# Patient Record
Sex: MALE | Race: WHITE | Employment: OTHER | URBAN - METROPOLITAN AREA
[De-identification: names, ages, dates, MRNs, and addresses within clinical notes are randomized per-mention and may not be internally consistent; named-entity substitution may affect disease eponyms.]

---

## 2019-04-05 ENCOUNTER — APPOINTMENT (OUTPATIENT)
Dept: CT IMAGING | Age: 69
DRG: 247 | End: 2019-04-05
Payer: MEDICARE

## 2019-04-05 ENCOUNTER — APPOINTMENT (OUTPATIENT)
Dept: GENERAL RADIOLOGY | Age: 69
DRG: 247 | End: 2019-04-05
Payer: MEDICARE

## 2019-04-05 ENCOUNTER — HOSPITAL ENCOUNTER (INPATIENT)
Age: 69
LOS: 1 days | Discharge: HOME | DRG: 247 | End: 2019-04-09
Attending: EMERGENCY MEDICINE | Admitting: INTERNAL MEDICINE
Payer: MEDICARE

## 2019-04-05 DIAGNOSIS — R07.9 CHEST PAIN, UNSPECIFIED TYPE: Primary | ICD-10-CM

## 2019-04-05 PROBLEM — R74.8 ELEVATED LIPASE: Status: ACTIVE | Noted: 2019-04-05

## 2019-04-05 PROBLEM — E66.9 OBESITY (BMI 30-39.9): Status: ACTIVE | Noted: 2019-04-05

## 2019-04-05 PROBLEM — R51.9 HEADACHE: Status: ACTIVE | Noted: 2019-04-05

## 2019-04-05 PROBLEM — I71.40 AAA (ABDOMINAL AORTIC ANEURYSM): Status: ACTIVE | Noted: 2019-04-05

## 2019-04-05 PROBLEM — I10 ACCELERATED HYPERTENSION: Status: ACTIVE | Noted: 2019-04-05

## 2019-04-05 PROBLEM — I45.10 INCOMPLETE RIGHT BUNDLE BRANCH BLOCK: Status: ACTIVE | Noted: 2019-04-05

## 2019-04-05 PROBLEM — E11.65 UNCONTROLLED TYPE 2 DIABETES MELLITUS WITH HYPERGLYCEMIA (HCC): Status: ACTIVE | Noted: 2019-04-05

## 2019-04-05 LAB
ALBUMIN SERPL-MCNC: 4.3 G/DL (ref 3.5–5.2)
ALP BLD-CCNC: 67 U/L (ref 40–129)
ALT SERPL-CCNC: 24 U/L (ref 0–40)
AMYLASE: 69 U/L (ref 20–100)
ANION GAP SERPL CALCULATED.3IONS-SCNC: 12 MMOL/L (ref 7–16)
AST SERPL-CCNC: 42 U/L (ref 0–39)
BASOPHILS ABSOLUTE: 0.02 E9/L (ref 0–0.2)
BASOPHILS RELATIVE PERCENT: 0.3 % (ref 0–2)
BILIRUB SERPL-MCNC: 0.3 MG/DL (ref 0–1.2)
BUN BLDV-MCNC: 18 MG/DL (ref 8–23)
CALCIUM SERPL-MCNC: 9.4 MG/DL (ref 8.6–10.2)
CHLORIDE BLD-SCNC: 100 MMOL/L (ref 98–107)
CO2: 26 MMOL/L (ref 22–29)
CREAT SERPL-MCNC: 1.1 MG/DL (ref 0.7–1.2)
EOSINOPHILS ABSOLUTE: 0.1 E9/L (ref 0.05–0.5)
EOSINOPHILS RELATIVE PERCENT: 1.4 % (ref 0–6)
GFR AFRICAN AMERICAN: >60
GFR AFRICAN AMERICAN: >60
GFR NON-AFRICAN AMERICAN: >60 ML/MIN/1.73
GFR NON-AFRICAN AMERICAN: >60 ML/MIN/1.73
GLUCOSE BLD-MCNC: 252 MG/DL (ref 74–99)
GLUCOSE BLD-MCNC: 261 MG/DL (ref 74–99)
HBA1C MFR BLD: 7.4 % (ref 4–5.6)
HCT VFR BLD CALC: 46.6 % (ref 37–54)
HEMOGLOBIN: 15.6 G/DL (ref 12.5–16.5)
IMMATURE GRANULOCYTES #: 0.02 E9/L
IMMATURE GRANULOCYTES %: 0.3 % (ref 0–5)
INR BLD: 1.1
LIPASE: 112 U/L (ref 13–60)
LYMPHOCYTES ABSOLUTE: 1.74 E9/L (ref 1.5–4)
LYMPHOCYTES RELATIVE PERCENT: 25 % (ref 20–42)
MCH RBC QN AUTO: 28.1 PG (ref 26–35)
MCHC RBC AUTO-ENTMCNC: 33.5 % (ref 32–34.5)
MCV RBC AUTO: 83.8 FL (ref 80–99.9)
METER GLUCOSE: 127 MG/DL (ref 74–99)
MONOCYTES ABSOLUTE: 0.51 E9/L (ref 0.1–0.95)
MONOCYTES RELATIVE PERCENT: 7.3 % (ref 2–12)
NEUTROPHILS ABSOLUTE: 4.58 E9/L (ref 1.8–7.3)
NEUTROPHILS RELATIVE PERCENT: 65.7 % (ref 43–80)
PDW BLD-RTO: 13.2 FL (ref 11.5–15)
PERFORMED ON: ABNORMAL
PLATELET # BLD: 298 E9/L (ref 130–450)
PMV BLD AUTO: 10.2 FL (ref 7–12)
POC CHLORIDE: 108 MMOL/L (ref 100–108)
POC CREATININE: 1.1 MG/DL (ref 0.7–1.2)
POC POTASSIUM: 3.5 MMOL/L (ref 3.5–5)
POC SODIUM: 140 MMOL/L (ref 132–146)
POTASSIUM REFLEX MAGNESIUM: 4.9 MMOL/L (ref 3.5–5)
PROTHROMBIN TIME: 12.2 SEC (ref 9.3–12.4)
RBC # BLD: 5.56 E12/L (ref 3.8–5.8)
SODIUM BLD-SCNC: 138 MMOL/L (ref 132–146)
TOTAL PROTEIN: 8.1 G/DL (ref 6.4–8.3)
TROPONIN: <0.01 NG/ML (ref 0–0.03)
TROPONIN: <0.01 NG/ML (ref 0–0.03)
TSH SERPL DL<=0.05 MIU/L-ACNC: 6.67 UIU/ML (ref 0.27–4.2)
WBC # BLD: 7 E9/L (ref 4.5–11.5)

## 2019-04-05 PROCEDURE — 36415 COLL VENOUS BLD VENIPUNCTURE: CPT

## 2019-04-05 PROCEDURE — 80053 COMPREHEN METABOLIC PANEL: CPT

## 2019-04-05 PROCEDURE — 84132 ASSAY OF SERUM POTASSIUM: CPT

## 2019-04-05 PROCEDURE — 84443 ASSAY THYROID STIM HORMONE: CPT

## 2019-04-05 PROCEDURE — 6370000000 HC RX 637 (ALT 250 FOR IP): Performed by: EMERGENCY MEDICINE

## 2019-04-05 PROCEDURE — 82962 GLUCOSE BLOOD TEST: CPT

## 2019-04-05 PROCEDURE — G0378 HOSPITAL OBSERVATION PER HR: HCPCS

## 2019-04-05 PROCEDURE — 82947 ASSAY GLUCOSE BLOOD QUANT: CPT

## 2019-04-05 PROCEDURE — 71045 X-RAY EXAM CHEST 1 VIEW: CPT

## 2019-04-05 PROCEDURE — 83690 ASSAY OF LIPASE: CPT

## 2019-04-05 PROCEDURE — 84484 ASSAY OF TROPONIN QUANT: CPT

## 2019-04-05 PROCEDURE — 71275 CT ANGIOGRAPHY CHEST: CPT

## 2019-04-05 PROCEDURE — 84295 ASSAY OF SERUM SODIUM: CPT

## 2019-04-05 PROCEDURE — 85610 PROTHROMBIN TIME: CPT

## 2019-04-05 PROCEDURE — 82435 ASSAY OF BLOOD CHLORIDE: CPT

## 2019-04-05 PROCEDURE — 6360000004 HC RX CONTRAST MEDICATION: Performed by: RADIOLOGY

## 2019-04-05 PROCEDURE — 2580000003 HC RX 258: Performed by: RADIOLOGY

## 2019-04-05 PROCEDURE — 82565 ASSAY OF CREATININE: CPT

## 2019-04-05 PROCEDURE — 99285 EMERGENCY DEPT VISIT HI MDM: CPT

## 2019-04-05 PROCEDURE — 83036 HEMOGLOBIN GLYCOSYLATED A1C: CPT

## 2019-04-05 PROCEDURE — 82150 ASSAY OF AMYLASE: CPT

## 2019-04-05 PROCEDURE — 85025 COMPLETE CBC W/AUTO DIFF WBC: CPT

## 2019-04-05 RX ORDER — KETOTIFEN FUMARATE 0.35 MG/ML
1 SOLUTION/ DROPS OPHTHALMIC 2 TIMES DAILY
Status: DISCONTINUED | OUTPATIENT
Start: 2019-04-05 | End: 2019-04-09 | Stop reason: HOSPADM

## 2019-04-05 RX ORDER — LEVOTHYROXINE SODIUM 0.03 MG/1
25 TABLET ORAL DAILY
COMMUNITY

## 2019-04-05 RX ORDER — ASPIRIN 81 MG/1
81 TABLET, CHEWABLE ORAL DAILY
Status: DISCONTINUED | OUTPATIENT
Start: 2019-04-06 | End: 2019-04-08

## 2019-04-05 RX ORDER — ASPIRIN 325 MG
325 TABLET ORAL ONCE
Status: COMPLETED | OUTPATIENT
Start: 2019-04-05 | End: 2019-04-05

## 2019-04-05 RX ORDER — SODIUM CHLORIDE 0.9 % (FLUSH) 0.9 %
10 SYRINGE (ML) INJECTION PRN
Status: DISCONTINUED | OUTPATIENT
Start: 2019-04-05 | End: 2019-04-09 | Stop reason: HOSPADM

## 2019-04-05 RX ORDER — NICOTINE POLACRILEX 4 MG
15 LOZENGE BUCCAL PRN
Status: DISCONTINUED | OUTPATIENT
Start: 2019-04-05 | End: 2019-04-09 | Stop reason: HOSPADM

## 2019-04-05 RX ORDER — ATORVASTATIN CALCIUM 40 MG/1
40 TABLET, FILM COATED ORAL NIGHTLY
Status: DISCONTINUED | OUTPATIENT
Start: 2019-04-05 | End: 2019-04-06

## 2019-04-05 RX ORDER — HYDROCHLOROTHIAZIDE 12.5 MG/1
12.5 TABLET ORAL DAILY
Status: DISCONTINUED | OUTPATIENT
Start: 2019-04-05 | End: 2019-04-07

## 2019-04-05 RX ORDER — GLIPIZIDE 5 MG/1
10 TABLET ORAL DAILY
Status: DISCONTINUED | OUTPATIENT
Start: 2019-04-06 | End: 2019-04-09 | Stop reason: HOSPADM

## 2019-04-05 RX ORDER — LISINOPRIL 20 MG/1
20 TABLET ORAL DAILY
Status: DISCONTINUED | OUTPATIENT
Start: 2019-04-06 | End: 2019-04-09

## 2019-04-05 RX ORDER — LEVOTHYROXINE SODIUM 0.03 MG/1
25 TABLET ORAL DAILY
Status: DISCONTINUED | OUTPATIENT
Start: 2019-04-06 | End: 2019-04-09 | Stop reason: HOSPADM

## 2019-04-05 RX ORDER — SODIUM CHLORIDE 0.9 % (FLUSH) 0.9 %
10 SYRINGE (ML) INJECTION EVERY 12 HOURS SCHEDULED
Status: DISCONTINUED | OUTPATIENT
Start: 2019-04-05 | End: 2019-04-09 | Stop reason: HOSPADM

## 2019-04-05 RX ORDER — EZETIMIBE 10 MG/1
10 TABLET ORAL DAILY
Status: ON HOLD | COMMUNITY
End: 2021-09-28

## 2019-04-05 RX ORDER — DEXTROSE MONOHYDRATE 50 MG/ML
100 INJECTION, SOLUTION INTRAVENOUS PRN
Status: DISCONTINUED | OUTPATIENT
Start: 2019-04-05 | End: 2019-04-09 | Stop reason: HOSPADM

## 2019-04-05 RX ORDER — SODIUM CHLORIDE 0.9 % (FLUSH) 0.9 %
10 SYRINGE (ML) INJECTION ONCE
Status: COMPLETED | OUTPATIENT
Start: 2019-04-05 | End: 2019-04-05

## 2019-04-05 RX ORDER — NITROGLYCERIN 0.4 MG/1
0.4 TABLET SUBLINGUAL EVERY 5 MIN PRN
Status: DISCONTINUED | OUTPATIENT
Start: 2019-04-05 | End: 2019-04-09 | Stop reason: HOSPADM

## 2019-04-05 RX ORDER — KETOTIFEN FUMARATE 0.35 MG/ML
1 SOLUTION/ DROPS OPHTHALMIC 2 TIMES DAILY
Status: ON HOLD | COMMUNITY
End: 2021-09-28

## 2019-04-05 RX ORDER — ONDANSETRON 2 MG/ML
4 INJECTION INTRAMUSCULAR; INTRAVENOUS EVERY 6 HOURS PRN
Status: DISCONTINUED | OUTPATIENT
Start: 2019-04-05 | End: 2019-04-09 | Stop reason: HOSPADM

## 2019-04-05 RX ORDER — DEXTROSE MONOHYDRATE 25 G/50ML
12.5 INJECTION, SOLUTION INTRAVENOUS PRN
Status: DISCONTINUED | OUTPATIENT
Start: 2019-04-05 | End: 2019-04-09 | Stop reason: HOSPADM

## 2019-04-05 RX ORDER — ASPIRIN 81 MG/1
162 TABLET, CHEWABLE ORAL ONCE
Status: DISCONTINUED | OUTPATIENT
Start: 2019-04-05 | End: 2019-04-05

## 2019-04-05 RX ORDER — TAMSULOSIN HYDROCHLORIDE 0.4 MG/1
0.8 CAPSULE ORAL DAILY
Status: ON HOLD | COMMUNITY
End: 2021-12-02 | Stop reason: HOSPADM

## 2019-04-05 RX ORDER — TAMSULOSIN HYDROCHLORIDE 0.4 MG/1
0.8 CAPSULE ORAL DAILY
Status: DISCONTINUED | OUTPATIENT
Start: 2019-04-06 | End: 2019-04-09 | Stop reason: HOSPADM

## 2019-04-05 RX ADMIN — NITROGLYCERIN 0.4 MG: 0.4 TABLET, ORALLY DISINTEGRATING SUBLINGUAL at 17:07

## 2019-04-05 RX ADMIN — IOPAMIDOL 90 ML: 755 INJECTION, SOLUTION INTRAVENOUS at 16:55

## 2019-04-05 RX ADMIN — Medication 10 ML: at 16:55

## 2019-04-05 RX ADMIN — ASPIRIN 325 MG: 325 TABLET, COATED ORAL at 17:23

## 2019-04-05 ASSESSMENT — PAIN DESCRIPTION - ORIENTATION: ORIENTATION: RIGHT

## 2019-04-05 ASSESSMENT — PAIN SCALES - GENERAL
PAINLEVEL_OUTOF10: 0
PAINLEVEL_OUTOF10: 6
PAINLEVEL_OUTOF10: 0

## 2019-04-05 ASSESSMENT — ENCOUNTER SYMPTOMS
COUGH: 0
SINUS PRESSURE: 0
EYE REDNESS: 0
SHORTNESS OF BREATH: 1
NAUSEA: 0
EYE DISCHARGE: 0
DIARRHEA: 0
ABDOMINAL PAIN: 0
EYE PAIN: 0
SORE THROAT: 0
WHEEZING: 0
VOMITING: 0
BACK PAIN: 0

## 2019-04-05 ASSESSMENT — PAIN DESCRIPTION - PROGRESSION: CLINICAL_PROGRESSION: RESOLVED

## 2019-04-05 ASSESSMENT — PAIN DESCRIPTION - DESCRIPTORS: DESCRIPTORS: ACHING

## 2019-04-05 ASSESSMENT — PAIN DESCRIPTION - FREQUENCY: FREQUENCY: CONTINUOUS

## 2019-04-05 ASSESSMENT — PAIN DESCRIPTION - LOCATION: LOCATION: CHEST

## 2019-04-05 ASSESSMENT — PAIN DESCRIPTION - PAIN TYPE: TYPE: ACUTE PAIN

## 2019-04-05 NOTE — ED NOTES
Yolanda Willson RN confirmed retrieval of SBAR, monitor applied, CMR called to verify placement, placed in transport.       Héctor Lucas RN  04/05/19 [id number]

## 2019-04-05 NOTE — ED PROVIDER NOTES
28-year-old male presenting to the emergency department by EMS for primary complaint of chest pain. Patient states pain began approximately 30 minutes prior to arrival. Pain is described as a tight in nature, with radiation into his right arm. This pain was sudden in onset and occurred while he was standing in line at the bank, he states pain is worse with ambulation, and better with rest. He denies any recent trauma. He also notes associated shortness of breath, as well as mild diaphoresis. He takes an 81 mg aspirin every night. He denies any cardiac history, but states that recently he has been experiencing some left upper chest discomfort. He received a cardiac stress test and echocardiogram at the [facility] clinic recently which did not reveal any acute abnormalities. The history is provided by the patient. Review of Systems   Constitutional: Positive for diaphoresis. Negative for chills and fever. HENT: Negative for ear pain, sinus pressure and sore throat. Eyes: Negative for pain, discharge and redness. Respiratory: Positive for shortness of breath. Negative for cough and wheezing. Cardiovascular: Positive for chest pain. Gastrointestinal: Negative for abdominal pain, diarrhea, nausea and vomiting. Genitourinary: Negative for dysuria and frequency. Musculoskeletal: Negative for arthralgias and back pain. Skin: Negative for rash and wound. Neurological: Negative for weakness and headaches. Hematological: Negative for adenopathy. All other systems reviewed and are negative. Physical Exam   Constitutional: He is oriented to person, place, and time. He appears well-developed and well-nourished. Patient is laying supine, appears anxious   HENT:   Head: Normocephalic and atraumatic. Right Ear: External ear normal.   Left Ear: External ear normal.   Mouth/Throat: Oropharynx is clear and moist.   Eyes: Pupils are equal, round, and reactive to light. Neck: Normal range of motion. Neck supple. Cardiovascular: Normal rate, regular rhythm and normal heart sounds. No murmur heard. Pulmonary/Chest: Effort normal and breath sounds normal. No respiratory distress. He has no wheezes. He has no rales. Abdominal: Soft. Bowel sounds are normal. There is no tenderness. There is no rebound and no guarding. Musculoskeletal: He exhibits no edema. Neurological: He is alert and oriented to person, place, and time. No cranial nerve deficit. Coordination normal.   Skin: Skin is warm and dry. Mildly diaphoretic   Nursing note and vitals reviewed. Procedures    MDM  Number of Diagnoses or Management Options  Chest pain, unspecified type:   Diagnosis management comments: 61-year-old male presenting with sudden onset chest pain, worse with exertion, with associated shortness of breath, chest tightness with radiation to the right arm. EKG shows no acute ST elevations or depressions. Vital signs concerning for hypertension. Patient given sublingual nitroglycerin, chewable aspirin. Cardiac workup negative at this time for any acute abnormalities. Chest pain resolved significantly with subungual nitroglycerin. Serial evaluations notable for patient chest pain improvement. He was admitted for further evaluation and cardiac workup. ED Course as of Apr 05 1821 Fri Apr 05, 2019   1730 Reassessed patient at bedside, he states his chest pain is improved significantly shortly after receiving sublingual a glycerin    [KS]      ED Course User Index  [KS] Dinora Weaver DO       EKG: This EKG is signed and interpreted by me.     Rate: 86  Rhythm: Sinus  Interpretation: No acute ST elevations or depressions, adequate R wave progression left axis deviation, incomplete right bundle branch block  Comparison: No previous EKG available for comparison    --------------------------------------------- PAST HISTORY ---------------------------------------------  Past Medical History:  has a past medical history of Diabetes mellitus ([facility] [address] Hypercholesteremia, Hypertension, and Squamous cell carcinoma. Past Surgical History:  has a past surgical history that includes shoulder surgery; Carpal tunnel release; Tonsillectomy; and Hemorrhoid surgery. Social History:  reports that he has quit smoking. He quit smokeless tobacco use about 30 years ago. He reports that he does not drink alcohol or use drugs. Family History: family history is not on file. The patients home medications have been reviewed. Allergies: Patient has no known allergies.     -------------------------------------------------- RESULTS -------------------------------------------------    LABS:  Results for orders placed or performed during the hospital encounter of 04/05/19   CBC Auto Differential   Result Value Ref Range    WBC 7.0 4.5 - 11.5 E9/L    RBC 5.56 3.80 - 5.80 E12/L    Hemoglobin 15.6 12.5 - 16.5 g/dL    Hematocrit 46.6 37.0 - 54.0 %    MCV 83.8 80.0 - 99.9 fL    MCH 28.1 26.0 - 35.0 pg    MCHC 33.5 32.0 - 34.5 %    RDW 13.2 11.5 - 15.0 fL    Platelets 214 834 - 396 E9/L    MPV 10.2 7.0 - 12.0 fL    Neutrophils % 65.7 43.0 - 80.0 %    Immature Granulocytes % 0.3 0.0 - 5.0 %    Lymphocytes % 25.0 20.0 - 42.0 %    Monocytes % 7.3 2.0 - 12.0 %    Eosinophils % 1.4 0.0 - 6.0 %    Basophils % 0.3 0.0 - 2.0 %    Neutrophils # 4.58 1.80 - 7.30 E9/L    Immature Granulocytes # 0.02 E9/L    Lymphocytes # 1.74 1.50 - 4.00 E9/L    Monocytes # 0.51 0.10 - 0.95 E9/L    Eosinophils # 0.10 0.05 - 0.50 E9/L    Basophils # 0.02 0.00 - 0.20 E9/L   Comprehensive Metabolic Panel w/ Reflex to MG   Result Value Ref Range    Sodium 138 132 - 146 mmol/L    Potassium reflex Magnesium 4.9 3.5 - 5.0 mmol/L    Chloride 100 98 - 107 mmol/L    CO2 26 22 - 29 mmol/L    Anion Gap 12 7 - 16 mmol/L    Glucose 252 (H) 74 - 99 mg/dL    BUN 18 8 - 23 mg/dL    CREATININE 1.1 0.7 - 1.2 mg/dL    GFR Non-African American >60 >=60 mL/min/1.73    GFR African American >60 Calcium 9.4 8.6 - 10.2 mg/dL    Total Protein 8.1 6.4 - 8.3 g/dL    Alb 4.3 3.5 - 5.2 g/dL    Total Bilirubin 0.3 0.0 - 1.2 mg/dL    Alkaline Phosphatase 67 40 - 129 U/L    ALT 24 0 - 40 U/L    AST 42 (H) 0 - 39 U/L   Troponin   Result Value Ref Range    Troponin <0.01 0.00 - 0.03 ng/mL   Lipase   Result Value Ref Range    Lipase 112 (H) 13 - 60 U/L   Protime-INR   Result Value Ref Range    Protime 12.2 9.3 - 12.4 sec    INR 1.1    POCT Venous   Result Value Ref Range    POC Sodium 140 132 - 146 mmol/L    POC Potassium 3.5 3.5 - 5.0 mmol/L    POC Chloride 108 100 - 108 mmol/L    POC Glucose 261 (H) 74 - 99 mg/dl    POC Creatinine 1.1 0.7 - 1.2 mg/dL    GFR Non-African American >60 >=60 mL/min/1.73    GFR  >60     Performed on SEE BELOW        RADIOLOGY:  Xr Chest Portable    Result Date: 2019  Patient MRN: [id number] : 1950 Age:  81 years Gender: Male Order Date: 2019 4:45 PM. Study made available for interpretation at approximately 1650 hours Exam: XR CHEST PORTABLE Number of Views: 1 Indication:   Chest pain and shortness of breath Comparison: None Findings: There is a normal cardiomediastinal silhouette with thoracic aortic vascular calcifications with bibasilar airspace disease, nonspecific finding. No pneumothorax. 1. Vascular calcifications thoracic aorta. 2. Nonspecific bibasilar airspace disease, findings can be seen infiltrate/pneumonia and/or atelectasis. Cta Chest W Contrast    Result Date: 2019  Patient MRN:  [id number] : 1950 Age: 81 years Gender: Male Order Date:  2019 4:45 PM EXAM: CTA CHEST W CONTRAST number of images 938 including MIP coronal and sagittal reconstruction images, and 3-D reconstruction images of the aorta. Contrast. Isovue-370, 90 mL intravenously Technique: Low-dose CT  acquisition technique included one of following options; 1 . Automated exposure control, 2. Adjustment of MA and or KV according to patient's size or 3.  Use of iterative reconstruction. INDICATION:  R/O dissecton  COMPARISON: None FINDINGS: The heart and the great vessels are unremarkable. The trachea and major bronchi are patent. There is coronary artery calcifications. There are no filling defects in the main pulmonary artery and the central branches to suggest  pulmonary embolism. Lungs are free of acute infiltrate or pleural effusion. The liver is of normal architecture. Bilateral renal cysts are identified with the largest one in the left kidney measuring 3.4 cm. Nonobstructing left kidney stones are also noted. . Degenerative changes are identified in the thoracic spine. CTA. The thoracic aorta appears normal with ascending thoracic aorta measuring 3 x 3.2 cm and the descending thoracic aorta measuring 2.2 cm without dissection or aneurysm. The origin of the great vessels appear normal.     There is no thoracic aortic aneurysm, aortic dissection or central pulmonary embolism. There is coronary artery calcifications.           ------------------------- NURSING NOTES AND VITALS REVIEWED ---------------------------  Date / Time Roomed:  4/5/2019  4:33 PM  ED Bed Assignment:  23/23    The nursing notes within the ED encounter and vital signs as below have been reviewed.      Patient Vitals for the past 24 hrs:   BP Temp Pulse Resp SpO2 Height Weight   04/05/19 1714 (!) 172/84 -- 77 19 97 % -- --   04/05/19 1635 (!) 240/90 98.3 °F (36.8 °C) 92 16 97 % 5' 9\" (1.753 m) 205 lb (93 kg)   04/05/19 1628 -- -- 88 -- 97 % -- --       Oxygen Saturation Interpretation: Normal    ------------------------------------------ PROGRESS NOTES ------------------------------------------  ED Course as of Apr 05 1821 Fri Apr 05, 2019   1730 Reassessed patient at bedside, he states his chest pain is improved significantly shortly after receiving sublingual a glycerin    [KS]      ED Course User Index  [KS] Dante Clarity, DO           Counseling:  I have spoken with the patient and discussed todays results, in addition to providing specific details for the plan of care and counseling regarding the diagnosis and prognosis. Their questions are answered at this time and they are agreeable with the plan of admission.    --------------------------------- ADDITIONAL PROVIDER NOTES ---------------------------------  Consultations:  Time: 1818. Spoke with Dr. Jorge Mena. Discussed case. They will admit the patient. This patient's ED course included: a personal history and physicial examination    This patient has remained hemodynamically stable during their ED course. Diagnosis:  1. Chest pain, unspecified type        Disposition:  Patient's disposition: Admit to telemetry  Patient's condition is stable.          Nikki Spring DO  Resident  04/05/19 5816

## 2019-04-05 NOTE — ED NOTES
ED SBAR faxed, called 6th floor to request telemetry monitor.       Anthony Medeiros RN  04/05/19 0926

## 2019-04-05 NOTE — ED NOTES
Name: Ingrid Flower  : 1950  MRN: [id number]    Date: 2019    Benefits of immediately proceeding with Radiology exam outweigh the risks and therefore the following is being waived:      [] Pregnancy test    [] Protocol for Iodine allergy    [] MRI questionnaire    [x] BUN/Creatinine        MD Maged Galvez MD  19 [id number]

## 2019-04-06 ENCOUNTER — APPOINTMENT (OUTPATIENT)
Dept: ULTRASOUND IMAGING | Age: 69
DRG: 247 | End: 2019-04-06
Payer: MEDICARE

## 2019-04-06 PROBLEM — I71.40 AAA (ABDOMINAL AORTIC ANEURYSM) (HCC): Status: RESOLVED | Noted: 2019-04-05 | Resolved: 2019-04-06

## 2019-04-06 PROBLEM — I25.9 CHEST PAIN DUE TO MYOCARDIAL ISCHEMIA: Status: ACTIVE | Noted: 2019-04-06

## 2019-04-06 PROBLEM — E66.811 CLASS 1 OBESITY DUE TO EXCESS CALORIES IN ADULT: Status: ACTIVE | Noted: 2019-04-06

## 2019-04-06 PROBLEM — E66.09 CLASS 1 OBESITY DUE TO EXCESS CALORIES IN ADULT: Status: ACTIVE | Noted: 2019-04-06

## 2019-04-06 LAB
ANION GAP SERPL CALCULATED.3IONS-SCNC: 13 MMOL/L (ref 7–16)
BUN BLDV-MCNC: 15 MG/DL (ref 8–23)
CALCIUM SERPL-MCNC: 8.8 MG/DL (ref 8.6–10.2)
CHLORIDE BLD-SCNC: 107 MMOL/L (ref 98–107)
CHOLESTEROL, TOTAL: 177 MG/DL (ref 0–199)
CO2: 23 MMOL/L (ref 22–29)
CREAT SERPL-MCNC: 1.1 MG/DL (ref 0.7–1.2)
GFR AFRICAN AMERICAN: >60
GFR NON-AFRICAN AMERICAN: >60 ML/MIN/1.73
GLUCOSE BLD-MCNC: 126 MG/DL (ref 74–99)
HCT VFR BLD CALC: 41.8 % (ref 37–54)
HDLC SERPL-MCNC: 35 MG/DL
HEMOGLOBIN: 14 G/DL (ref 12.5–16.5)
LDL CHOLESTEROL CALCULATED: 99 MG/DL (ref 0–99)
LIPASE: 566 U/L (ref 13–60)
MCH RBC QN AUTO: 28.5 PG (ref 26–35)
MCHC RBC AUTO-ENTMCNC: 33.5 % (ref 32–34.5)
MCV RBC AUTO: 85 FL (ref 80–99.9)
METER GLUCOSE: 160 MG/DL (ref 74–99)
METER GLUCOSE: 162 MG/DL (ref 74–99)
METER GLUCOSE: 215 MG/DL (ref 74–99)
PDW BLD-RTO: 13.2 FL (ref 11.5–15)
PLATELET # BLD: 301 E9/L (ref 130–450)
PMV BLD AUTO: 10.1 FL (ref 7–12)
POTASSIUM REFLEX MAGNESIUM: 3.9 MMOL/L (ref 3.5–5)
RBC # BLD: 4.92 E12/L (ref 3.8–5.8)
SODIUM BLD-SCNC: 143 MMOL/L (ref 132–146)
TRIGL SERPL-MCNC: 213 MG/DL (ref 0–149)
TROPONIN: <0.01 NG/ML (ref 0–0.03)
VLDLC SERPL CALC-MCNC: 43 MG/DL
WBC # BLD: 5.7 E9/L (ref 4.5–11.5)

## 2019-04-06 PROCEDURE — 36415 COLL VENOUS BLD VENIPUNCTURE: CPT

## 2019-04-06 PROCEDURE — 83690 ASSAY OF LIPASE: CPT

## 2019-04-06 PROCEDURE — 6370000000 HC RX 637 (ALT 250 FOR IP): Performed by: INTERNAL MEDICINE

## 2019-04-06 PROCEDURE — 76705 ECHO EXAM OF ABDOMEN: CPT

## 2019-04-06 PROCEDURE — 6370000000 HC RX 637 (ALT 250 FOR IP): Performed by: NURSE PRACTITIONER

## 2019-04-06 PROCEDURE — G0378 HOSPITAL OBSERVATION PER HR: HCPCS

## 2019-04-06 PROCEDURE — 2580000003 HC RX 258: Performed by: INTERNAL MEDICINE

## 2019-04-06 PROCEDURE — 96372 THER/PROPH/DIAG INJ SC/IM: CPT

## 2019-04-06 PROCEDURE — 6370000000 HC RX 637 (ALT 250 FOR IP): Performed by: FAMILY MEDICINE

## 2019-04-06 PROCEDURE — 2580000003 HC RX 258: Performed by: FAMILY MEDICINE

## 2019-04-06 PROCEDURE — 93005 ELECTROCARDIOGRAM TRACING: CPT | Performed by: INTERNAL MEDICINE

## 2019-04-06 PROCEDURE — 82962 GLUCOSE BLOOD TEST: CPT

## 2019-04-06 PROCEDURE — 99223 1ST HOSP IP/OBS HIGH 75: CPT | Performed by: INTERNAL MEDICINE

## 2019-04-06 PROCEDURE — 84484 ASSAY OF TROPONIN QUANT: CPT

## 2019-04-06 PROCEDURE — 80061 LIPID PANEL: CPT

## 2019-04-06 PROCEDURE — 6360000002 HC RX W HCPCS: Performed by: INTERNAL MEDICINE

## 2019-04-06 PROCEDURE — APPSS60 APP SPLIT SHARED TIME 46-60 MINUTES: Performed by: NURSE PRACTITIONER

## 2019-04-06 PROCEDURE — 2140000000 HC CCU INTERMEDIATE R&B

## 2019-04-06 PROCEDURE — 80048 BASIC METABOLIC PNL TOTAL CA: CPT

## 2019-04-06 PROCEDURE — 85027 COMPLETE CBC AUTOMATED: CPT

## 2019-04-06 RX ORDER — SODIUM CHLORIDE 9 MG/ML
INJECTION, SOLUTION INTRAVENOUS CONTINUOUS
Status: DISCONTINUED | OUTPATIENT
Start: 2019-04-06 | End: 2019-04-06

## 2019-04-06 RX ORDER — AMLODIPINE BESYLATE 5 MG/1
5 TABLET ORAL DAILY
Status: DISCONTINUED | OUTPATIENT
Start: 2019-04-06 | End: 2019-04-07

## 2019-04-06 RX ADMIN — INSULIN LISPRO 2 UNITS: 100 INJECTION, SOLUTION INTRAVENOUS; SUBCUTANEOUS at 12:33

## 2019-04-06 RX ADMIN — ASPIRIN 81 MG 81 MG: 81 TABLET ORAL at 08:48

## 2019-04-06 RX ADMIN — SODIUM CHLORIDE 75 ML: 9 INJECTION, SOLUTION INTRAVENOUS at 01:30

## 2019-04-06 RX ADMIN — ENOXAPARIN SODIUM 40 MG: 40 INJECTION SUBCUTANEOUS at 08:48

## 2019-04-06 RX ADMIN — INSULIN LISPRO 2 UNITS: 100 INJECTION, SOLUTION INTRAVENOUS; SUBCUTANEOUS at 21:08

## 2019-04-06 RX ADMIN — Medication 10 ML: at 20:41

## 2019-04-06 RX ADMIN — AMLODIPINE BESYLATE 5 MG: 5 TABLET ORAL at 12:35

## 2019-04-06 RX ADMIN — Medication 10 ML: at 05:09

## 2019-04-06 RX ADMIN — KETOTIFEN FUMARATE 1 DROP: 0.35 SOLUTION/ DROPS OPHTHALMIC at 05:08

## 2019-04-06 RX ADMIN — TAMSULOSIN HYDROCHLORIDE 0.8 MG: 0.4 CAPSULE ORAL at 08:50

## 2019-04-06 RX ADMIN — INSULIN LISPRO 4 UNITS: 100 INJECTION, SOLUTION INTRAVENOUS; SUBCUTANEOUS at 17:05

## 2019-04-06 RX ADMIN — LEVOTHYROXINE SODIUM 25 MCG: 25 TABLET ORAL at 06:57

## 2019-04-06 RX ADMIN — KETOTIFEN FUMARATE 1 DROP: 0.35 SOLUTION/ DROPS OPHTHALMIC at 20:41

## 2019-04-06 RX ADMIN — HYDROCHLOROTHIAZIDE 12.5 MG: 12.5 TABLET ORAL at 08:49

## 2019-04-06 RX ADMIN — GLIPIZIDE 10 MG: 5 TABLET ORAL at 12:32

## 2019-04-06 RX ADMIN — LISINOPRIL 20 MG: 20 TABLET ORAL at 08:50

## 2019-04-06 ASSESSMENT — PAIN SCALES - GENERAL
PAINLEVEL_OUTOF10: 0

## 2019-04-06 NOTE — PROGRESS NOTES
Hospitalist Progress Note      Synopsis: Patient admitted on 4/5/2019     Subjective  No chest pain  No abdominal pain  No n/v/diarrhea/constipation  Cardio eval / plan noted    Exam:  BP (!) 151/75   Pulse 72   Temp 98.3 °F (36.8 °C) (Temporal)   Resp 16   Ht 5' 9\" (1.753 m)   Wt 203 lb 11.2 oz (92.4 kg)   SpO2 97%   BMI 30.08 kg/m²   General appearance: No apparent distress, appears stated age and cooperative. HEENT: Pupils equal, round, and reactive to light. Conjunctivae/corneas clear. Neck: Supple. No jugular venous distention. Trachea midline. Respiratory:  Normal respiratory effort. Clear to auscultation, bilaterally without Rales/Wheezes/Rhonchi. Cardiovascular: Regular rate and rhythm with normal S1/S2 without murmurs, rubs or gallops. Abdomen: Soft, non-tender, non-distended with normal bowel sounds. Musculoskeletal: No clubbing, cyanosis or edema bilaterally. Brisk capillary refill. 2+ lower extremity pulses (dorsalis pedis).    Skin:  No rashes    Neurologic: awake, alert and following commands     Medications:  Reviewed    Infusion Medications    dextrose       Scheduled Medications    amLODIPine  5 mg Oral Daily    sodium chloride flush  10 mL Intravenous 2 times per day    aspirin  81 mg Oral Daily    enoxaparin  40 mg Subcutaneous Daily    levothyroxine  25 mcg Oral Daily    glipiZIDE  10 mg Oral Daily    ketotifen  1 drop Both Eyes BID    lisinopril  20 mg Oral Daily    hydrochlorothiazide  12.5 mg Oral Daily    tamsulosin  0.8 mg Oral Daily    insulin lispro  0-10 Units Subcutaneous 4x Daily AC & HS     PRN Meds: nitroGLYCERIN, sodium chloride flush, magnesium hydroxide, ondansetron, glucose, dextrose, glucagon (rDNA), dextrose    I/O    Intake/Output Summary (Last 24 hours) at 4/6/2019 1434  Last data filed at 4/6/2019 1405  Gross per 24 hour   Intake 1084 ml   Output 975 ml   Net 109 ml       Labs:   Recent Labs     04/05/19  1700 04/06/19  0508   WBC 7.0 5.7   HGB 15.6 14.0   HCT 46.6 41.8    301       Recent Labs     04/05/19  1653 04/05/19  1700 04/06/19  0508   NA  --  138 143   K  --  4.9 3.9   CL  --  100 107   CO2  --  26 23   BUN  --  18 15   CREATININE 1.1 1.1 1.1   CALCIUM  --  9.4 8.8       Recent Labs     04/05/19  1700 04/05/19  2257 04/06/19  0508   PROT 8.1  --   --    ALKPHOS 67  --   --    ALT 24  --   --    AST 42*  --   --    BILITOT 0.3  --   --    AMYLASE  --  69  --    LIPASE 112*  --  566*       Recent Labs     04/05/19 1700   INR 1.1       Recent Labs     04/05/19  1700 04/05/19  2257 04/06/19  0508   TROPONINI <0.01 <0.01 <0.01       Chronic labs:  Lab Results   Component Value Date    CHOL 177 04/06/2019    TRIG 213 (H) 04/06/2019    HDL 35 04/06/2019    LDLCALC 99 04/06/2019    TSH 6.670 (H) 04/05/2019    INR 1.1 04/05/2019    LABA1C 7.4 (H) 04/05/2019       Radiology:  Imaging studies reviewed today. ASSESSMENT:    Active Problems:    Chest pain    Elevated lipase    Accelerated hypertension    Uncontrolled type 2 diabetes mellitus with hyperglycemia (HCC)    Incomplete right bundle branch block    Headache    Obesity (BMI 30-39. 9)    Class 1 obesity due to excess calories in adult  Resolved Problems:    AAA (abdominal aortic aneurysm) (HCC)       PLAN:    GI consult in light of increased lipase; Follow CMP / lipase; US GB unremarkable. Discussed with GI, Triglycerides do not explain pancreatitis  I appreciate cardiology management to obtain BP control  Possible non urgent PCI for Monday  A1c noted, dietary / lifestyle mods for DM    Diet: DIET GENERAL; No Caffeine  Code Status: Full Code      +++++++++++++++++++++++++++++++++++++++++++++++++  Rodger Medina MD   [facility].  +++++++++++++++++++++++++++++++++++++++++++++++++  NOTE: This report was transcribed using voice recognition software.  Every effort was made to ensure accuracy; however, inadvertent computerized transcription errors may be present.

## 2019-04-06 NOTE — H&P
Hospital Medicine History & Physical      PCP: No primary care provider on file. Date of Admission: 4/5/2019    Date of Service: Pt seen/examined on 4/5/19 and Placed in Observation. Chief Complaint:  Chest pain      History Of Present Illness:      81 y.o. male who presented to [facility] with past medical history of diabetes, hypertension, aortic aneurysm, hypothyroidism and obesity. Patient started having chest pain around 3 PM, pain is in the central part of the chest, he attributed this initially to the bologna sandwich that he had, described as heaviness, 5 out of 10, radiated into the right arm, worsened with exertion and relieved by resting and nitroglycerin, associated with dizziness and near syncopal episode, shortness of breath and elevated blood pressure. He denies any vomiting or diaphoresis. He has been having left arm pain on and off for about 6 months as well as dyspnea on moderate exertion. Recently had cough and sinus infection, he denies any fever. No leg swelling, change in bowel habits or urinary complaints. Patient had stress test, 2-D echo and [facility], he said the stress test was normal and his echo showed \"valve narrowing\". Vitals notable for blood pressure of 240/90, labs shows AST 42, lipase 112, glucose 252, negative troponin. Chest x-ray shows nonspecific bibasilar airspace disease, thoracic aorta calcification, CTA of the chest shows coronary artery calcifications but no PE or dissections. EKG shows normal sinus rhythm rate of 86 with incomplete right bundle branch block. He is being admitted for further management.     Past Medical History:          Diagnosis Date    Arthritis     Diabetes mellitus ([address]     Hypercholesteremia     Hypertension     Squamous cell carcinoma     Bottom lip had cancer cut off    Thyroid disease        Past Surgical History:          Procedure Laterality Date    CARPAL TUNNEL RELEASE bilateral    COSMETIC SURGERY      for his cancer on his lower lip    FRACTURE SURGERY      Fractured left ankle times two    FRACTURE SURGERY      broke 2 transverse processes in his back    HEMORRHOID SURGERY      SHOULDER SURGERY      right    SKIN BIOPSY      TONSILLECTOMY         Medications Prior to Admission:      Prior to Admission medications    Medication Sig Start Date End Date Taking? Authorizing Provider   GLIPIZIDE PO Take  by mouth 2 times daily. Historical Provider, MD   metFORMIN (GLUCOPHAGE) 1000 MG tablet Take 1,000 mg by mouth 2 times daily (with meals). Historical Provider, MD   hydrochlorothiazide (MICROZIDE) 12.5 MG capsule Take 12.5 mg by mouth daily. Historical Provider, MD   LISINOPRIL PO Take  by mouth. Historical Provider, MD       Allergies:  Patient has no known allergies. Social History:      The patient currently lives at home, here with his family. TOBACCO:   reports that he has quit smoking. He quit smokeless tobacco use about 30 years ago. ETOH:   reports that he does not drink alcohol. No drug use. Family History:     Reviewed in detail Positive as follows:        Problem Relation Age of Onset   Merna Hash Alzheimer's Disease Mother     Stroke Father     Cancer Sister        REVIEW OF SYSTEMS:   Pertinent positives as noted in the HPI. All other systems reviewed and negative. PHYSICAL EXAM:    BP (!) 202/106   Pulse 58   Temp 98 °F (36.7 °C) (Temporal)   Resp 16   Ht 5' 9\" (1.753 m)   Wt 204 lb 8 oz (92.8 kg)   SpO2 98%   BMI 30.20 kg/m²     General appearance:  Elderly male, obese, No apparent distress, appears stated age and cooperative. Afebrile. HEENT:  Normal cephalic, atraumatic with bilateral parotid fullness. Pupils equal, round, and reactive to light. Extra ocular muscles intact. Conjunctivae/corneas clear. Neck: Supple, with full range of motion. No jugular venous distention. Trachea midline. Respiratory:  Normal respiratory effort. Clear to auscultation, bilaterally without Rales/Wheezes/Rhonchi. Cardiovascular:  Regular rate and rhythm with normal S1/S2 without murmurs, rubs or gallops. Abdomen: Soft, non-tender, non-distended with normal bowel sounds. Musculoskeletal:  No clubbing, cyanosis or edema bilaterally. Full range of motion without deformity. Skin: Skin color, texture, turgor normal.  No rashes or lesions. Neurologic:  Neurovascularly intact without any focal sensory/motor deficits. Cranial nerves: II-XII intact, grossly non-focal.  Psychiatric:  Alert and oriented, thought content appropriate, normal insight  Capillary Refill: Brisk,< 3 seconds   Peripheral Pulses: +2 palpable, equal bilaterally       Labs:     Recent Labs     04/05/19  1700   WBC 7.0   HGB 15.6   HCT 46.6        Recent Labs     04/05/19  1653 04/05/19  1700   NA  --  138   K  --  4.9   CL  --  100   CO2  --  26   BUN  --  18   CREATININE 1.1 1.1   CALCIUM  --  9.4     Recent Labs     04/05/19  1700   AST 42*   ALT 24   BILITOT 0.3   ALKPHOS 67     Recent Labs     04/05/19  1700   INR 1.1     Recent Labs     04/05/19  1700   TROPONINI <0.01       Urinalysis:    No results found for: Joelle Lango, [address], BACTERIA, RBCUA, BLOODU, [address], [place] [address]    Radiology: Reviewed and documented    CTA CHEST W CONTRAST   Final Result   There is no thoracic aortic aneurysm, aortic dissection or central   pulmonary embolism. There is coronary artery calcifications. XR CHEST PORTABLE   Final Result   1. Vascular calcifications thoracic aorta. 2. Nonspecific bibasilar airspace disease, findings can be seen   infiltrate/pneumonia and/or atelectasis.           ASSESSMENT:    Active Hospital Problems    Diagnosis Date Noted    Chest pain, rule out acute myocardial infarction [R07.9] 04/05/2019    Elevated lipase [R74.8] 04/05/2019    Accelerated hypertension [I10] 04/05/2019    Uncontrolled type 2 diabetes mellitus with hyperglycemia ([facility] [address] [E11.65] 04/05/2019

## 2019-04-06 NOTE — CONSULTS
Inpatient Cardiology Consultation      Reason for Consult:  Chest pain     Consulting Physician: Dr. Sharolyn Denver     Requesting Physician:  Dr. Denisse Pollack    Date of Consultation: 4/6/2019    HISTORY OF PRESENT ILLNESS:     This is a 72yo male who not known to [facility]) Cardiology. Past medical history includes DM, obesity, HTN, hypothyroidism and hyperlipidemia ( intolerant to statins). Home medications include lisinopril 20 mg daily, Zetia 10 mg daily-- he states he is not taking HCTZ. He presented to the ED 4/5/2019 regarding chest pain. Recently went to the [address] [facility] [address]- Had \"US of the heart and a stress test\" March 19, 2019- which he reports were \"fine\". Done d/t reports of chest pain and arm discomfort, SOB. These records are not available for review. States this chest pain is different. Chest pressure- midsternal, radiation into right arm accompanied by fatigue and weakness. States he was at the grocery store when this occurred. Also admits to SOB, confusion and near near syncope. Took BP at home- 225/90 - went to ED. Monitors BP at home, highest he states is . Continued to have pain until arrival to ED and received SL nitro and pain subsided. Admits to mild intermittent chest tightness, right arm \"aching\" since admission however again very mild . This is the first time he has felt anything like this before. Troponin negative times two. EKG with no acute concerns. BP is not well controlled. He received 325 mg asa in ED. Please note: past medical records were reviewed per electronic medical record (EMR) - see detailed reports under Past Medical/ Surgical History. Past Medical History:      1. DM   2. Obesity   3. HTN -- states he has not been taking HCTZ at home   4. Lung nodules   5. Hypothyroidism   6. HLD   7. BPH   8.  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 Authorizing Provider   levothyroxine (SYNTHROID) 25 MCG tablet Take 25 mcg by mouth Daily   Yes Historical Provider, MD   ketotifen ( KETOTIFEN FUMARATE) 0.025 % ophthalmic solution Place 1 drop into both eyes 2 times daily   Yes Historical Provider, MD   tamsulosin (FLOMAX) 0.4 MG capsule Take 0.8 mg by mouth daily   Yes Historical Provider, MD   ezetimibe (ZETIA) 10 MG tablet Take 10 mg by mouth daily Patient can not take regular statins   Yes Historical Provider, MD   GLIPIZIDE PO Take 10 mg by mouth daily     Historical Provider, MD   metFORMIN (GLUCOPHAGE) 1000 MG tablet Take 1,000 mg by mouth Daily with supper     Historical Provider, MD   hydrochlorothiazide (MICROZIDE) 12.5 MG capsule Take 12.5 mg by mouth daily.     Historical Provider, MD   LISINOPRIL PO Take 20 mg by mouth     Historical Provider, MD       Current Medications:    Current Facility-Administered Medications: 0.9 % sodium chloride infusion, , Intravenous, Continuous  nitroGLYCERIN (NITROSTAT) SL tablet 0.4 mg, 0.4 mg, Sublingual, Q5 Min PRN  sodium chloride flush 0.9 % injection 10 mL, 10 mL, Intravenous, 2 times per day  sodium chloride flush 0.9 % injection 10 mL, 10 mL, Intravenous, PRN  magnesium hydroxide (MILK OF MAGNESIA) 400 MG/5ML suspension 30 mL, 30 mL, Oral, Daily PRN  ondansetron (ZOFRAN) injection 4 mg, 4 mg, Intravenous, Q6H PRN  atorvastatin (LIPITOR) tablet 40 mg, 40 mg, Oral, Nightly  aspirin chewable tablet 81 mg, 81 mg, Oral, Daily  enoxaparin (LOVENOX) injection 40 mg, 40 mg, Subcutaneous, Daily  levothyroxine (SYNTHROID) tablet 25 mcg, 25 mcg, Oral, Daily  glipiZIDE (GLUCOTROL) tablet 10 mg, 10 mg, Oral, Daily  ketotifen (ZADITOR) 0.025 % ophthalmic solution 1 drop, 1 drop, Both Eyes, BID  lisinopril (PRINIVIL;ZESTRIL) tablet 20 mg, 20 mg, Oral, Daily  hydrochlorothiazide (HYDRODIURIL) tablet 12.5 mg, 12.5 mg, Oral, Daily  tamsulosin (FLOMAX) capsule 0.8 mg, 0.8 mg, Oral, Daily  insulin lispro (HUMALOG) injection vial 0-10 Units, 0-10 Units, Subcutaneous, 4x Daily AC & HS  glucose (GLUTOSE) 40 % oral gel 15 g, 15 g, Oral, PRN  dextrose 50 % solution 12.5 g, 12.5 g, Intravenous, PRN  glucagon (rDNA) injection 1 mg, 1 mg, Intramuscular, PRN  dextrose 5 % solution, 100 mL/hr, Intravenous, PRN    Allergies:  Patient has no known allergies. Social History:    ETOH- denies  Tobacco- Quit in 1970's, smoked for two- three years   Illicit- denies   Activity- walks for exercise -- no chest pain with this   Work - retired     Family History:   Family History   Problem Relation Age of Onset    Alzheimer's Disease Mother     Stroke Father     Cancer Sister        REVIEW OF SYSTEMS:     · Constitutional: Denies fevers, chills or night sweats  · Eyes: Denies visual changes or drainage  · ENT: Denies headaches or hearing loss. No mouth sores or sore throat. No epistaxis   · Cardiovascular: See HPI   · Respiratory: Denies PND. No hemoptysis + nonproductive cough . + orthopnea   · Gastrointestinal: Denies hematemesis or anorexia. No hematochezia or melena    · Genitourinary: Denies urgency, dysuria or hematuria. · Musculoskeletal: Denies gait disturbance or joint complaints  · Integumentary: Denies rash, hives or pruritis   · Neurological: Denies dizziness, headaches or seizures. No numbness or tingling  · Psychiatric: Denies anxiety or depression. · Endocrine: Denies temperature intolerance. No recent weight change. .  · Hematologic/Lymphatic: Denies abnormal bruising or bleeding.  No swollen lymph nodes    PHYSICAL EXAM:   BP (!) 170/71   Pulse 53   Temp 97.7 °F (36.5 °C) (Temporal)   Resp 14   Ht 5' 9\" (1.753 m)   Wt 203 lb 11.2 oz (92.4 kg)   SpO2 98%   BMI 30.08 kg/m²   CONST:  Well developed, well nourished obese male who appears of stated age. Awake, alert and cooperative. No apparent distress. CHEST: Chest symmetrical and non-tender to palpation. No accessory muscle use or intercostal retractions  RESPIRATORY: Lung sounds - clear throughout fields   CARDIOVASCULAR:  Heart Inspection- shows no noted pulsations  Heart Palpation- no heaves or thrills; PMI is non-displaced   Heart Ausculation- Regular rate and rhythm,No murmur noted   PV: No lower extremity edema. No varicosities. Feet appear to be well perfused   ABDOMEN: Soft, non-tender to light palpation. Bowel sounds present. MS: Good muscle strength and tone. No atrophy or abnormal movements. : Deferred  SKIN: Warm and dry no statis dermatitis or ulcers   NEURO / PSYCH: Oriented to person, place and time. Speech clear and appropriate. Follows all commands. Pleasant affect     DATA:      12 lead EK2019: SR     Tele: SR- no events overnight       Intake/Output Summary (Last 24 hours) at 2019 0804  Last data filed at 2019 0509  Gross per 24 hour   Intake 360 ml   Output 375 ml   Net -15 ml     Labs:   CBC:   Recent Labs     19  1700 19  0508   WBC 7.0 5.7   HGB 15.6 14.0   HCT 46.6 41.8    301     BMP:   Recent Labs     19  1700 19  0508    143   K 4.9 3.9   CO2 26 23   BUN 18 15   CREATININE 1.1 1.1   LABGLOM >60 >60   CALCIUM 9.4 8.8     Mag: No results for input(s): MG in the last 72 hours. Phos: No results for input(s): PHOS in the last 72 hours. TSH:   Recent Labs     19  2257   TSH 6.670*     HgA1c:   Lab Results   Component Value Date    LABA1C 7.4 (H) 2019     No results found for: EAG  proBNP: No results for input(s): PROBNP in the last 72 hours.   PT/INR:   Recent Labs     19  1700   PROTIME 12.2   INR 1.1     APTT:No results for input(s): APTT in the last 72 hours. CARDIAC ENZYMES:  Recent Labs     04/05/19  1700 04/05/19  2257 04/06/19  0508   TROPONINI <0.01 <0.01 <0.01     FASTING LIPID PANEL:  Lab Results   Component Value Date    CHOL 177 04/06/2019    HDL 35 04/06/2019    LDLCALC 99 04/06/2019    TRIG 213 04/06/2019     LIVER PROFILE:  Recent Labs     04/05/19  1700   AST 42*   ALT 24   LABALBU 4.3     CXR: 4/5/2019:  Findings: There is a normal cardiomediastinal silhouette with thoracic   aortic vascular calcifications with bibasilar airspace disease,   nonspecific finding. No pneumothorax.           Impression   1. Vascular calcifications thoracic aorta. 2. Nonspecific bibasilar airspace disease, findings can be seen   infiltrate/pneumonia and/or atelectasis. CTA Chest 4/5/2019: There is no thoracic aortic aneurysm, aortic dissection or central   pulmonary embolism. There is coronary artery calcifications. Keep NPO for now until evaluated by Dr Rossy Ramirez   No acute findings thus far     ASSESSMENT AND PLAN PER DR Rossy Ramirez     Electronically signed by NAEEM Cam CNP on 4/6/2019 at 8:04 AM      I have personally seen and evaluated the patient. I personally obtained the history and performed the physical exam.  I personally reviewed all of the above labs, history, review of systems, and data. All of the assessments and recommendations are from me. All of the above cardiac medical decisions are from me. Please see my additional contributions to the history, physical exam, assessment, and recommendations below. History of chief complaint:  He has been complaining of chest discomfort for the past few months. He states that this usually occurs when he is bending over to do tasks. He states that he had a stress test a few days ago at the [address] and he was told that this was negative. However, yesterday while walking through the store he developed a midsternal chest discomfort.  This radiated into his right arm. This caused shortness of breath and near syncope. He states that his blood pressure at home was 225/90 and he therefore presented to the emergency room. Is given nitroglycerin and his symptoms resolved. He is currently pain-free. .    Review of systems:     Heart: as above   Lungs: as above   Eyes: denies changes in vision or discharge. Ears: denies changes in hearing or pain. Nose: denies epistaxis or masses   Throat: denies sore throat or trouble swallowing. Neuro: denies numbness, tingling, tremors. Skin: denies rashes or itching. : denies hematuria, dysuria   GI: denies vomiting, diarrhea   Psych: denies mood changed, anxiety, depression. Physical exam:  BP (!) 151/92   Pulse 63   Temp 97.7 °F (36.5 °C) (Temporal)   Resp 16   Ht 5' 9\" (1.753 m)   Wt 203 lb 11.2 oz (92.4 kg)   SpO2 97%   BMI 30.08 kg/m²   Constitutional: A&O x3, communicates well, no acute distress. Eyes: extraocular muscles intact, PERRL. Normal lids & conjunctiva. No icterus. ENT: clear, no bleeding. No external masses. Lips normal formation. Neck: supple, full ROM, no JVD, no bruits, no lymphadenopathy. No masses. trachea midline. Heart: regular rate & rhythm, normal S1 & S2, I/VI (normal physiologic) systolic murmur, S4 gallop. No heave. Lungs: CTA. No accessory muscles. Abd: soft, non-tender. Normal bowel sounds. Obese. Neuro: Full ROM X 4, EOMI, no tremors. EXT: No bilateral lower extremity edema  Skin: warm, dry, intact. Good turgor. Psych: A&O x 3, normal behavior, not anxious. Patient seen and examined. Chart, labs & data reviewed. A:  1. Chest discomfort concerning for unstable angina. Negative ECG and enzymes. Negative stress test several days ago. 2. Uncontrolled severe hypertension. This may be contributing to his symptoms. 3. Obesity. 4. Possible sleep apnea. 5. Diabetes  6.  Hypercholesterolemia area he states that he is intolerant to statins. 7. Hypothyroidism      Rec:  1. I'm trying to obtain his stress test and echo performed at the [place]. 2. Continue to try to obtain blood pressure control. I will help and add Norvasc now. 3. Aspirin. 4. His heart rate is too low for beta blockers. 5. Cardiac catheterization and possible PCI  CATH RISKS:  I discussed the risks and benefits of cardiac catheterization and percutaneous coronary intervention including but not limited to exposure to radiation, bleeding, infection, sedation, allergy, peripheral embolization, acute renal failure, vascular damage, emergent CABG, CVA, MI, and death. He/she states that he/she understands this and agrees to proceed.     Electronically signed by Jhon Cordon DO on 4/6/2019 at 11:22 AM

## 2019-04-06 NOTE — PLAN OF CARE
Question as to pain, location, radiation and relief after analgesic given. Ask patient to rate pain on a scale of 0-10. Have patient describe where the pain is and how intense it is, does it radiate anywhere, do you break out in a sweat or get nauseated?

## 2019-04-07 LAB
ALBUMIN SERPL-MCNC: 3.7 G/DL (ref 3.5–5.2)
ALP BLD-CCNC: 61 U/L (ref 40–129)
ALT SERPL-CCNC: 17 U/L (ref 0–40)
ANION GAP SERPL CALCULATED.3IONS-SCNC: 15 MMOL/L (ref 7–16)
AST SERPL-CCNC: 16 U/L (ref 0–39)
BILIRUB SERPL-MCNC: 0.3 MG/DL (ref 0–1.2)
BUN BLDV-MCNC: 19 MG/DL (ref 8–23)
CALCIUM SERPL-MCNC: 9 MG/DL (ref 8.6–10.2)
CHLORIDE BLD-SCNC: 108 MMOL/L (ref 98–107)
CO2: 21 MMOL/L (ref 22–29)
CREAT SERPL-MCNC: 1.1 MG/DL (ref 0.7–1.2)
GFR AFRICAN AMERICAN: >60
GFR NON-AFRICAN AMERICAN: >60 ML/MIN/1.73
GLUCOSE BLD-MCNC: 142 MG/DL (ref 74–99)
LIPASE: 262 U/L (ref 13–60)
METER GLUCOSE: 131 MG/DL (ref 74–99)
METER GLUCOSE: 212 MG/DL (ref 74–99)
METER GLUCOSE: 244 MG/DL (ref 74–99)
POTASSIUM REFLEX MAGNESIUM: 4.2 MMOL/L (ref 3.5–5)
SODIUM BLD-SCNC: 144 MMOL/L (ref 132–146)
TOTAL PROTEIN: 6.7 G/DL (ref 6.4–8.3)

## 2019-04-07 PROCEDURE — 6360000002 HC RX W HCPCS: Performed by: INTERNAL MEDICINE

## 2019-04-07 PROCEDURE — 6370000000 HC RX 637 (ALT 250 FOR IP): Performed by: INTERNAL MEDICINE

## 2019-04-07 PROCEDURE — 2580000003 HC RX 258: Performed by: INTERNAL MEDICINE

## 2019-04-07 PROCEDURE — 82962 GLUCOSE BLOOD TEST: CPT

## 2019-04-07 PROCEDURE — 6370000000 HC RX 637 (ALT 250 FOR IP): Performed by: FAMILY MEDICINE

## 2019-04-07 PROCEDURE — 6370000000 HC RX 637 (ALT 250 FOR IP): Performed by: EMERGENCY MEDICINE

## 2019-04-07 PROCEDURE — 83690 ASSAY OF LIPASE: CPT

## 2019-04-07 PROCEDURE — 99233 SBSQ HOSP IP/OBS HIGH 50: CPT | Performed by: INTERNAL MEDICINE

## 2019-04-07 PROCEDURE — G0378 HOSPITAL OBSERVATION PER HR: HCPCS

## 2019-04-07 PROCEDURE — 80053 COMPREHEN METABOLIC PANEL: CPT

## 2019-04-07 PROCEDURE — 36415 COLL VENOUS BLD VENIPUNCTURE: CPT

## 2019-04-07 PROCEDURE — 96372 THER/PROPH/DIAG INJ SC/IM: CPT

## 2019-04-07 PROCEDURE — 6370000000 HC RX 637 (ALT 250 FOR IP): Performed by: NURSE PRACTITIONER

## 2019-04-07 RX ORDER — HYDROCHLOROTHIAZIDE 25 MG/1
25 TABLET ORAL DAILY
Status: DISCONTINUED | OUTPATIENT
Start: 2019-04-08 | End: 2019-04-09 | Stop reason: HOSPADM

## 2019-04-07 RX ORDER — ACETAMINOPHEN 325 MG/1
650 TABLET ORAL EVERY 4 HOURS PRN
Status: DISCONTINUED | OUTPATIENT
Start: 2019-04-07 | End: 2019-04-09 | Stop reason: HOSPADM

## 2019-04-07 RX ADMIN — KETOTIFEN FUMARATE 1 DROP: 0.35 SOLUTION/ DROPS OPHTHALMIC at 08:10

## 2019-04-07 RX ADMIN — ENOXAPARIN SODIUM 40 MG: 40 INJECTION SUBCUTANEOUS at 08:07

## 2019-04-07 RX ADMIN — LISINOPRIL 20 MG: 20 TABLET ORAL at 08:09

## 2019-04-07 RX ADMIN — NITROGLYCERIN 0.5 INCH: 20 OINTMENT TOPICAL at 17:47

## 2019-04-07 RX ADMIN — KETOTIFEN FUMARATE 1 DROP: 0.35 SOLUTION/ DROPS OPHTHALMIC at 21:34

## 2019-04-07 RX ADMIN — LEVOTHYROXINE SODIUM 25 MCG: 25 TABLET ORAL at 04:42

## 2019-04-07 RX ADMIN — INSULIN LISPRO 4 UNITS: 100 INJECTION, SOLUTION INTRAVENOUS; SUBCUTANEOUS at 21:34

## 2019-04-07 RX ADMIN — NITROGLYCERIN 0.5 INCH: 20 OINTMENT TOPICAL at 11:17

## 2019-04-07 RX ADMIN — NITROGLYCERIN 0.4 MG: 0.4 TABLET, ORALLY DISINTEGRATING SUBLINGUAL at 08:50

## 2019-04-07 RX ADMIN — TAMSULOSIN HYDROCHLORIDE 0.8 MG: 0.4 CAPSULE ORAL at 08:09

## 2019-04-07 RX ADMIN — ASPIRIN 81 MG 81 MG: 81 TABLET ORAL at 08:07

## 2019-04-07 RX ADMIN — HYDROCHLOROTHIAZIDE 12.5 MG: 12.5 TABLET ORAL at 08:08

## 2019-04-07 RX ADMIN — AMLODIPINE BESYLATE 5 MG: 5 TABLET ORAL at 08:07

## 2019-04-07 RX ADMIN — GLIPIZIDE 10 MG: 5 TABLET ORAL at 08:08

## 2019-04-07 RX ADMIN — INSULIN LISPRO 4 UNITS: 100 INJECTION, SOLUTION INTRAVENOUS; SUBCUTANEOUS at 17:47

## 2019-04-07 RX ADMIN — ACETAMINOPHEN 650 MG: 325 TABLET, FILM COATED ORAL at 15:25

## 2019-04-07 RX ADMIN — Medication 10 ML: at 08:10

## 2019-04-07 RX ADMIN — Medication 10 ML: at 21:34

## 2019-04-07 RX ADMIN — ACETAMINOPHEN 650 MG: 325 TABLET, FILM COATED ORAL at 09:13

## 2019-04-07 ASSESSMENT — PAIN DESCRIPTION - LOCATION
LOCATION: HEAD
LOCATION: HEAD

## 2019-04-07 ASSESSMENT — PAIN DESCRIPTION - DESCRIPTORS
DESCRIPTORS: ACHING
DESCRIPTORS: ACHING

## 2019-04-07 ASSESSMENT — PAIN SCALES - GENERAL
PAINLEVEL_OUTOF10: 2
PAINLEVEL_OUTOF10: 0
PAINLEVEL_OUTOF10: 0
PAINLEVEL_OUTOF10: 4
PAINLEVEL_OUTOF10: 4
PAINLEVEL_OUTOF10: 2
PAINLEVEL_OUTOF10: 0
PAINLEVEL_OUTOF10: 0

## 2019-04-07 ASSESSMENT — PAIN DESCRIPTION - ORIENTATION
ORIENTATION: ANTERIOR
ORIENTATION: ANTERIOR

## 2019-04-07 ASSESSMENT — PAIN DESCRIPTION - PROGRESSION
CLINICAL_PROGRESSION: GRADUALLY IMPROVING
CLINICAL_PROGRESSION: GRADUALLY IMPROVING

## 2019-04-07 ASSESSMENT — PAIN DESCRIPTION - PAIN TYPE
TYPE: ACUTE PAIN
TYPE: ACUTE PAIN

## 2019-04-07 ASSESSMENT — PAIN DESCRIPTION - ONSET
ONSET: GRADUAL
ONSET: GRADUAL

## 2019-04-07 ASSESSMENT — PAIN DESCRIPTION - FREQUENCY
FREQUENCY: CONTINUOUS
FREQUENCY: CONTINUOUS

## 2019-04-07 NOTE — PROGRESS NOTES
PROGRESS NOTE     CARDIOLOGY    Chief complaint: Seen today for follow up, management & recommendations for chest pain    He denies chest pain or shortness of breath today. He was sitting in a chair at the side of the bed. He was comfortable and in no distress. Wt Readings from Last 3 Encounters:   04/06/19 203 lb 11.2 oz (92.4 kg)     Temp Readings from Last 3 Encounters:   04/06/19 97.8 °F (36.6 °C)     BP Readings from Last 3 Encounters:   04/06/19 (!) 148/70     Pulse Readings from Last 3 Encounters:   04/07/19 60         Intake/Output Summary (Last 24 hours) at 4/7/2019 0909  Last data filed at 4/7/2019 0455  Gross per 24 hour   Intake 1204 ml   Output 600 ml   Net 604 ml       Recent Labs     04/05/19  1700 04/06/19  0508   WBC 7.0 5.7   HGB 15.6 14.0   HCT 46.6 41.8   MCV 83.8 85.0    301     Recent Labs     04/05/19  1700 04/06/19  0508 04/07/19  0443    143 144   K 4.9 3.9 4.2    107 108*   CO2 26 23 21*   BUN 18 15 19   CREATININE 1.1 1.1 1.1     Recent Labs     04/05/19  1700   PROTIME 12.2   INR 1.1     Recent Labs     04/05/19  1700 04/05/19  2257 04/06/19  0508   TROPONINI <0.01 <0.01 <0.01     No results for input(s): BNP in the last 72 hours.   Recent Labs     04/06/19  0508   CHOL 177   HDL 35   TRIG 213*           acetaminophen (TYLENOL) tablet 650 mg Q4H PRN   amLODIPine (NORVASC) tablet 5 mg Daily   nitroGLYCERIN (NITROSTAT) SL tablet 0.4 mg Q5 Min PRN   sodium chloride flush 0.9 % injection 10 mL 2 times per day   sodium chloride flush 0.9 % injection 10 mL PRN   magnesium hydroxide (MILK OF MAGNESIA) 400 MG/5ML suspension 30 mL Daily PRN   ondansetron (ZOFRAN) injection 4 mg Q6H PRN   aspirin chewable tablet 81 mg Daily   enoxaparin (LOVENOX) injection 40 mg Daily   levothyroxine (SYNTHROID) tablet 25 mcg Daily   glipiZIDE (GLUCOTROL) tablet 10 mg Daily   ketotifen (ZADITOR) 0.025 % ophthalmic solution 1 drop BID   lisinopril (PRINIVIL;ZESTRIL) tablet 20 mg Daily intolerant to statins. 7. Hypothyroidism  8. Received his records from the [place]. His echo shows mild aortic stenosis. CATH RISKS:  I discussed the risks and benefits of cardiac catheterization and percutaneous coronary intervention including but not limited to exposure to radiation, bleeding, infection, sedation, allergy, peripheral embolization, acute renal failure, vascular damage, emergent CABG, CVA, MI, and death. He/she states that he/she understands this and agrees to proceed.

## 2019-04-07 NOTE — CONSULTS
scan of the chest.  The pancreas is  perfectly normal.    ASSESSMENT:  Elevated lipase. His renal function is normal.  His lipase  was 112 on admission with a normal amylase and his repeat was 566. This  is an abnormality without evidence of disease. Lisinopril and metformin  Have been  Questioned as a potential source of pancreatitis, but this is not  Pancreatitis, based on history alone. 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 Usually the amylase  and lipase are expected simaltaneously. However,  I am certain that  there is no indication of any  further study.          Scott Morris MD    D: 04/06/2019 16:15:25       T: 04/06/2019 16:17:17     [email or web address]  Job#: [id number]     Doc#: [id number]    CC:

## 2019-04-07 NOTE — PROGRESS NOTES
Hospitalist Progress Note      Synopsis: Patient admitted on 4/5/2019 for CP. Patient seen by cardiology and gastroenterology for increased lipase. Obtain records from [address] [facility] Cardiac catheterization possible intervention planned for Monday. Subjective    No chest pain  No abdominal pain. Lipase improving. Exam:  BP (!) 148/70   Pulse 60   Temp 97.8 °F (36.6 °C)   Resp 16   Ht 5' 9\" (1.753 m)   Wt 203 lb 11.2 oz (92.4 kg)   SpO2 94%   BMI 30.08 kg/m²   General appearance: No apparent distress, appears stated age and cooperative. HEENT: Pupils equal, round, and reactive to light. Conjunctivae/corneas clear. Neck: Supple. No jugular venous distention. Trachea midline. Respiratory:  Normal respiratory effort. Clear to auscultation, bilaterally without Rales/Wheezes/Rhonchi. Cardiovascular: Regular rate and rhythm with normal S1/S2 without murmurs, rubs or gallops. Abdomen: Soft, non-tender, non-distended with normal bowel sounds. Musculoskeletal: No clubbing, cyanosis or edema bilaterally. Brisk capillary refill. 2+ lower extremity pulses (dorsalis pedis).    Skin:  No rashes    Neurologic: awake, alert and following commands     Medications:  Reviewed    Infusion Medications    dextrose       Scheduled Medications    amLODIPine  5 mg Oral Daily    sodium chloride flush  10 mL Intravenous 2 times per day    aspirin  81 mg Oral Daily    enoxaparin  40 mg Subcutaneous Daily    levothyroxine  25 mcg Oral Daily    glipiZIDE  10 mg Oral Daily    ketotifen  1 drop Both Eyes BID    lisinopril  20 mg Oral Daily    hydrochlorothiazide  12.5 mg Oral Daily    tamsulosin  0.8 mg Oral Daily    insulin lispro  0-10 Units Subcutaneous 4x Daily AC & HS     PRN Meds: nitroGLYCERIN, sodium chloride flush, magnesium hydroxide, ondansetron, glucose, dextrose, glucagon (rDNA), dextrose    I/O    Intake/Output Summary (Last 24 hours) at 4/7/2019 7140  Last data filed at 4/7/2019 0455  Gross per 24 hour Intake 1204 ml   Output 600 ml   Net 604 ml       Labs:   Recent Labs     04/05/19  1700 04/06/19  0508   WBC 7.0 5.7   HGB 15.6 14.0   HCT 46.6 41.8    301       Recent Labs     04/05/19  1700 04/06/19  0508 04/07/19  0443    143 144   K 4.9 3.9 4.2    107 108*   CO2 26 23 21*   BUN 18 15 19   CREATININE 1.1 1.1 1.1   CALCIUM 9.4 8.8 9.0       Recent Labs     04/05/19  1700 04/05/19  2257 04/06/19  0508 04/07/19  0443   PROT 8.1  --   --  6.7   ALKPHOS 67  --   --  61   ALT 24  --   --  17   AST 42*  --   --  16   BILITOT 0.3  --   --  0.3   AMYLASE  --  69  --   --    LIPASE 112*  --  566* 262*       Recent Labs     04/05/19 1700   INR 1.1       Recent Labs     04/05/19  1700 04/05/19  2257 04/06/19  0508   TROPONINI <0.01 <0.01 <0.01       Chronic labs:  Lab Results   Component Value Date    CHOL 177 04/06/2019    TRIG 213 (H) 04/06/2019    HDL 35 04/06/2019    LDLCALC 99 04/06/2019    TSH 6.670 (H) 04/05/2019    INR 1.1 04/05/2019    LABA1C 7.4 (H) 04/05/2019       Radiology:  Imaging studies reviewed today. ASSESSMENT:    Active Problems:    Chest pain    Elevated lipase    Accelerated hypertension    Uncontrolled type 2 diabetes mellitus with hyperglycemia (HCC)    Incomplete right bundle branch block    Headache    Obesity (BMI 30-39. 9)    Class 1 obesity due to excess calories in adult    Chest pain due to myocardial ischemia  Resolved Problems:    AAA (abdominal aortic aneurysm) (HCC)       PLAN:  Appreciate GS support and evaluation. Blood pressure management. Increase HCT / CCB  A1c noted, dietary / lifestyle mods for DM  Cardiac catheterization and intervention on Monday. Dispo: home at discharge.     Diet: DIET GENERAL; No Caffeine  Code Status: Full Code      +++++++++++++++++++++++++++++++++++++++++++++++++  Otto Oneill MD   [facility].  +++++++++++++++++++++++++++++++++++++++++++++++++  NOTE: This report was transcribed using voice recognition

## 2019-04-08 LAB
ABO/RH: NORMAL
ANTIBODY SCREEN: NORMAL
EKG ATRIAL RATE: 55 BPM
EKG P AXIS: 47 DEGREES
EKG P-R INTERVAL: 164 MS
EKG Q-T INTERVAL: 418 MS
EKG QRS DURATION: 110 MS
EKG QTC CALCULATION (BAZETT): 399 MS
EKG R AXIS: -39 DEGREES
EKG T AXIS: 18 DEGREES
EKG VENTRICULAR RATE: 55 BPM
LIPASE: 54 U/L (ref 13–60)
METER GLUCOSE: 139 MG/DL (ref 74–99)
METER GLUCOSE: 159 MG/DL (ref 74–99)
METER GLUCOSE: 167 MG/DL (ref 74–99)
METER GLUCOSE: 171 MG/DL (ref 74–99)
POC ACT LR: 222 SECONDS
POC ACT LR: 352 SECONDS

## 2019-04-08 PROCEDURE — 93010 ELECTROCARDIOGRAM REPORT: CPT | Performed by: INTERNAL MEDICINE

## 2019-04-08 PROCEDURE — 86900 BLOOD TYPING SEROLOGIC ABO: CPT

## 2019-04-08 PROCEDURE — C1874 STENT, COATED/COV W/DEL SYS: HCPCS

## 2019-04-08 PROCEDURE — 83690 ASSAY OF LIPASE: CPT

## 2019-04-08 PROCEDURE — 6370000000 HC RX 637 (ALT 250 FOR IP): Performed by: INTERNAL MEDICINE

## 2019-04-08 PROCEDURE — 6370000000 HC RX 637 (ALT 250 FOR IP): Performed by: FAMILY MEDICINE

## 2019-04-08 PROCEDURE — 99233 SBSQ HOSP IP/OBS HIGH 50: CPT | Performed by: INTERNAL MEDICINE

## 2019-04-08 PROCEDURE — 2500000003 HC RX 250 WO HCPCS

## 2019-04-08 PROCEDURE — 86850 RBC ANTIBODY SCREEN: CPT

## 2019-04-08 PROCEDURE — 93458 L HRT ARTERY/VENTRICLE ANGIO: CPT | Performed by: INTERNAL MEDICINE

## 2019-04-08 PROCEDURE — 6360000002 HC RX W HCPCS

## 2019-04-08 PROCEDURE — 85347 COAGULATION TIME ACTIVATED: CPT

## 2019-04-08 PROCEDURE — B2111ZZ FLUOROSCOPY OF MULTIPLE CORONARY ARTERIES USING LOW OSMOLAR CONTRAST: ICD-10-PCS | Performed by: STUDENT IN AN ORGANIZED HEALTH CARE EDUCATION/TRAINING PROGRAM

## 2019-04-08 PROCEDURE — C1887 CATHETER, GUIDING: HCPCS

## 2019-04-08 PROCEDURE — C1769 GUIDE WIRE: HCPCS

## 2019-04-08 PROCEDURE — 4A023N7 MEASUREMENT OF CARDIAC SAMPLING AND PRESSURE, LEFT HEART, PERCUTANEOUS APPROACH: ICD-10-PCS | Performed by: STUDENT IN AN ORGANIZED HEALTH CARE EDUCATION/TRAINING PROGRAM

## 2019-04-08 PROCEDURE — G0378 HOSPITAL OBSERVATION PER HR: HCPCS

## 2019-04-08 PROCEDURE — 86901 BLOOD TYPING SEROLOGIC RH(D): CPT

## 2019-04-08 PROCEDURE — 027035Z DILATION OF CORONARY ARTERY, ONE ARTERY WITH TWO DRUG-ELUTING INTRALUMINAL DEVICES, PERCUTANEOUS APPROACH: ICD-10-PCS | Performed by: STUDENT IN AN ORGANIZED HEALTH CARE EDUCATION/TRAINING PROGRAM

## 2019-04-08 PROCEDURE — C9600 PERC DRUG-EL COR STENT SING: HCPCS | Performed by: INTERNAL MEDICINE

## 2019-04-08 PROCEDURE — C1894 INTRO/SHEATH, NON-LASER: HCPCS

## 2019-04-08 PROCEDURE — 82962 GLUCOSE BLOOD TEST: CPT

## 2019-04-08 PROCEDURE — C1725 CATH, TRANSLUMIN NON-LASER: HCPCS

## 2019-04-08 PROCEDURE — 36415 COLL VENOUS BLD VENIPUNCTURE: CPT

## 2019-04-08 PROCEDURE — 2709999900 HC NON-CHARGEABLE SUPPLY

## 2019-04-08 PROCEDURE — 2580000003 HC RX 258: Performed by: INTERNAL MEDICINE

## 2019-04-08 PROCEDURE — B2151ZZ FLUOROSCOPY OF LEFT HEART USING LOW OSMOLAR CONTRAST: ICD-10-PCS | Performed by: STUDENT IN AN ORGANIZED HEALTH CARE EDUCATION/TRAINING PROGRAM

## 2019-04-08 PROCEDURE — 6370000000 HC RX 637 (ALT 250 FOR IP)

## 2019-04-08 RX ORDER — SODIUM CHLORIDE 9 MG/ML
INJECTION, SOLUTION INTRAVENOUS CONTINUOUS
Status: ACTIVE | OUTPATIENT
Start: 2019-04-08 | End: 2019-04-08

## 2019-04-08 RX ORDER — AMLODIPINE BESYLATE 10 MG/1
10 TABLET ORAL DAILY
Status: DISCONTINUED | OUTPATIENT
Start: 2019-04-09 | End: 2019-04-09 | Stop reason: HOSPADM

## 2019-04-08 RX ORDER — OXYCODONE HYDROCHLORIDE AND ACETAMINOPHEN 5; 325 MG/1; MG/1
1 TABLET ORAL EVERY 4 HOURS PRN
Status: DISCONTINUED | OUTPATIENT
Start: 2019-04-08 | End: 2019-04-09 | Stop reason: HOSPADM

## 2019-04-08 RX ORDER — ASPIRIN 81 MG/1
81 TABLET ORAL DAILY
Status: DISCONTINUED | OUTPATIENT
Start: 2019-04-08 | End: 2019-04-09 | Stop reason: HOSPADM

## 2019-04-08 RX ORDER — METOPROLOL SUCCINATE 25 MG/1
12.5 TABLET, EXTENDED RELEASE ORAL DAILY
Status: DISCONTINUED | OUTPATIENT
Start: 2019-04-08 | End: 2019-04-09 | Stop reason: HOSPADM

## 2019-04-08 RX ORDER — HYDRALAZINE HYDROCHLORIDE 20 MG/ML
5 INJECTION INTRAMUSCULAR; INTRAVENOUS EVERY 6 HOURS PRN
Status: DISCONTINUED | OUTPATIENT
Start: 2019-04-08 | End: 2019-04-09 | Stop reason: HOSPADM

## 2019-04-08 RX ORDER — ASPIRIN 81 MG/1
243 TABLET, CHEWABLE ORAL ONCE
Status: COMPLETED | OUTPATIENT
Start: 2019-04-08 | End: 2019-04-08

## 2019-04-08 RX ADMIN — INSULIN LISPRO 2 UNITS: 100 INJECTION, SOLUTION INTRAVENOUS; SUBCUTANEOUS at 11:26

## 2019-04-08 RX ADMIN — METOPROLOL SUCCINATE 12.5 MG: 25 TABLET, EXTENDED RELEASE ORAL at 11:23

## 2019-04-08 RX ADMIN — ASPIRIN 81 MG 81 MG: 81 TABLET ORAL at 07:08

## 2019-04-08 RX ADMIN — INSULIN LISPRO 2 UNITS: 100 INJECTION, SOLUTION INTRAVENOUS; SUBCUTANEOUS at 21:23

## 2019-04-08 RX ADMIN — HYDROCHLOROTHIAZIDE 25 MG: 25 TABLET ORAL at 07:39

## 2019-04-08 RX ADMIN — LEVOTHYROXINE SODIUM 25 MCG: 25 TABLET ORAL at 06:04

## 2019-04-08 RX ADMIN — KETOTIFEN FUMARATE 1 DROP: 0.35 SOLUTION/ DROPS OPHTHALMIC at 21:23

## 2019-04-08 RX ADMIN — INSULIN LISPRO 2 UNITS: 100 INJECTION, SOLUTION INTRAVENOUS; SUBCUTANEOUS at 17:49

## 2019-04-08 RX ADMIN — TICAGRELOR 90 MG: 90 TABLET ORAL at 21:23

## 2019-04-08 RX ADMIN — NITROGLYCERIN 0.5 INCH: 20 OINTMENT TOPICAL at 00:36

## 2019-04-08 RX ADMIN — ASPIRIN 81 MG 243 MG: 81 TABLET ORAL at 07:40

## 2019-04-08 RX ADMIN — Medication 10 ML: at 21:23

## 2019-04-08 RX ADMIN — LISINOPRIL 20 MG: 20 TABLET ORAL at 17:48

## 2019-04-08 RX ADMIN — NITROGLYCERIN 0.5 INCH: 20 OINTMENT TOPICAL at 06:04

## 2019-04-08 RX ADMIN — AMLODIPINE BESYLATE 7.5 MG: 5 TABLET ORAL at 07:39

## 2019-04-08 RX ADMIN — TAMSULOSIN HYDROCHLORIDE 0.8 MG: 0.4 CAPSULE ORAL at 11:23

## 2019-04-08 RX ADMIN — ACETAMINOPHEN 650 MG: 325 TABLET, FILM COATED ORAL at 06:04

## 2019-04-08 ASSESSMENT — PAIN SCALES - GENERAL
PAINLEVEL_OUTOF10: 3
PAINLEVEL_OUTOF10: 0

## 2019-04-08 NOTE — PROCEDURES
[address]                  [address] [place],  [facility]                            CARDIAC CATHETERIZATION    PATIENT NAME: Bowen Dickson                        :        1950  MED REC NO:   [id number]                            ROOM:       [id number]  ACCOUNT NO:   [de-identified]                           ADMIT DATE: 2019  PROVIDER:     Kiko Cee DO    DATE OF PROCEDURE:  2019    PROCEDURE:  1. Left heart catheterization. 2.  Percutaneous coronary intervention with sequential and overlapping  2.25 x 15 and 2.25 x 8 mm drug-eluting stents to the ostial and proximal  first diagonal.    PHYSICIAN:  Kiko Cee DO    INDICATIONS:  Unstable angina. COMPLICATIONS:  None. SEDATION:  Intravenous Versed. SEDATION TIME:  I was present for the sedation administration at 09:42  and I ended sedation at 10:48 for a total face-to-face sedation time of  1 hour and 6 minutes. HEMODYNAMIC RESULTS:  1. Opening aortic pressure 122/59 with a mean of 79.  2.  Left ventricular systolic pressure 677.  3.  LVEDP 9.  4.  No significant gradient across the aortic valve. ANGIOGRAPHIC RESULTS:  Left main:  Tortuous. No angiographically significant stenosis. LAD:  Tortuous. Mild diffuse luminal irregularities. D1:  Ostial diffuse greater than 95% stenosis. Tortuous vessel. CIRCUMFLEX:  Mild diffuse luminal irregularities. OM1:  The ostial proximal 10 to 20%, proximal diffuse 30% stenosis. Right coronary artery:  Dominant vessel. Minimal diffuse luminal  irregularities. LEFT VENTRICULOGRAM:  Normal LV size and systolic function. No wall  motion abnormalities. Ejection fraction 65%. INTERVENTION RESULTS:  Successful pre-dilatation of the ostial and  proximal diagonal with 2.25 mm balloon. Successful stenting of the ostial and proximal diagonal with a 2.25 x 15  mm Jett, drug-eluting stent.   There was residual lesion distal to the  stent that was not covered. Therefore, this was covered with a 2.25 x 8  mm Jett, drug-eluting stent in an overlapping fashion. The overlapping  portions were then postdilated with the stent balloon. This all  resulted in 0% residual stenosis and excellent BERNARD-3 flow. SUMMARY OF THE PROCEDURE:  After obtaining informed consent, the patient  was taken to the cardiac cath lab where the area of the right radial  artery was prepped and draped in sterile fashion. Using a micropuncture  technique, 6-[place] citizen Slender Glidesheath was placed in the right radial  artery. It was aspirated and flushed several times throughout the  procedure. He was given heparin systemically. A radial artery was  medicated with verapamil and nitroglycerin. A 5-[place] citizen TIG catheter was  advanced over wire to the root of the aorta. It was aspirated and  flushed with saline. Pressures were obtained. This was then filled  with contrast and manipulated in the left main coronary artery. Four  orthogonal views obtained. This was then removed, and an R4 was  advanced and manipulated in the right coronary artery. Three orthogonal  views obtained. The catheter was removed and a 5-[place] citizen angled pig tail  was advanced into the left ventricle. This was actually advanced into  the left ventricle before we went up with the R-4 catheter because the  wire slipped into the ventricle. Catheter was aspirated and flushed  with saline. Pressures were obtained. An SOMMER projection was obtained. Catheter was aspirated and flushed with saline once again. Pullback  pressures across the aortic valve were obtained. Then the R4 was  advanced and manipulated in the right coronary artery. He was  anticoagulated to maintain ACT greater 250. The 6-[place] citizen Q3.5 guiding  catheter was advanced and manipulated in the left main coronary artery. He was premedicated with aspirin and was given a Brilinta loading dose.    A Luge wire was successfully manipulated across the diagonal lesion into  the distal diagonal.  The lesion was then crossed and predilated with a  2.25 mm balloon. Lesion was then crossed and stented with a 2.25 x 15  mm [place], drug-eluting stent to 12 atmospheres of pressure. Subsequent  angiograms demonstrated that there was still some residual lesion distal  to the stent. Intracoronary nitroglycerin was administered which did  improve this, but there still appeared to be lesion there. Therefore  2.25 x 8 mm Jett, drug-eluting stent was passed through the first stent  and placed in an overlapping fashion distal to the first stent. This  was inflated to 12 atmospheres of pressure. The stent balloon was then  pulled back and the overlapping portions of the stent was then  postdilated with the stent balloon to 14 atmospheres of pressure. The  balloon wire removed, and followup angiograms were performed. This  demonstrated 0% residual stenosis and excellent BERNARD-3 flow. The right  radial artery sheath had been removed, and TR band placed successfully  with good patent hemostasis. He tolerated procedure well with no  complications.         Guerline Painter DO    D: 04/08/2019 11:19:51       T: 04/08/2019 11:23:00     [email or web address]  Job#: [id number]     Doc#: [id number]    CC:

## 2019-04-08 NOTE — PROGRESS NOTES
Hospitalist Progress Note      Synopsis: Patient admitted on 4/5/2019 for CP. Patient seen by cardiology and gastroenterology for increased lipase. Obtain records from [facility]. Cardiac catheterization possible intervention planned for Monday. Subjective    Chest pain this morning  For cardiac cath today   No abdominal pain. Lipase improving and back down to normal      Exam:  BP (!) 202/106   Pulse 69   Temp 98.2 °F (36.8 °C) (Temporal)   Resp 15   Ht 5' 9\" (1.753 m)   Wt 199 lb 12.8 oz (90.6 kg)   SpO2 95%   BMI 29.51 kg/m²   General appearance: No apparent distress, appears stated age and cooperative. HEENT: Pupils equal, round, and reactive to light. Conjunctivae/corneas clear. Neck: Supple. No jugular venous distention. Trachea midline. Respiratory:  Normal respiratory effort. Clear to auscultation, bilaterally without Rales/Wheezes/Rhonchi. Cardiovascular: Regular rate and rhythm with normal S1/S2 without murmurs, rubs or gallops. Abdomen: Soft, non-tender, non-distended with normal bowel sounds. Musculoskeletal: No clubbing, cyanosis or edema bilaterally. Brisk capillary refill. 2+ lower extremity pulses (dorsalis pedis).    Skin:  No rashes    Neurologic: awake, alert and following commands     Medications:  Reviewed    Infusion Medications    dextrose       Scheduled Medications    amLODIPine  7.5 mg Oral Daily    nitroglycerin  0.5 inch Topical 4 times per day    hydrochlorothiazide  25 mg Oral Daily    sodium chloride flush  10 mL Intravenous 2 times per day    aspirin  81 mg Oral Daily    enoxaparin  40 mg Subcutaneous Daily    levothyroxine  25 mcg Oral Daily    glipiZIDE  10 mg Oral Daily    ketotifen  1 drop Both Eyes BID    lisinopril  20 mg Oral Daily    tamsulosin  0.8 mg Oral Daily    insulin lispro  0-10 Units Subcutaneous 4x Daily AC & HS     PRN Meds: hydrALAZINE, acetaminophen, nitroGLYCERIN, sodium chloride flush, magnesium hydroxide, ondansetron, glucose, dextrose, Full Code      +++++++++++++++++++++++++++++++++++++++++++++++++  [address].  +++++++++++++++++++++++++++++++++++++++++++++++++  NOTE: This report was transcribed using voice recognition software.  Every effort was made to ensure accuracy; however, inadvertent computerized transcription errors may be present.

## 2019-04-09 VITALS
DIASTOLIC BLOOD PRESSURE: 61 MMHG | SYSTOLIC BLOOD PRESSURE: 120 MMHG | RESPIRATION RATE: 16 BRPM | HEART RATE: 56 BPM | HEIGHT: 69 IN | WEIGHT: 196.8 LBS | BODY MASS INDEX: 29.15 KG/M2 | OXYGEN SATURATION: 96 % | TEMPERATURE: 98.6 F

## 2019-04-09 PROBLEM — I20.0 UNSTABLE ANGINA (HCC): Status: ACTIVE | Noted: 2019-04-09

## 2019-04-09 LAB
ANION GAP SERPL CALCULATED.3IONS-SCNC: 13 MMOL/L (ref 7–16)
BUN BLDV-MCNC: 18 MG/DL (ref 8–23)
CALCIUM SERPL-MCNC: 9.1 MG/DL (ref 8.6–10.2)
CHLORIDE BLD-SCNC: 103 MMOL/L (ref 98–107)
CO2: 22 MMOL/L (ref 22–29)
CREAT SERPL-MCNC: 1.1 MG/DL (ref 0.7–1.2)
EKG ATRIAL RATE: 57 BPM
EKG ATRIAL RATE: 86 BPM
EKG P AXIS: 21 DEGREES
EKG P AXIS: 42 DEGREES
EKG P-R INTERVAL: 152 MS
EKG P-R INTERVAL: 158 MS
EKG Q-T INTERVAL: 362 MS
EKG Q-T INTERVAL: 404 MS
EKG QRS DURATION: 102 MS
EKG QRS DURATION: 118 MS
EKG QTC CALCULATION (BAZETT): 393 MS
EKG QTC CALCULATION (BAZETT): 433 MS
EKG R AXIS: -37 DEGREES
EKG R AXIS: -41 DEGREES
EKG T AXIS: 49 DEGREES
EKG T AXIS: 50 DEGREES
EKG VENTRICULAR RATE: 57 BPM
EKG VENTRICULAR RATE: 86 BPM
GFR AFRICAN AMERICAN: >60
GFR NON-AFRICAN AMERICAN: >60 ML/MIN/1.73
GLUCOSE BLD-MCNC: 152 MG/DL (ref 74–99)
METER GLUCOSE: 137 MG/DL (ref 74–99)
METER GLUCOSE: 175 MG/DL (ref 74–99)
POTASSIUM SERPL-SCNC: 3.8 MMOL/L (ref 3.5–5)
SODIUM BLD-SCNC: 138 MMOL/L (ref 132–146)

## 2019-04-09 PROCEDURE — 6370000000 HC RX 637 (ALT 250 FOR IP): Performed by: FAMILY MEDICINE

## 2019-04-09 PROCEDURE — 80048 BASIC METABOLIC PNL TOTAL CA: CPT

## 2019-04-09 PROCEDURE — G0378 HOSPITAL OBSERVATION PER HR: HCPCS

## 2019-04-09 PROCEDURE — 2140000000 HC CCU INTERMEDIATE R&B

## 2019-04-09 PROCEDURE — 82962 GLUCOSE BLOOD TEST: CPT

## 2019-04-09 PROCEDURE — 36415 COLL VENOUS BLD VENIPUNCTURE: CPT

## 2019-04-09 PROCEDURE — 93005 ELECTROCARDIOGRAM TRACING: CPT | Performed by: INTERNAL MEDICINE

## 2019-04-09 PROCEDURE — 6370000000 HC RX 637 (ALT 250 FOR IP): Performed by: INTERNAL MEDICINE

## 2019-04-09 PROCEDURE — 6360000002 HC RX W HCPCS: Performed by: INTERNAL MEDICINE

## 2019-04-09 PROCEDURE — 2580000003 HC RX 258: Performed by: INTERNAL MEDICINE

## 2019-04-09 PROCEDURE — 96372 THER/PROPH/DIAG INJ SC/IM: CPT

## 2019-04-09 PROCEDURE — 99232 SBSQ HOSP IP/OBS MODERATE 35: CPT | Performed by: INTERNAL MEDICINE

## 2019-04-09 PROCEDURE — 93010 ELECTROCARDIOGRAM REPORT: CPT | Performed by: INTERNAL MEDICINE

## 2019-04-09 RX ORDER — AMLODIPINE BESYLATE 10 MG/1
10 TABLET ORAL DAILY
Qty: 30 TABLET | Refills: 0 | Status: ON HOLD | OUTPATIENT
Start: 2019-04-09 | End: 2021-09-28

## 2019-04-09 RX ORDER — LISINOPRIL 20 MG/1
20 TABLET ORAL DAILY
Qty: 30 TABLET | Refills: 0 | Status: SHIPPED | OUTPATIENT
Start: 2019-04-09 | End: 2019-04-09 | Stop reason: HOSPADM

## 2019-04-09 RX ORDER — NITROGLYCERIN 0.4 MG/1
TABLET SUBLINGUAL
Qty: 25 TABLET | Refills: 0 | Status: SHIPPED | OUTPATIENT
Start: 2019-04-09

## 2019-04-09 RX ORDER — LISINOPRIL 20 MG/1
40 TABLET ORAL DAILY
Status: DISCONTINUED | OUTPATIENT
Start: 2019-04-10 | End: 2019-04-09 | Stop reason: HOSPADM

## 2019-04-09 RX ORDER — LISINOPRIL 20 MG/1
20 TABLET ORAL ONCE
Status: DISCONTINUED | OUTPATIENT
Start: 2019-04-09 | End: 2019-04-09 | Stop reason: HOSPADM

## 2019-04-09 RX ORDER — LISINOPRIL 40 MG/1
40 TABLET ORAL DAILY
Qty: 30 TABLET | Refills: 0 | Status: ON HOLD | OUTPATIENT
Start: 2019-04-10 | End: 2021-10-01 | Stop reason: HOSPADM

## 2019-04-09 RX ORDER — HYDROCHLOROTHIAZIDE 25 MG/1
25 TABLET ORAL DAILY
Qty: 30 TABLET | Refills: 0 | Status: SHIPPED | OUTPATIENT
Start: 2019-04-09 | End: 2019-09-01

## 2019-04-09 RX ORDER — ASPIRIN 81 MG/1
81 TABLET ORAL DAILY
Qty: 30 TABLET | Refills: 0 | Status: SHIPPED | OUTPATIENT
Start: 2019-04-09

## 2019-04-09 RX ORDER — METOPROLOL SUCCINATE 25 MG/1
12.5 TABLET, EXTENDED RELEASE ORAL DAILY
Qty: 30 TABLET | Refills: 0 | Status: ON HOLD | OUTPATIENT
Start: 2019-04-09 | End: 2021-10-01 | Stop reason: HOSPADM

## 2019-04-09 RX ADMIN — INSULIN LISPRO 2 UNITS: 100 INJECTION, SOLUTION INTRAVENOUS; SUBCUTANEOUS at 09:13

## 2019-04-09 RX ADMIN — ENOXAPARIN SODIUM 40 MG: 40 INJECTION SUBCUTANEOUS at 09:10

## 2019-04-09 RX ADMIN — HYDROCHLOROTHIAZIDE 25 MG: 25 TABLET ORAL at 09:12

## 2019-04-09 RX ADMIN — TICAGRELOR 90 MG: 90 TABLET ORAL at 09:12

## 2019-04-09 RX ADMIN — METOPROLOL SUCCINATE 12.5 MG: 25 TABLET, EXTENDED RELEASE ORAL at 09:11

## 2019-04-09 RX ADMIN — GLIPIZIDE 10 MG: 5 TABLET ORAL at 09:12

## 2019-04-09 RX ADMIN — LISINOPRIL 20 MG: 20 TABLET ORAL at 09:12

## 2019-04-09 RX ADMIN — ASPIRIN 81 MG: 81 TABLET ORAL at 09:12

## 2019-04-09 RX ADMIN — KETOTIFEN FUMARATE 1 DROP: 0.35 SOLUTION/ DROPS OPHTHALMIC at 09:11

## 2019-04-09 RX ADMIN — TAMSULOSIN HYDROCHLORIDE 0.8 MG: 0.4 CAPSULE ORAL at 09:12

## 2019-04-09 RX ADMIN — AMLODIPINE BESYLATE 10 MG: 10 TABLET ORAL at 09:12

## 2019-04-09 RX ADMIN — LEVOTHYROXINE SODIUM 25 MCG: 25 TABLET ORAL at 06:41

## 2019-04-09 RX ADMIN — Medication 10 ML: at 09:11

## 2019-04-09 ASSESSMENT — PAIN SCALES - GENERAL
PAINLEVEL_OUTOF10: 0
PAINLEVEL_OUTOF10: 0

## 2019-04-09 NOTE — PROGRESS NOTES
PROGRESS NOTE     CARDIOLOGY    Chief complaint: Seen today for follow up, management & recommendations for chest pain    He denies chest pain or shortness of breath today. He was lying in bed. He was comfortable and in no distress. He has been ambulating in the halls. Wt Readings from Last 3 Encounters:   04/09/19 196 lb 12.8 oz (89.3 kg)     Temp Readings from Last 3 Encounters:   04/09/19 97.7 °F (36.5 °C) (Oral)     BP Readings from Last 3 Encounters:   04/09/19 125/70     Pulse Readings from Last 3 Encounters:   04/09/19 58         Intake/Output Summary (Last 24 hours) at 4/9/2019 1046  Last data filed at 4/9/2019 0533  Gross per 24 hour   Intake 1030 ml   Output 0 ml   Net 1030 ml       No results for input(s): WBC, HGB, HCT, MCV, PLT in the last 72 hours. Recent Labs     04/07/19  0443 04/09/19  0345    138   K 4.2 3.8   * 103   CO2 21* 22   BUN 19 18   CREATININE 1.1 1.1     No results for input(s): PROTIME, INR in the last 72 hours. No results for input(s): CKTOTAL, CKMB, CKMBINDEX, TROPONINI in the last 72 hours. No results for input(s): BNP in the last 72 hours. No results for input(s): CHOL, HDL, TRIG in the last 72 hours.     Invalid input(s): CHOLHDLR, LDLCALCU        hydrALAZINE (APRESOLINE) injection 5 mg Q6H PRN   amLODIPine (NORVASC) tablet 10 mg Daily   metoprolol succinate (TOPROL XL) extended release tablet 12.5 mg Daily   aspirin EC tablet 81 mg Daily   ticagrelor (BRILINTA) tablet 90 mg BID   oxyCODONE-acetaminophen (PERCOCET) 5-325 MG per tablet 1 tablet Q4H PRN   acetaminophen (TYLENOL) tablet 650 mg Q4H PRN   hydrochlorothiazide (HYDRODIURIL) tablet 25 mg Daily   nitroGLYCERIN (NITROSTAT) SL tablet 0.4 mg Q5 Min PRN   sodium chloride flush 0.9 % injection 10 mL 2 times per day   sodium chloride flush 0.9 % injection 10 mL PRN   magnesium hydroxide (MILK OF MAGNESIA) 400 MG/5ML suspension 30 mL Daily PRN   ondansetron (ZOFRAN) injection 4 mg Q6H PRN   enoxaparin (LOVENOX) injection 40 mg Daily   levothyroxine (SYNTHROID) tablet 25 mcg Daily   glipiZIDE (GLUCOTROL) tablet 10 mg Daily   ketotifen (ZADITOR) 0.025 % ophthalmic solution 1 drop BID   lisinopril (PRINIVIL;ZESTRIL) tablet 20 mg Daily   tamsulosin (FLOMAX) capsule 0.8 mg Daily   insulin lispro (HUMALOG) injection vial 0-10 Units 4x Daily AC & HS   glucose (GLUTOSE) 40 % oral gel 15 g PRN   dextrose 50 % solution 12.5 g PRN   glucagon (rDNA) injection 1 mg PRN   dextrose 5 % solution PRN       Review of systems:     Heart: as above   Lungs: as above   Eyes: denies changes in vision or discharge. Ears: denies changes in hearing or pain. Nose: denies epistaxis or masses   Throat: denies sore throat or trouble swallowing. Neuro: denies numbness, tingling, tremors. Skin: denies rashes or itching. : denies hematuria, dysuria   GI: denies vomiting, diarrhea   Psych: denies mood changed, anxiety, depression. Physical exam:    Constitutional: A&O x3, communicates well, no acute distress. Eyes: extraocular muscles intact, PERRL. Normal lids & conjunctiva. No icterus. ENT: clear, no bleeding. No external masses. Lips normal formation. Neck: supple, full ROM, no JVD, no bruits, no lymphadenopathy. No masses. trachea midline. Heart: regular rate & rhythm, normal S1 & S2, I/VI (normal physiologic) systolic murmur, S4 gallop. No heave. Lungs: CTA. No accessory muscles. Abd: soft, non-tender. Normal bowel sounds. Neuro: Full ROM X 4, EOMI, no tremors. EXT: No bilateral lower extremity edema  Skin: warm, dry, intact. Good turgor. Psych: A&O x 3, normal behavior, not anxious. Radial access site: No complications. Assessment/Recommendations  1. CAD. Subtotaled ostial diagonal. Postop day one overlapping drug-eluting stents. Good results. 2. Uncontrolled, severe hypertension.    will increase his lisinopril to 40 mg but will defer further blood pressure management to the primary service. 3. Obesity. 4. Possible sleep apnea. I will defer to others. This will contribute to his uncontrolled blood pressure. 5. Diabetes  6. Hypercholesterolemia area he states that he is intolerant to statins. 7. Hypothyroidism  8. Received his records from the [facility]. His echo shows mild aortic stenosis.

## 2019-04-09 NOTE — DISCHARGE SUMMARY
Hospital Medicine Discharge Summary    Patient ID: Tammy Max      Patient's PCP: No primary care provider on file. Admit Date: 4/5/2019     Discharge Date:    04/09/19    Admitting Physician: Osiel Gomez MD     Discharge Physician: Katey Estes MD     Discharge Diagnoses: Active Hospital Problems    Diagnosis Date Noted    Class 1 obesity due to excess calories in adult [E66.09] 04/06/2019    Chest pain due to myocardial ischemia [I25.9] 04/06/2019    Chest pain [R07.9] 04/05/2019    Elevated lipase [R74.8] 04/05/2019    Accelerated hypertension [I10] 04/05/2019    Uncontrolled type 2 diabetes mellitus with hyperglycemia ([facility] [E11.65] 04/05/2019    Incomplete right bundle branch block [I45.10] 04/05/2019    Headache [R51] 04/05/2019    Obesity (BMI 30-39. 9) [E66.9] 04/05/2019       The patient was seen and examined on day of discharge and this discharge summary is in conjunction with any daily progress note from day of discharge. Admission HPI: 81 y.o. male who presented to [facility] with past medical history of diabetes, hypertension, aortic aneurysm, hypothyroidism and obesity. Patient started having chest pain around 3 PM, pain is in the central part of the chest, he attributed this initially to the bologna sandwich that he had, described as heaviness, 5 out of 10, radiated into the right arm, worsened with exertion and relieved by resting and nitroglycerin, associated with dizziness and near syncopal episode, shortness of breath and elevated blood pressure. He denies any vomiting or diaphoresis. He has been having left arm pain on and off for about 6 months as well as dyspnea on moderate exertion. Recently had cough and sinus infection, he denies any fever. No leg swelling, change in bowel habits or urinary complaints.  Patient had stress test, 2-D echo and [facility], he said the stress test was normal and his echo showed \"valve narrowing\".    Vitals notable for blood pressure of 240/90, labs shows AST 42, lipase 112, glucose 252, negative troponin. Chest x-ray shows nonspecific bibasilar airspace disease, thoracic aorta calcification, CTA of the chest shows coronary artery calcifications but no PE or dissections. EKG shows normal sinus rhythm rate of 86 with incomplete right bundle branch block. Hospital Course:   Mr. Redd Castano, a 81y.o. year old male  who  has a past medical history of Arthritis, Diabetes mellitus ([address] Hypercholesteremia, Hypertension, Squamous cell carcinoma, and Thyroid disease. During the course the patient's hospital stay Patient admitted on 4/5/2019 for CP. Patient seen by cardiology and gastroenterology for increased lipase. Cardiac catheterization was performed via cardiology. He was started on DAPT. He did not have any chest or abdominal pain. Stable at time of discharge to home. Cardiac cath   Left main: 0% stenosis  LAD: 0 %   Stenosis  Diagonal: Ostial to proximal 95% diffuse stenosis. Circumflex: 0%  0 stenosis. RCA: Dominant. 0 % 0 stenosis. LV angio: 65%  ejection fraction          Consults:     IP CONSULT TO HOSPITALIST  IP CONSULT TO CARDIOLOGY  IP CONSULT TO GI    Significant Diagnostic Studies:  As above      Discharge Instructions/Follow-up:  As above       Activity: activity as tolerated    Physical Exam:  Vitals:    04/09/19 0458   BP: (!) 145/73   Pulse: 63   Resp:    Temp: 98.1 °F (36.7 °C)   SpO2:        General appearance: No apparent distress, appears stated age and cooperative. HEENT: Pupils equal, round, and reactive to light. Conjunctivae/corneas clear. Neck: Supple. No jugular venous distention. Trachea midline. Respiratory:  Normal respiratory effort. Clear to auscultation, bilaterally without Rales/Wheezes/Rhonchi. Cardiovascular: Regular rate and rhythm with normal S1/S2 without murmurs, rubs or gallops.   Abdomen: Soft, non-tender, non-distended with normal bowel sounds. Musculoskeletal: No clubbing, cyanosis or edema bilaterally. Brisk capillary refill. 2+ lower extremity pulses (dorsalis pedis). Skin:  No rashes    Neurologic: awake, alert and following commands         Labs: For convenience and continuity at follow-up the following most recent labs are provided:      CBC:    Lab Results   Component Value Date    WBC 5.7 04/06/2019    HGB 14.0 04/06/2019    HCT 41.8 04/06/2019     04/06/2019       Renal:    Lab Results   Component Value Date     04/09/2019    K 3.8 04/09/2019    K 4.2 04/07/2019     04/09/2019    CO2 22 04/09/2019    BUN 18 04/09/2019    CREATININE 1.1 04/09/2019    CALCIUM 9.1 04/09/2019       Imaging:  US GALLBLADDER RUQ   Final Result      No cholelithiasis or evidence of acute cholecystitis. CTA CHEST W CONTRAST   Final Result   There is no thoracic aortic aneurysm, aortic dissection or central   pulmonary embolism. There is coronary artery calcifications. XR CHEST PORTABLE   Final Result   1. Vascular calcifications thoracic aorta. 2. Nonspecific bibasilar airspace disease, findings can be seen   infiltrate/pneumonia and/or atelectasis. Discharge Medications:     Current Discharge Medication List           Details   levothyroxine (SYNTHROID) 25 MCG tablet Take 25 mcg by mouth Daily      ketotifen (KP KETOTIFEN FUMARATE) 0.025 % ophthalmic solution Place 1 drop into both eyes 2 times daily      tamsulosin (FLOMAX) 0.4 MG capsule Take 0.8 mg by mouth daily      ezetimibe (ZETIA) 10 MG tablet Take 10 mg by mouth daily Patient can not take regular statins      GLIPIZIDE PO Take 10 mg by mouth daily       metFORMIN (GLUCOPHAGE) 1000 MG tablet Take 1,000 mg by mouth Daily with supper       hydrochlorothiazide (MICROZIDE) 12.5 MG capsule Take 12.5 mg by mouth daily.       LISINOPRIL PO Take 20 mg by mouth              Time Spent on discharge is more than 31 min in the examination, evaluation,

## 2019-04-09 NOTE — PROGRESS NOTES
CLINICAL PHARMACY NOTE: MEDS TO [address] Select Patient?: Yes  Total # of Prescriptions Filled: 1   The following medications were delivered to the patient:  · BRILINTA 90 MG   Total # of Interventions Completed: 3  Time Spent (min): 15    Additional Documentation:

## 2019-04-11 ENCOUNTER — TELEPHONE (OUTPATIENT)
Dept: CARDIOLOGY CLINIC | Age: 69
End: 2019-04-11

## 2019-05-14 ENCOUNTER — HOSPITAL ENCOUNTER (EMERGENCY)
Age: 69
Discharge: HOME | End: 2019-05-14
Attending: EMERGENCY MEDICINE
Payer: MEDICARE

## 2019-05-14 ENCOUNTER — APPOINTMENT (OUTPATIENT)
Dept: GENERAL RADIOLOGY | Age: 69
End: 2019-05-14
Payer: MEDICARE

## 2019-05-14 VITALS
TEMPERATURE: 97.8 F | HEART RATE: 56 BPM | BODY MASS INDEX: 30.42 KG/M2 | OXYGEN SATURATION: 99 % | WEIGHT: 206 LBS | RESPIRATION RATE: 17 BRPM | SYSTOLIC BLOOD PRESSURE: 187 MMHG | DIASTOLIC BLOOD PRESSURE: 88 MMHG

## 2019-05-14 DIAGNOSIS — M79.602 LEFT ARM PAIN: Primary | ICD-10-CM

## 2019-05-14 LAB
ANION GAP SERPL CALCULATED.3IONS-SCNC: 10 MMOL/L (ref 7–16)
APTT: 29.8 SEC (ref 24.5–35.1)
BASOPHILS ABSOLUTE: 0.03 E9/L (ref 0–0.2)
BASOPHILS RELATIVE PERCENT: 0.4 % (ref 0–2)
BUN BLDV-MCNC: 17 MG/DL (ref 8–23)
CALCIUM SERPL-MCNC: 9.1 MG/DL (ref 8.6–10.2)
CHLORIDE BLD-SCNC: 105 MMOL/L (ref 98–107)
CO2: 26 MMOL/L (ref 22–29)
CREAT SERPL-MCNC: 1.1 MG/DL (ref 0.7–1.2)
EKG ATRIAL RATE: 49 BPM
EKG P AXIS: 37 DEGREES
EKG P-R INTERVAL: 174 MS
EKG Q-T INTERVAL: 432 MS
EKG QRS DURATION: 108 MS
EKG QTC CALCULATION (BAZETT): 390 MS
EKG R AXIS: -30 DEGREES
EKG T AXIS: 52 DEGREES
EKG VENTRICULAR RATE: 49 BPM
EOSINOPHILS ABSOLUTE: 0.16 E9/L (ref 0.05–0.5)
EOSINOPHILS RELATIVE PERCENT: 2.3 % (ref 0–6)
GFR AFRICAN AMERICAN: >60
GFR NON-AFRICAN AMERICAN: >60 ML/MIN/1.73
GLUCOSE BLD-MCNC: 129 MG/DL (ref 74–99)
HCT VFR BLD CALC: 45 % (ref 37–54)
HEMOGLOBIN: 15.3 G/DL (ref 12.5–16.5)
IMMATURE GRANULOCYTES #: 0.02 E9/L
IMMATURE GRANULOCYTES %: 0.3 % (ref 0–5)
INR BLD: 1.1
LYMPHOCYTES ABSOLUTE: 1.46 E9/L (ref 1.5–4)
LYMPHOCYTES RELATIVE PERCENT: 20.6 % (ref 20–42)
MCH RBC QN AUTO: 28.8 PG (ref 26–35)
MCHC RBC AUTO-ENTMCNC: 34 % (ref 32–34.5)
MCV RBC AUTO: 84.6 FL (ref 80–99.9)
MONOCYTES ABSOLUTE: 0.57 E9/L (ref 0.1–0.95)
MONOCYTES RELATIVE PERCENT: 8 % (ref 2–12)
NEUTROPHILS ABSOLUTE: 4.85 E9/L (ref 1.8–7.3)
NEUTROPHILS RELATIVE PERCENT: 68.4 % (ref 43–80)
PDW BLD-RTO: 13.4 FL (ref 11.5–15)
PLATELET # BLD: 229 E9/L (ref 130–450)
PMV BLD AUTO: 10 FL (ref 7–12)
POTASSIUM REFLEX MAGNESIUM: 4 MMOL/L (ref 3.5–5)
PROTHROMBIN TIME: 12.5 SEC (ref 9.3–12.4)
RBC # BLD: 5.32 E12/L (ref 3.8–5.8)
SODIUM BLD-SCNC: 141 MMOL/L (ref 132–146)
TROPONIN: <0.01 NG/ML (ref 0–0.03)
TROPONIN: <0.01 NG/ML (ref 0–0.03)
WBC # BLD: 7.1 E9/L (ref 4.5–11.5)

## 2019-05-14 PROCEDURE — 73030 X-RAY EXAM OF SHOULDER: CPT

## 2019-05-14 PROCEDURE — 85610 PROTHROMBIN TIME: CPT

## 2019-05-14 PROCEDURE — 36415 COLL VENOUS BLD VENIPUNCTURE: CPT

## 2019-05-14 PROCEDURE — 85025 COMPLETE CBC W/AUTO DIFF WBC: CPT

## 2019-05-14 PROCEDURE — 85730 THROMBOPLASTIN TIME PARTIAL: CPT

## 2019-05-14 PROCEDURE — 93005 ELECTROCARDIOGRAM TRACING: CPT | Performed by: EMERGENCY MEDICINE

## 2019-05-14 PROCEDURE — 6360000002 HC RX W HCPCS: Performed by: EMERGENCY MEDICINE

## 2019-05-14 PROCEDURE — 99284 EMERGENCY DEPT VISIT MOD MDM: CPT

## 2019-05-14 PROCEDURE — 80048 BASIC METABOLIC PNL TOTAL CA: CPT

## 2019-05-14 PROCEDURE — 6370000000 HC RX 637 (ALT 250 FOR IP): Performed by: EMERGENCY MEDICINE

## 2019-05-14 PROCEDURE — 71046 X-RAY EXAM CHEST 2 VIEWS: CPT

## 2019-05-14 PROCEDURE — 73080 X-RAY EXAM OF ELBOW: CPT

## 2019-05-14 PROCEDURE — 93010 ELECTROCARDIOGRAM REPORT: CPT | Performed by: INTERNAL MEDICINE

## 2019-05-14 PROCEDURE — 84484 ASSAY OF TROPONIN QUANT: CPT

## 2019-05-14 PROCEDURE — 96374 THER/PROPH/DIAG INJ IV PUSH: CPT

## 2019-05-14 RX ORDER — NAPROXEN 500 MG/1
500 TABLET ORAL 2 TIMES DAILY PRN
Qty: 60 TABLET | Refills: 0 | Status: SHIPPED | OUTPATIENT
Start: 2019-05-14 | End: 2019-09-01

## 2019-05-14 RX ORDER — ASPIRIN 81 MG/1
324 TABLET, CHEWABLE ORAL ONCE
Status: COMPLETED | OUTPATIENT
Start: 2019-05-14 | End: 2019-05-14

## 2019-05-14 RX ORDER — KETOROLAC TROMETHAMINE 30 MG/ML
15 INJECTION, SOLUTION INTRAMUSCULAR; INTRAVENOUS ONCE
Status: COMPLETED | OUTPATIENT
Start: 2019-05-14 | End: 2019-05-14

## 2019-05-14 RX ADMIN — ASPIRIN 243 MG: 81 TABLET, CHEWABLE ORAL at 13:17

## 2019-05-14 RX ADMIN — NITROGLYCERIN 0.5 INCH: 20 OINTMENT TOPICAL at 13:24

## 2019-05-14 RX ADMIN — KETOROLAC TROMETHAMINE 15 MG: 30 INJECTION INTRAMUSCULAR; INTRAVENOUS at 16:23

## 2019-05-14 ASSESSMENT — PAIN SCALES - GENERAL: PAINLEVEL_OUTOF10: 9

## 2019-05-14 NOTE — ED PROVIDER NOTES
Eosinophils % 2.3 0.0 - 6.0 %    Basophils % 0.4 0.0 - 2.0 %    Neutrophils # 4.85 1.80 - 7.30 E9/L    Immature Granulocytes # 0.02 E9/L    Lymphocytes # 1.46 (L) 1.50 - 4.00 E9/L    Monocytes # 0.57 0.10 - 0.95 E9/L    Eosinophils # 0.16 0.05 - 0.50 E9/L    Basophils # 0.03 0.00 - 0.20 E9/O   Basic Metabolic Panel w/ Reflex to MG   Result Value Ref Range    Sodium 141 132 - 146 mmol/L    Potassium reflex Magnesium 4.0 3.5 - 5.0 mmol/L    Chloride 105 98 - 107 mmol/L    CO2 26 22 - 29 mmol/L    Anion Gap 10 7 - 16 mmol/L    Glucose 129 (H) 74 - 99 mg/dL    BUN 17 8 - 23 mg/dL    CREATININE 1.1 0.7 - 1.2 mg/dL    GFR Non-African American >60 >=60 mL/min/1.73    GFR African American >60     Calcium 9.1 8.6 - 10.2 mg/dL   Troponin   Result Value Ref Range    Troponin <0.01 0.00 - 0.03 ng/mL   Protime-INR   Result Value Ref Range    Protime 12.5 (H) 9.3 - 12.4 sec    INR 1.1    APTT   Result Value Ref Range    aPTT 29.8 24.5 - 35.1 sec   EKG 12 Lead   Result Value Ref Range    Ventricular Rate 49 BPM    Atrial Rate 49 BPM    P-R Interval 174 ms    QRS Duration 108 ms    Q-T Interval 432 ms    QTc Calculation (Bazett) 390 ms    P Axis 37 degrees    R Axis -30 degrees    T Axis 52 degrees       RADIOLOGY:  Interpreted by Radiologist.  XR SHOULDER LEFT (MIN 2 VIEWS)   Final Result   1. No acute fracture definitively identified. If there is persistent   clinical pain or symptomatology, a return to medical attention within   2-7 days and further imaging is recommended. .   2. Moderate left acromioclavicular osteoarthritis. 3. Mild left glenohumeral osteoarthritis with suspected   intra-articular loose body measured at approximately 1.5 cm. XR ELBOW LEFT (MIN 3 VIEWS)   Final Result   No evidence of acute skeletal pathology or joint abnormality of the   left elbow. There is some subjective soft tissue swelling of the proximal left   forearm. XR CHEST STANDARD (2 VW)   Final Result   1.  Nonspecific bibasilar in ed    Re-Evaluations:           Time 2:38 PM  Discussed results. Upon re-examination, patients symptoms show no change. Consultations:               Counseling: The emergency provider has spoken with the patient and discussed todays results, in addition to providing specific details for the plan of care and counseling regarding the diagnosis and prognosis. Questions are answered at this time and they are agreeable with the plan.       --------------------------------- IMPRESSION AND DISPOSITION ---------------------------------    IMPRESSION  No diagnosis found. DISPOSITION  Disposition: Discharge to home  Patient condition is stable    SCRIBE ATTESTATION  5/14/19, 1:05 PM.    This note is prepared by [place] acting as Scribe for Jolly Dixon MD.    Jolly Dixon MD:  The scribe's documentation has been prepared under my direction and personally reviewed by me in its entirety. I confirm that the note above accurately reflects all work, treatment, procedures, and medical decision making performed by me.              Jolly Dixon MD  05/14/19 0186

## 2019-05-29 ENCOUNTER — OFFICE VISIT (OUTPATIENT)
Dept: CARDIOLOGY CLINIC | Age: 69
End: 2019-05-29
Payer: MEDICARE

## 2019-05-29 VITALS
SYSTOLIC BLOOD PRESSURE: 138 MMHG | DIASTOLIC BLOOD PRESSURE: 78 MMHG | BODY MASS INDEX: 30.75 KG/M2 | RESPIRATION RATE: 16 BRPM | WEIGHT: 207.6 LBS | HEIGHT: 69 IN | HEART RATE: 53 BPM

## 2019-05-29 DIAGNOSIS — I25.10 CORONARY ARTERY DISEASE DUE TO LIPID RICH PLAQUE: ICD-10-CM

## 2019-05-29 DIAGNOSIS — I10 ACCELERATED HYPERTENSION: Primary | ICD-10-CM

## 2019-05-29 DIAGNOSIS — I25.83 CORONARY ARTERY DISEASE DUE TO LIPID RICH PLAQUE: ICD-10-CM

## 2019-05-29 PROCEDURE — 1036F TOBACCO NON-USER: CPT | Performed by: INTERNAL MEDICINE

## 2019-05-29 PROCEDURE — G8598 ASA/ANTIPLAT THER USED: HCPCS | Performed by: INTERNAL MEDICINE

## 2019-05-29 PROCEDURE — G8427 DOCREV CUR MEDS BY ELIG CLIN: HCPCS | Performed by: INTERNAL MEDICINE

## 2019-05-29 PROCEDURE — 1123F ACP DISCUSS/DSCN MKR DOCD: CPT | Performed by: INTERNAL MEDICINE

## 2019-05-29 PROCEDURE — 3017F COLORECTAL CA SCREEN DOC REV: CPT | Performed by: INTERNAL MEDICINE

## 2019-05-29 PROCEDURE — 4040F PNEUMOC VAC/ADMIN/RCVD: CPT | Performed by: INTERNAL MEDICINE

## 2019-05-29 PROCEDURE — 99214 OFFICE O/P EST MOD 30 MIN: CPT | Performed by: INTERNAL MEDICINE

## 2019-05-29 PROCEDURE — 93000 ELECTROCARDIOGRAM COMPLETE: CPT | Performed by: INTERNAL MEDICINE

## 2019-05-29 PROCEDURE — G8417 CALC BMI ABV UP PARAM F/U: HCPCS | Performed by: INTERNAL MEDICINE

## 2019-05-29 RX ORDER — CHLORTHALIDONE 25 MG/1
25 TABLET ORAL DAILY
COMMUNITY
End: 2019-09-01

## 2019-05-29 RX ORDER — PRASUGREL 10 MG/1
10 TABLET, FILM COATED ORAL DAILY
Qty: 90 TABLET | Refills: 3 | Status: SHIPPED
Start: 2019-05-29 | End: 2020-06-22

## 2019-05-29 RX ORDER — GABAPENTIN 300 MG/1
300 CAPSULE ORAL 3 TIMES DAILY
COMMUNITY
End: 2019-09-01

## 2019-05-29 NOTE — PROGRESS NOTES
Trung Ahuja  1950  Date of Service: 5/29/2019    Patient Active Problem List    Diagnosis Date Noted    Coronary artery disease due to lipid rich plaque 05/29/2019     Overview Note:     4/8/19: 2.25 x 15 and 2.25 x 8 mm overlapping and sequential drug-eluting stents to the diagonal      Unstable angina ([facility] [address] 04/09/2019    Class 1 obesity due to excess calories in adult 04/06/2019    Chest pain due to myocardial ischemia 04/06/2019    Chest pain 04/05/2019    Elevated lipase 04/05/2019    Accelerated hypertension 04/05/2019    Uncontrolled type 2 diabetes mellitus with hyperglycemia ([facility] [address] 04/05/2019    Incomplete right bundle branch block 04/05/2019    Headache 04/05/2019    Obesity (BMI 30-39.9) 04/05/2019       Social History     Socioeconomic History    Marital status:      Spouse name: Justin Pinto Number of children: 4    Years of education: 10    Highest education level: None   Occupational History    Occupation: Director     Comment: [employer] - retired   Social Needs    Financial resource strain: None    Food insecurity:     Worry: None     Inability: None    Transportation needs:     Medical: None     Non-medical: None   Tobacco Use    Smoking status: Former Smoker    Smokeless tobacco: Former User     Quit date: 10/10/1988    Tobacco comment: Quit years ago when in [facility]   Substance and Sexual Activity    Alcohol use: No    Drug use: No    Sexual activity: Yes     Partners: Female   Lifestyle    Physical activity:     Days per week: None     Minutes per session: None    Stress: None   Relationships    Social connections:     Talks on phone: None     Gets together: None     Attends Buddhism service: None     Active member of club or organization: None     Attends meetings of clubs or organizations: None     Relationship status: None    Intimate partner violence:     Fear of current or ex partner: None     Emotionally abused: None     Physically abused: None     Forced sexual activity: None   Other Topics Concern    None   Social History Narrative    None       Current Outpatient Medications   Medication Sig Dispense Refill    chlorthalidone (HYGROTON) 25 MG tablet Take 25 mg by mouth daily      gabapentin (NEURONTIN) 300 MG capsule Take 300 mg by mouth 3 times daily.  prasugrel (EFFIENT) 10 MG TABS Take 1 tablet by mouth daily 90 tablet 3    naproxen (NAPROXEN) 500 MG EC tablet Take 1 tablet by mouth 2 times daily as needed for Pain 60 tablet 0    aspirin 81 MG EC tablet Take 1 tablet by mouth daily 30 tablet 0    nitroGLYCERIN (NITROSTAT) 0.4 MG SL tablet up to max of 3 total doses. If no relief after 1 dose, call 911. 25 tablet 0    metoprolol succinate (TOPROL XL) 25 MG extended release tablet Take 0.5 tablets by mouth daily 30 tablet 0    amLODIPine (NORVASC) 10 MG tablet Take 1 tablet by mouth daily 30 tablet 0    lisinopril (PRINIVIL;ZESTRIL) 40 MG tablet Take 1 tablet by mouth daily 30 tablet 0    levothyroxine (SYNTHROID) 25 MCG tablet Take 25 mcg by mouth Daily      ketotifen (KP KETOTIFEN FUMARATE) 0.025 % ophthalmic solution Place 1 drop into both eyes 2 times daily      tamsulosin (FLOMAX) 0.4 MG capsule Take 0.8 mg by mouth daily      ezetimibe (ZETIA) 10 MG tablet Take 10 mg by mouth daily Patient can not take regular statins      GLIPIZIDE PO Take 10 mg by mouth daily       metFORMIN (GLUCOPHAGE) 1000 MG tablet Take 1,000 mg by mouth Daily with supper       hydrochlorothiazide (HYDRODIURIL) 25 MG tablet Take 1 tablet by mouth daily 30 tablet 0     No current facility-administered medications for this visit.          Allergies   Allergen Reactions    Statins      States he is intolerant        Chief Complaint:  Lukas Hagan is here today for follow up and management/recomendations for CAD     History of Present Illness: Lukas Hagan is being seen today for initial hospital follow-up. states that He does house work, goes up the stairs, does yard work & goes shopping. He denies any chest discomfort, dyspnea on exertion, orthopnea/PND, or lower extremity edema. He denies any palpitations or presyncopal symptoms. However, he states that he did have shortness of breath with Brilinta. He states that this is slowly improving but that he still feels short of breath or as if he has a catch when he tries to take a deep inspiration after taking his evening dose of Brilinta especially with lying down. REVIEW OF SYSTEMS:  As above. Patient does not complain of any fever, chills, nausea, vomiting or diarrhea. No focal, motor or neurological deficits. No changes in his/her vision, hearing, bowel or bladder habits. He is not known to have a history of thyroid problems. No recent nose bleeds. PHYSICAL EXAM:  Vitals:    05/29/19 0908   BP: 138/78   Pulse: 53   Resp: 16   Weight: 207 lb 9.6 oz (94.2 kg)   Height: 5' 9\" (1.753 m)       GENERAL:  He is alert and oriented x 3, communicates well, in no distress. NECK:  No masses, trachea is mid position. Supple, full ROM, no JVD or bruits. No palpable thyromegaly or lymphadenopathy. HEART:  Regular rate and rhythm. Normal S1 and S2. There is an S4 gallop and a I/VI (normal physiologic) systolic murmur. LUNGS:  Clear to auscultation bilaterally. No use of accessory muscles. symmetrical excursion. ABDOMEN:  Soft, non-tender. Normal bowel sounds. Obese. EXTREMITIES:  Full ROM x 4. No bilateral lower extremity edema. Good distal pulses. EYES:  Extraocular muscles intact. PERRL. Normal lids & conjunctiva. ENT:  Nares are clear & not bleeding. Moist mucosa. Normal lips formation. No external masses   NEURO: no tremors, full ROM x 4, EOMI. SKIN:  Warm, dry and intact. Normal turgor. EKG: Sinus rhythm, 53 bpm, left axis, nonspecific ST - T wave changes. Assessment:   1. Coronary artery disease as outlined above.   Clinically no symptoms of recurring ischemia at this time.  2. Hypertension, well controled at this time. 3. Hypercholesterolemia  4. Shortness of breath with Brilinta as described above. Recommendations:  1. Continue to follow the cholesterol with Dr. Live Nicely. 2. I will change the Brilinta to Effient. Thank you for allowing me to participate in your patient's care.       [address], [address] [facility]  Interventional Cardiology

## 2019-09-01 ENCOUNTER — HOSPITAL ENCOUNTER (EMERGENCY)
Age: 69
Discharge: HOME | End: 2019-09-01
Payer: MEDICARE

## 2019-09-01 VITALS
DIASTOLIC BLOOD PRESSURE: 74 MMHG | SYSTOLIC BLOOD PRESSURE: 160 MMHG | HEIGHT: 69 IN | BODY MASS INDEX: 30.36 KG/M2 | TEMPERATURE: 98.3 F | OXYGEN SATURATION: 97 % | WEIGHT: 205 LBS | RESPIRATION RATE: 16 BRPM | HEART RATE: 58 BPM

## 2019-09-01 DIAGNOSIS — S61.012A LACERATION OF LEFT THUMB WITHOUT FOREIGN BODY WITHOUT DAMAGE TO NAIL, INITIAL ENCOUNTER: Primary | ICD-10-CM

## 2019-09-01 DIAGNOSIS — Z23 NEED FOR TDAP VACCINATION: ICD-10-CM

## 2019-09-01 PROCEDURE — 90471 IMMUNIZATION ADMIN: CPT | Performed by: NURSE PRACTITIONER

## 2019-09-01 PROCEDURE — 6360000002 HC RX W HCPCS: Performed by: NURSE PRACTITIONER

## 2019-09-01 PROCEDURE — 2500000003 HC RX 250 WO HCPCS: Performed by: NURSE PRACTITIONER

## 2019-09-01 PROCEDURE — 99282 EMERGENCY DEPT VISIT SF MDM: CPT

## 2019-09-01 PROCEDURE — 90715 TDAP VACCINE 7 YRS/> IM: CPT | Performed by: NURSE PRACTITIONER

## 2019-09-01 PROCEDURE — 6370000000 HC RX 637 (ALT 250 FOR IP): Performed by: NURSE PRACTITIONER

## 2019-09-01 RX ORDER — DIAPER,BRIEF,INFANT-TODD,DISP
EACH MISCELLANEOUS ONCE
Status: COMPLETED | OUTPATIENT
Start: 2019-09-01 | End: 2019-09-01

## 2019-09-01 RX ORDER — GLUCOSAMINE/CHONDR SU A SOD 750-600 MG
TABLET ORAL DAILY
COMMUNITY

## 2019-09-01 RX ORDER — LIDOCAINE HYDROCHLORIDE 10 MG/ML
20 INJECTION, SOLUTION INFILTRATION; PERINEURAL ONCE
Status: COMPLETED | OUTPATIENT
Start: 2019-09-01 | End: 2019-09-01

## 2019-09-01 RX ADMIN — BACITRACIN ZINC: 500 OINTMENT TOPICAL at 19:41

## 2019-09-01 RX ADMIN — TETANUS TOXOID, REDUCED DIPHTHERIA TOXOID AND ACELLULAR PERTUSSIS VACCINE, ADSORBED 0.5 ML: 5; 2.5; 8; 8; 2.5 SUSPENSION INTRAMUSCULAR at 19:40

## 2019-09-01 RX ADMIN — LIDOCAINE HYDROCHLORIDE 20 ML: 10 INJECTION, SOLUTION INFILTRATION; PERINEURAL at 19:41

## 2019-09-01 ASSESSMENT — PAIN DESCRIPTION - PROGRESSION: CLINICAL_PROGRESSION: NOT CHANGED

## 2019-09-01 ASSESSMENT — PAIN SCALES - GENERAL: PAINLEVEL_OUTOF10: 1

## 2019-09-01 ASSESSMENT — PAIN DESCRIPTION - ORIENTATION: ORIENTATION: LEFT

## 2019-09-01 ASSESSMENT — PAIN DESCRIPTION - FREQUENCY: FREQUENCY: CONTINUOUS

## 2019-09-01 ASSESSMENT — PAIN DESCRIPTION - PAIN TYPE: TYPE: ACUTE PAIN

## 2019-09-01 ASSESSMENT — PAIN DESCRIPTION - DESCRIPTORS: DESCRIPTORS: CONSTANT;DISCOMFORT;THROBBING

## 2019-09-01 ASSESSMENT — PAIN DESCRIPTION - ONSET: ONSET: SUDDEN

## 2019-09-01 ASSESSMENT — PAIN DESCRIPTION - LOCATION: LOCATION: FINGER (COMMENT WHICH ONE)

## 2019-09-01 NOTE — ED PROVIDER NOTES
(36.8 °C) Oral 58 16 97 % 5' 9\" (1.753 m) 205 lb (93 kg)      Oxygen Saturation Interpretation: Normal.    Constitutional:  Alert, development consistent with age. HEENT:  NC/NT. Airway patent. Neck:  Normal ROM. Supple. Non-tender. Digits:   Left Thumb medial aspect just below the interphalangeal joint. Tenderness:  none. .              Swelling: None. Deformity: no.             ROM: full range of motion. Skin:  4 cm laceration dermis. Neurovascular: Motor deficit: none. Hand grasp intact good flexion extension of the thumb against opposition. Sensory deficit: none; sensation intact to light touch in distal fingers. Pulse deficit: none; 2+ radial pulse intact. Capillary refill: normal; < than 3 seconds in distal thumb. Hand:              Tenderness:  none. Swelling: None. Deformity: no.             Skin:  no erythema, rash or wounds noted. Lymphatics: No lymphangitis or adenopathy noted. Neurological:  Oriented. Motor functions intact. Physical Exam   Musculoskeletal:        Hands:      Lab / Imaging Results   (All laboratory and radiology results have been personally reviewed by myself)  Labs:  No results found for this visit on 09/01/19. Imaging: All Radiology results interpreted by Radiologist unless otherwise noted. No orders to display     ED Course / Medical Decision Making     Medications   lidocaine 1 % injection 20 mL (20 mLs Intradermal Given 9/1/19 1941)   bacitracin zinc ointment ( Topical Given 9/1/19 1941)   Tetanus-Diphth-Acell Pertussis (BOOSTRIX) injection 0.5 mL (0.5 mLs Intramuscular Given 9/1/19 1940)        Consult(s):   None    Procedure(s):     PROCEDURE NOTE  9/1/19       Time: 2032    LACERATION REPAIR  Risks, benefits and alternatives (for applicable procedures below) described. Performed By: NAEEM Mendoza - CNP. Laceration #: 1.   Location:

## 2019-10-08 ENCOUNTER — TELEPHONE (OUTPATIENT)
Dept: ADMINISTRATIVE | Age: 69
End: 2019-10-08

## 2019-10-31 ENCOUNTER — OFFICE VISIT (OUTPATIENT)
Dept: CARDIOLOGY CLINIC | Age: 69
End: 2019-10-31
Payer: MEDICARE

## 2019-10-31 VITALS
RESPIRATION RATE: 14 BRPM | HEART RATE: 52 BPM | SYSTOLIC BLOOD PRESSURE: 140 MMHG | HEIGHT: 69 IN | DIASTOLIC BLOOD PRESSURE: 70 MMHG | WEIGHT: 209 LBS | BODY MASS INDEX: 30.96 KG/M2

## 2019-10-31 DIAGNOSIS — I10 ACCELERATED HYPERTENSION: Primary | ICD-10-CM

## 2019-10-31 PROCEDURE — 99214 OFFICE O/P EST MOD 30 MIN: CPT | Performed by: INTERNAL MEDICINE

## 2019-10-31 PROCEDURE — 93000 ELECTROCARDIOGRAM COMPLETE: CPT | Performed by: INTERNAL MEDICINE

## 2019-10-31 PROCEDURE — G8417 CALC BMI ABV UP PARAM F/U: HCPCS | Performed by: INTERNAL MEDICINE

## 2019-10-31 PROCEDURE — 4040F PNEUMOC VAC/ADMIN/RCVD: CPT | Performed by: INTERNAL MEDICINE

## 2019-10-31 PROCEDURE — G8484 FLU IMMUNIZE NO ADMIN: HCPCS | Performed by: INTERNAL MEDICINE

## 2019-10-31 PROCEDURE — G8427 DOCREV CUR MEDS BY ELIG CLIN: HCPCS | Performed by: INTERNAL MEDICINE

## 2019-10-31 PROCEDURE — G8598 ASA/ANTIPLAT THER USED: HCPCS | Performed by: INTERNAL MEDICINE

## 2019-10-31 PROCEDURE — 3017F COLORECTAL CA SCREEN DOC REV: CPT | Performed by: INTERNAL MEDICINE

## 2019-10-31 PROCEDURE — 1123F ACP DISCUSS/DSCN MKR DOCD: CPT | Performed by: INTERNAL MEDICINE

## 2019-10-31 PROCEDURE — 1036F TOBACCO NON-USER: CPT | Performed by: INTERNAL MEDICINE

## 2019-10-31 RX ORDER — HYDRALAZINE HYDROCHLORIDE 25 MG/1
25 TABLET, FILM COATED ORAL 3 TIMES DAILY
Qty: 270 TABLET | Refills: 3 | Status: SHIPPED | OUTPATIENT
Start: 2019-10-31 | End: 2019-10-31 | Stop reason: SDUPTHER

## 2019-10-31 RX ORDER — HYDRALAZINE HYDROCHLORIDE 25 MG/1
25 TABLET, FILM COATED ORAL 3 TIMES DAILY
Qty: 270 TABLET | Refills: 3 | Status: ON HOLD | OUTPATIENT
Start: 2019-10-31 | End: 2021-09-28

## 2020-06-15 ENCOUNTER — TELEPHONE (OUTPATIENT)
Dept: ADMINISTRATIVE | Age: 70
End: 2020-06-15

## 2020-06-16 ENCOUNTER — TELEPHONE (OUTPATIENT)
Dept: CARDIOLOGY CLINIC | Age: 70
End: 2020-06-16

## 2020-06-19 NOTE — TELEPHONE ENCOUNTER
He is 1 year out from too long narrow overlapping stents. I would continue for another year until I see him again. If he wants it is okay to decrease to Plavix which is a little less potent with a little less bleeding. Plavix would be 75 mg daily.

## 2020-06-22 RX ORDER — CLOPIDOGREL BISULFATE 75 MG/1
75 TABLET ORAL DAILY
COMMUNITY
End: 2020-06-22 | Stop reason: SDUPTHER

## 2020-06-22 RX ORDER — CLOPIDOGREL BISULFATE 75 MG/1
75 TABLET ORAL DAILY
Qty: 90 TABLET | Refills: 3 | Status: ON HOLD | OUTPATIENT
Start: 2020-06-22 | End: 2021-09-28

## 2021-02-03 ENCOUNTER — HOSPITAL ENCOUNTER (OUTPATIENT)
Dept: CT IMAGING | Age: 71
Discharge: HOME | End: 2021-02-05
Payer: OTHER GOVERNMENT

## 2021-02-03 DIAGNOSIS — R91.8 ABNORMAL FINDING ON LUNG IMAGING: ICD-10-CM

## 2021-02-03 PROCEDURE — 71250 CT THORAX DX C-: CPT

## 2021-04-29 ENCOUNTER — HOSPITAL ENCOUNTER (OUTPATIENT)
Dept: CT IMAGING | Age: 71
Discharge: HOME | End: 2021-05-01
Payer: OTHER GOVERNMENT

## 2021-04-29 DIAGNOSIS — R91.8 MULTIPLE LUNG NODULES: ICD-10-CM

## 2021-04-29 PROCEDURE — 71250 CT THORAX DX C-: CPT

## 2021-06-01 ENCOUNTER — HOSPITAL ENCOUNTER (INPATIENT)
Age: 71
LOS: 4 days | Discharge: HOME | DRG: 419 | End: 2021-06-06
Attending: EMERGENCY MEDICINE | Admitting: INTERNAL MEDICINE
Payer: MEDICARE

## 2021-06-01 ENCOUNTER — APPOINTMENT (OUTPATIENT)
Dept: ULTRASOUND IMAGING | Age: 71
DRG: 419 | End: 2021-06-01
Payer: MEDICARE

## 2021-06-01 ENCOUNTER — APPOINTMENT (OUTPATIENT)
Dept: CT IMAGING | Age: 71
DRG: 419 | End: 2021-06-01
Payer: MEDICARE

## 2021-06-01 DIAGNOSIS — K82.8 GALLBLADDER SLUDGE: ICD-10-CM

## 2021-06-01 DIAGNOSIS — K81.0 GANGRENOUS CHOLECYSTITIS: ICD-10-CM

## 2021-06-01 DIAGNOSIS — R10.11 RIGHT UPPER QUADRANT ABDOMINAL PAIN: Primary | ICD-10-CM

## 2021-06-01 LAB
ALBUMIN SERPL-MCNC: 3.9 G/DL (ref 3.5–5.2)
ALP BLD-CCNC: 89 U/L (ref 40–129)
ALT SERPL-CCNC: 32 U/L (ref 0–40)
ANION GAP SERPL CALCULATED.3IONS-SCNC: 15 MMOL/L (ref 7–16)
AST SERPL-CCNC: 23 U/L (ref 0–39)
BASOPHILS ABSOLUTE: 0.03 E9/L (ref 0–0.2)
BASOPHILS RELATIVE PERCENT: 0.2 % (ref 0–2)
BILIRUB SERPL-MCNC: 0.7 MG/DL (ref 0–1.2)
BILIRUBIN DIRECT: 0.3 MG/DL (ref 0–0.3)
BILIRUBIN, INDIRECT: 0.4 MG/DL (ref 0–1)
BUN BLDV-MCNC: 23 MG/DL (ref 6–23)
CALCIUM SERPL-MCNC: 8.8 MG/DL (ref 8.6–10.2)
CHLORIDE BLD-SCNC: 101 MMOL/L (ref 98–107)
CO2: 22 MMOL/L (ref 22–29)
CREAT SERPL-MCNC: 1 MG/DL (ref 0.7–1.2)
EOSINOPHILS ABSOLUTE: 0.08 E9/L (ref 0.05–0.5)
EOSINOPHILS RELATIVE PERCENT: 0.6 % (ref 0–6)
GFR AFRICAN AMERICAN: >60
GFR NON-AFRICAN AMERICAN: >60 ML/MIN/1.73
GLUCOSE BLD-MCNC: 203 MG/DL (ref 74–99)
HCT VFR BLD CALC: 43.3 % (ref 37–54)
HEMOGLOBIN: 14.7 G/DL (ref 12.5–16.5)
IMMATURE GRANULOCYTES #: 0.11 E9/L
IMMATURE GRANULOCYTES %: 0.8 % (ref 0–5)
LACTIC ACID: 2.4 MMOL/L (ref 0.5–2.2)
LIPASE: 175 U/L (ref 13–60)
LYMPHOCYTES ABSOLUTE: 1.18 E9/L (ref 1.5–4)
LYMPHOCYTES RELATIVE PERCENT: 8.2 % (ref 20–42)
MCH RBC QN AUTO: 28.4 PG (ref 26–35)
MCHC RBC AUTO-ENTMCNC: 33.9 % (ref 32–34.5)
MCV RBC AUTO: 83.8 FL (ref 80–99.9)
MONOCYTES ABSOLUTE: 1.17 E9/L (ref 0.1–0.95)
MONOCYTES RELATIVE PERCENT: 8.1 % (ref 2–12)
NEUTROPHILS ABSOLUTE: 11.83 E9/L (ref 1.8–7.3)
NEUTROPHILS RELATIVE PERCENT: 82.1 % (ref 43–80)
PDW BLD-RTO: 13.2 FL (ref 11.5–15)
PLATELET # BLD: 300 E9/L (ref 130–450)
PMV BLD AUTO: 9.5 FL (ref 7–12)
POTASSIUM REFLEX MAGNESIUM: 3.9 MMOL/L (ref 3.5–5)
RBC # BLD: 5.17 E12/L (ref 3.8–5.8)
SODIUM BLD-SCNC: 138 MMOL/L (ref 132–146)
TOTAL PROTEIN: 7 G/DL (ref 6.4–8.3)
TROPONIN, HIGH SENSITIVITY: 10 NG/L (ref 0–11)
TROPONIN, HIGH SENSITIVITY: 11 NG/L (ref 0–11)
WBC # BLD: 14.4 E9/L (ref 4.5–11.5)

## 2021-06-01 PROCEDURE — 80076 HEPATIC FUNCTION PANEL: CPT

## 2021-06-01 PROCEDURE — 96374 THER/PROPH/DIAG INJ IV PUSH: CPT

## 2021-06-01 PROCEDURE — 84484 ASSAY OF TROPONIN QUANT: CPT

## 2021-06-01 PROCEDURE — 99285 EMERGENCY DEPT VISIT HI MDM: CPT

## 2021-06-01 PROCEDURE — 85025 COMPLETE CBC W/AUTO DIFF WBC: CPT

## 2021-06-01 PROCEDURE — 74177 CT ABD & PELVIS W/CONTRAST: CPT

## 2021-06-01 PROCEDURE — 96375 TX/PRO/DX INJ NEW DRUG ADDON: CPT

## 2021-06-01 PROCEDURE — 80048 BASIC METABOLIC PNL TOTAL CA: CPT

## 2021-06-01 PROCEDURE — 76705 ECHO EXAM OF ABDOMEN: CPT

## 2021-06-01 PROCEDURE — 83690 ASSAY OF LIPASE: CPT

## 2021-06-01 PROCEDURE — 93005 ELECTROCARDIOGRAM TRACING: CPT | Performed by: EMERGENCY MEDICINE

## 2021-06-01 PROCEDURE — 2580000003 HC RX 258: Performed by: EMERGENCY MEDICINE

## 2021-06-01 PROCEDURE — 83605 ASSAY OF LACTIC ACID: CPT

## 2021-06-01 PROCEDURE — 6360000002 HC RX W HCPCS: Performed by: EMERGENCY MEDICINE

## 2021-06-01 PROCEDURE — 6360000004 HC RX CONTRAST MEDICATION: Performed by: RADIOLOGY

## 2021-06-01 RX ORDER — 0.9 % SODIUM CHLORIDE 0.9 %
1000 INTRAVENOUS SOLUTION INTRAVENOUS ONCE
Status: COMPLETED | OUTPATIENT
Start: 2021-06-01 | End: 2021-06-01

## 2021-06-01 RX ORDER — MORPHINE SULFATE 4 MG/ML
4 INJECTION, SOLUTION INTRAMUSCULAR; INTRAVENOUS ONCE
Status: COMPLETED | OUTPATIENT
Start: 2021-06-01 | End: 2021-06-01

## 2021-06-01 RX ORDER — ONDANSETRON 2 MG/ML
4 INJECTION INTRAMUSCULAR; INTRAVENOUS ONCE
Status: COMPLETED | OUTPATIENT
Start: 2021-06-01 | End: 2021-06-01

## 2021-06-01 RX ADMIN — MORPHINE SULFATE 4 MG: 4 INJECTION, SOLUTION INTRAMUSCULAR; INTRAVENOUS at 20:28

## 2021-06-01 RX ADMIN — ONDANSETRON 4 MG: 2 INJECTION INTRAMUSCULAR; INTRAVENOUS at 20:28

## 2021-06-01 RX ADMIN — IOPAMIDOL 75 ML: 755 INJECTION, SOLUTION INTRAVENOUS at 23:14

## 2021-06-01 RX ADMIN — SODIUM CHLORIDE 1000 ML: 9 INJECTION, SOLUTION INTRAVENOUS at 20:27

## 2021-06-01 RX ADMIN — HYDROMORPHONE HYDROCHLORIDE 0.5 MG: 1 INJECTION, SOLUTION INTRAMUSCULAR; INTRAVENOUS; SUBCUTANEOUS at 22:51

## 2021-06-01 ASSESSMENT — PAIN DESCRIPTION - PROGRESSION
CLINICAL_PROGRESSION: GRADUALLY IMPROVING
CLINICAL_PROGRESSION: GRADUALLY IMPROVING
CLINICAL_PROGRESSION: RAPIDLY IMPROVING

## 2021-06-01 ASSESSMENT — PAIN SCALES - GENERAL
PAINLEVEL_OUTOF10: 4
PAINLEVEL_OUTOF10: 10
PAINLEVEL_OUTOF10: 10
PAINLEVEL_OUTOF10: 1
PAINLEVEL_OUTOF10: 3
PAINLEVEL_OUTOF10: 7

## 2021-06-01 ASSESSMENT — PAIN DESCRIPTION - LOCATION
LOCATION: ABDOMEN;CHEST

## 2021-06-01 ASSESSMENT — PAIN DESCRIPTION - DIRECTION
RADIATING_TOWARDS: BACK
RADIATING_TOWARDS: BACK

## 2021-06-01 ASSESSMENT — PAIN DESCRIPTION - PAIN TYPE
TYPE: ACUTE PAIN

## 2021-06-01 ASSESSMENT — PAIN DESCRIPTION - FREQUENCY: FREQUENCY: CONTINUOUS

## 2021-06-02 ENCOUNTER — APPOINTMENT (OUTPATIENT)
Dept: NUCLEAR MEDICINE | Age: 71
DRG: 419 | End: 2021-06-02
Payer: MEDICARE

## 2021-06-02 PROBLEM — R10.31 RLQ ABDOMINAL PAIN: Status: ACTIVE | Noted: 2021-06-02

## 2021-06-02 LAB
ABO/RH: NORMAL
ALBUMIN SERPL-MCNC: 3.9 G/DL (ref 3.5–5.2)
ALP BLD-CCNC: 86 U/L (ref 40–129)
ALT SERPL-CCNC: 33 U/L (ref 0–40)
ANION GAP SERPL CALCULATED.3IONS-SCNC: 12 MMOL/L (ref 7–16)
ANTIBODY SCREEN: NORMAL
AST SERPL-CCNC: 24 U/L (ref 0–39)
BILIRUB SERPL-MCNC: 0.9 MG/DL (ref 0–1.2)
BUN BLDV-MCNC: 19 MG/DL (ref 6–23)
CALCIUM SERPL-MCNC: 8.8 MG/DL (ref 8.6–10.2)
CHLORIDE BLD-SCNC: 102 MMOL/L (ref 98–107)
CO2: 21 MMOL/L (ref 22–29)
CREAT SERPL-MCNC: 0.8 MG/DL (ref 0.7–1.2)
EKG ATRIAL RATE: 68 BPM
EKG P AXIS: 24 DEGREES
EKG P-R INTERVAL: 146 MS
EKG Q-T INTERVAL: 398 MS
EKG QRS DURATION: 106 MS
EKG QTC CALCULATION (BAZETT): 423 MS
EKG R AXIS: -26 DEGREES
EKG T AXIS: 35 DEGREES
EKG VENTRICULAR RATE: 68 BPM
GFR AFRICAN AMERICAN: >60
GFR NON-AFRICAN AMERICAN: >60 ML/MIN/1.73
GLUCOSE BLD-MCNC: 255 MG/DL (ref 74–99)
HBA1C MFR BLD: 7.8 % (ref 4–5.6)
LACTIC ACID: 2.3 MMOL/L (ref 0.5–2.2)
LACTIC ACID: 2.8 MMOL/L (ref 0.5–2.2)
LIPASE: 15 U/L (ref 13–60)
METER GLUCOSE: 143 MG/DL (ref 74–99)
METER GLUCOSE: 188 MG/DL (ref 74–99)
METER GLUCOSE: 193 MG/DL (ref 74–99)
METER GLUCOSE: 226 MG/DL (ref 74–99)
METER GLUCOSE: 233 MG/DL (ref 74–99)
METER GLUCOSE: 234 MG/DL (ref 74–99)
POTASSIUM REFLEX MAGNESIUM: 4.1 MMOL/L (ref 3.5–5)
PROCALCITONIN: 2.11 NG/ML (ref 0–0.08)
SODIUM BLD-SCNC: 135 MMOL/L (ref 132–146)
TOTAL PROTEIN: 7 G/DL (ref 6.4–8.3)
TROPONIN, HIGH SENSITIVITY: 11 NG/L (ref 0–11)
TROPONIN, HIGH SENSITIVITY: 14 NG/L (ref 0–11)

## 2021-06-02 PROCEDURE — 6360000002 HC RX W HCPCS: Performed by: STUDENT IN AN ORGANIZED HEALTH CARE EDUCATION/TRAINING PROGRAM

## 2021-06-02 PROCEDURE — 1200000000 HC SEMI PRIVATE

## 2021-06-02 PROCEDURE — 3430000000 HC RX DIAGNOSTIC RADIOPHARMACEUTICAL: Performed by: RADIOLOGY

## 2021-06-02 PROCEDURE — 86850 RBC ANTIBODY SCREEN: CPT

## 2021-06-02 PROCEDURE — 6370000000 HC RX 637 (ALT 250 FOR IP): Performed by: STUDENT IN AN ORGANIZED HEALTH CARE EDUCATION/TRAINING PROGRAM

## 2021-06-02 PROCEDURE — 83690 ASSAY OF LIPASE: CPT

## 2021-06-02 PROCEDURE — 83036 HEMOGLOBIN GLYCOSYLATED A1C: CPT

## 2021-06-02 PROCEDURE — 84145 PROCALCITONIN (PCT): CPT

## 2021-06-02 PROCEDURE — 86901 BLOOD TYPING SEROLOGIC RH(D): CPT

## 2021-06-02 PROCEDURE — 83605 ASSAY OF LACTIC ACID: CPT

## 2021-06-02 PROCEDURE — 99223 1ST HOSP IP/OBS HIGH 75: CPT | Performed by: INTERNAL MEDICINE

## 2021-06-02 PROCEDURE — 82962 GLUCOSE BLOOD TEST: CPT

## 2021-06-02 PROCEDURE — 6360000002 HC RX W HCPCS: Performed by: INTERNAL MEDICINE

## 2021-06-02 PROCEDURE — 86900 BLOOD TYPING SEROLOGIC ABO: CPT

## 2021-06-02 PROCEDURE — 6370000000 HC RX 637 (ALT 250 FOR IP): Performed by: INTERNAL MEDICINE

## 2021-06-02 PROCEDURE — 78227 HEPATOBIL SYST IMAGE W/DRUG: CPT

## 2021-06-02 PROCEDURE — 2580000003 HC RX 258: Performed by: INTERNAL MEDICINE

## 2021-06-02 PROCEDURE — 84484 ASSAY OF TROPONIN QUANT: CPT

## 2021-06-02 PROCEDURE — 93010 ELECTROCARDIOGRAM REPORT: CPT | Performed by: INTERNAL MEDICINE

## 2021-06-02 PROCEDURE — 80053 COMPREHEN METABOLIC PANEL: CPT

## 2021-06-02 PROCEDURE — 36415 COLL VENOUS BLD VENIPUNCTURE: CPT

## 2021-06-02 PROCEDURE — A9537 TC99M MEBROFENIN: HCPCS | Performed by: RADIOLOGY

## 2021-06-02 PROCEDURE — 2580000003 HC RX 258: Performed by: STUDENT IN AN ORGANIZED HEALTH CARE EDUCATION/TRAINING PROGRAM

## 2021-06-02 RX ORDER — LEVOTHYROXINE SODIUM 0.03 MG/1
25 TABLET ORAL DAILY
Status: DISCONTINUED | OUTPATIENT
Start: 2021-06-02 | End: 2021-06-06 | Stop reason: HOSPADM

## 2021-06-02 RX ORDER — METOPROLOL SUCCINATE 25 MG/1
12.5 TABLET, EXTENDED RELEASE ORAL DAILY
Status: DISCONTINUED | OUTPATIENT
Start: 2021-06-02 | End: 2021-06-06 | Stop reason: HOSPADM

## 2021-06-02 RX ORDER — SODIUM CHLORIDE, SODIUM LACTATE, POTASSIUM CHLORIDE, CALCIUM CHLORIDE 600; 310; 30; 20 MG/100ML; MG/100ML; MG/100ML; MG/100ML
INJECTION, SOLUTION INTRAVENOUS CONTINUOUS
Status: DISCONTINUED | OUTPATIENT
Start: 2021-06-02 | End: 2021-06-04

## 2021-06-02 RX ORDER — SODIUM CHLORIDE, SODIUM LACTATE, POTASSIUM CHLORIDE, AND CALCIUM CHLORIDE .6; .31; .03; .02 G/100ML; G/100ML; G/100ML; G/100ML
500 INJECTION, SOLUTION INTRAVENOUS ONCE
Status: COMPLETED | OUTPATIENT
Start: 2021-06-02 | End: 2021-06-02

## 2021-06-02 RX ORDER — POLYETHYLENE GLYCOL 3350 17 G/17G
17 POWDER, FOR SOLUTION ORAL 2 TIMES DAILY
Status: DISCONTINUED | OUTPATIENT
Start: 2021-06-02 | End: 2021-06-06

## 2021-06-02 RX ORDER — SODIUM CHLORIDE 9 MG/ML
25 INJECTION, SOLUTION INTRAVENOUS EVERY 8 HOURS
Status: DISCONTINUED | OUTPATIENT
Start: 2021-06-02 | End: 2021-06-06 | Stop reason: HOSPADM

## 2021-06-02 RX ORDER — MORPHINE SULFATE 2 MG/ML
2 INJECTION, SOLUTION INTRAMUSCULAR; INTRAVENOUS EVERY 4 HOURS PRN
Status: DISCONTINUED | OUTPATIENT
Start: 2021-06-02 | End: 2021-06-02

## 2021-06-02 RX ORDER — TAMSULOSIN HYDROCHLORIDE 0.4 MG/1
0.8 CAPSULE ORAL DAILY
Status: DISCONTINUED | OUTPATIENT
Start: 2021-06-02 | End: 2021-06-06 | Stop reason: HOSPADM

## 2021-06-02 RX ORDER — POLYETHYLENE GLYCOL 3350 17 G/17G
17 POWDER, FOR SOLUTION ORAL DAILY PRN
Status: DISCONTINUED | OUTPATIENT
Start: 2021-06-02 | End: 2021-06-06

## 2021-06-02 RX ORDER — ONDANSETRON 2 MG/ML
4 INJECTION INTRAMUSCULAR; INTRAVENOUS EVERY 6 HOURS PRN
Status: DISCONTINUED | OUTPATIENT
Start: 2021-06-02 | End: 2021-06-06

## 2021-06-02 RX ORDER — ACETAMINOPHEN 650 MG/1
650 SUPPOSITORY RECTAL EVERY 6 HOURS PRN
Status: DISCONTINUED | OUTPATIENT
Start: 2021-06-02 | End: 2021-06-06

## 2021-06-02 RX ORDER — LISINOPRIL 20 MG/1
40 TABLET ORAL DAILY
Status: DISCONTINUED | OUTPATIENT
Start: 2021-06-02 | End: 2021-06-06 | Stop reason: HOSPADM

## 2021-06-02 RX ORDER — INSULIN GLARGINE 100 [IU]/ML
10 INJECTION, SOLUTION SUBCUTANEOUS NIGHTLY
Status: DISCONTINUED | OUTPATIENT
Start: 2021-06-02 | End: 2021-06-06 | Stop reason: HOSPADM

## 2021-06-02 RX ORDER — ONDANSETRON 4 MG/1
4 TABLET, ORALLY DISINTEGRATING ORAL EVERY 8 HOURS PRN
Status: DISCONTINUED | OUTPATIENT
Start: 2021-06-02 | End: 2021-06-06

## 2021-06-02 RX ORDER — SODIUM CHLORIDE 0.9 % (FLUSH) 0.9 %
5-40 SYRINGE (ML) INJECTION EVERY 12 HOURS SCHEDULED
Status: DISCONTINUED | OUTPATIENT
Start: 2021-06-02 | End: 2021-06-06

## 2021-06-02 RX ORDER — HYDRALAZINE HYDROCHLORIDE 25 MG/1
25 TABLET, FILM COATED ORAL 3 TIMES DAILY
Status: DISCONTINUED | OUTPATIENT
Start: 2021-06-02 | End: 2021-06-06 | Stop reason: HOSPADM

## 2021-06-02 RX ORDER — DEXTROSE MONOHYDRATE 50 MG/ML
100 INJECTION, SOLUTION INTRAVENOUS PRN
Status: DISCONTINUED | OUTPATIENT
Start: 2021-06-02 | End: 2021-06-06 | Stop reason: HOSPADM

## 2021-06-02 RX ORDER — NITROGLYCERIN 0.4 MG/1
0.4 TABLET SUBLINGUAL EVERY 5 MIN PRN
Status: DISCONTINUED | OUTPATIENT
Start: 2021-06-02 | End: 2021-06-06 | Stop reason: HOSPADM

## 2021-06-02 RX ORDER — SODIUM CHLORIDE 9 MG/ML
25 INJECTION, SOLUTION INTRAVENOUS PRN
Status: DISCONTINUED | OUTPATIENT
Start: 2021-06-02 | End: 2021-06-05

## 2021-06-02 RX ORDER — EZETIMIBE 10 MG/1
10 TABLET ORAL DAILY
Status: DISCONTINUED | OUTPATIENT
Start: 2021-06-02 | End: 2021-06-06 | Stop reason: HOSPADM

## 2021-06-02 RX ORDER — DEXTROSE MONOHYDRATE 25 G/50ML
12.5 INJECTION, SOLUTION INTRAVENOUS PRN
Status: DISCONTINUED | OUTPATIENT
Start: 2021-06-02 | End: 2021-06-06 | Stop reason: HOSPADM

## 2021-06-02 RX ORDER — ASPIRIN 81 MG/1
81 TABLET ORAL DAILY
Status: DISCONTINUED | OUTPATIENT
Start: 2021-06-02 | End: 2021-06-06 | Stop reason: HOSPADM

## 2021-06-02 RX ORDER — ACETAMINOPHEN 325 MG/1
650 TABLET ORAL EVERY 6 HOURS PRN
Status: DISCONTINUED | OUTPATIENT
Start: 2021-06-02 | End: 2021-06-06

## 2021-06-02 RX ORDER — NICOTINE POLACRILEX 4 MG
15 LOZENGE BUCCAL PRN
Status: DISCONTINUED | OUTPATIENT
Start: 2021-06-02 | End: 2021-06-06 | Stop reason: HOSPADM

## 2021-06-02 RX ORDER — SODIUM CHLORIDE 0.9 % (FLUSH) 0.9 %
5-40 SYRINGE (ML) INJECTION PRN
Status: DISCONTINUED | OUTPATIENT
Start: 2021-06-02 | End: 2021-06-06

## 2021-06-02 RX ORDER — AMLODIPINE BESYLATE 10 MG/1
10 TABLET ORAL DAILY
Status: DISCONTINUED | OUTPATIENT
Start: 2021-06-02 | End: 2021-06-06 | Stop reason: HOSPADM

## 2021-06-02 RX ADMIN — HYDRALAZINE HYDROCHLORIDE 25 MG: 25 TABLET, FILM COATED ORAL at 08:39

## 2021-06-02 RX ADMIN — LISINOPRIL 40 MG: 20 TABLET ORAL at 08:39

## 2021-06-02 RX ADMIN — HYDRALAZINE HYDROCHLORIDE 25 MG: 25 TABLET, FILM COATED ORAL at 21:32

## 2021-06-02 RX ADMIN — SODIUM CHLORIDE 25 ML: 9 INJECTION, SOLUTION INTRAVENOUS at 07:30

## 2021-06-02 RX ADMIN — INSULIN LISPRO 3 UNITS: 100 INJECTION, SOLUTION INTRAVENOUS; SUBCUTANEOUS at 16:46

## 2021-06-02 RX ADMIN — SODIUM CHLORIDE 25 ML: 9 INJECTION, SOLUTION INTRAVENOUS at 13:47

## 2021-06-02 RX ADMIN — MORPHINE SULFATE 2 MG: 2 INJECTION, SOLUTION INTRAMUSCULAR; INTRAVENOUS at 13:43

## 2021-06-02 RX ADMIN — SODIUM CHLORIDE, POTASSIUM CHLORIDE, SODIUM LACTATE AND CALCIUM CHLORIDE 500 ML: 600; 310; 30; 20 INJECTION, SOLUTION INTRAVENOUS at 13:45

## 2021-06-02 RX ADMIN — POLYETHYLENE GLYCOL 3350 17 G: 17 POWDER, FOR SOLUTION ORAL at 03:10

## 2021-06-02 RX ADMIN — POLYETHYLENE GLYCOL 3350 17 G: 17 POWDER, FOR SOLUTION ORAL at 08:40

## 2021-06-02 RX ADMIN — SODIUM CHLORIDE 25 ML: 9 INJECTION, SOLUTION INTRAVENOUS at 22:34

## 2021-06-02 RX ADMIN — INSULIN LISPRO 3 UNITS: 100 INJECTION, SOLUTION INTRAVENOUS; SUBCUTANEOUS at 18:40

## 2021-06-02 RX ADMIN — HYDROMORPHONE HYDROCHLORIDE 0.5 MG: 1 INJECTION, SOLUTION INTRAMUSCULAR; INTRAVENOUS; SUBCUTANEOUS at 21:32

## 2021-06-02 RX ADMIN — EZETIMIBE 10 MG: 10 TABLET ORAL at 08:39

## 2021-06-02 RX ADMIN — LEVOTHYROXINE SODIUM 25 MCG: 25 TABLET ORAL at 05:16

## 2021-06-02 RX ADMIN — ASPIRIN 81 MG: 81 TABLET, COATED ORAL at 08:39

## 2021-06-02 RX ADMIN — TAMSULOSIN HYDROCHLORIDE 0.8 MG: 0.4 CAPSULE ORAL at 08:39

## 2021-06-02 RX ADMIN — PIPERACILLIN AND TAZOBACTAM 3375 MG: 3; .375 INJECTION, POWDER, LYOPHILIZED, FOR SOLUTION INTRAVENOUS at 18:35

## 2021-06-02 RX ADMIN — SODIUM CHLORIDE, POTASSIUM CHLORIDE, SODIUM LACTATE AND CALCIUM CHLORIDE: 600; 310; 30; 20 INJECTION, SOLUTION INTRAVENOUS at 02:44

## 2021-06-02 RX ADMIN — PIPERACILLIN AND TAZOBACTAM 3375 MG: 3; .375 INJECTION, POWDER, LYOPHILIZED, FOR SOLUTION INTRAVENOUS at 12:38

## 2021-06-02 RX ADMIN — AMLODIPINE BESYLATE 10 MG: 10 TABLET ORAL at 08:39

## 2021-06-02 RX ADMIN — ENOXAPARIN SODIUM 40 MG: 40 INJECTION SUBCUTANEOUS at 08:39

## 2021-06-02 RX ADMIN — HYDRALAZINE HYDROCHLORIDE 25 MG: 25 TABLET, FILM COATED ORAL at 13:43

## 2021-06-02 RX ADMIN — SODIUM CHLORIDE, POTASSIUM CHLORIDE, SODIUM LACTATE AND CALCIUM CHLORIDE 500 ML: 600; 310; 30; 20 INJECTION, SOLUTION INTRAVENOUS at 14:58

## 2021-06-02 RX ADMIN — MORPHINE SULFATE 2 MG: 2 INJECTION, SOLUTION INTRAMUSCULAR; INTRAVENOUS at 03:12

## 2021-06-02 RX ADMIN — METOPROLOL SUCCINATE 12.5 MG: 25 TABLET, EXTENDED RELEASE ORAL at 08:39

## 2021-06-02 RX ADMIN — MORPHINE SULFATE 2 MG: 2 INJECTION, SOLUTION INTRAMUSCULAR; INTRAVENOUS at 08:40

## 2021-06-02 RX ADMIN — HYDROMORPHONE HYDROCHLORIDE 0.5 MG: 1 INJECTION, SOLUTION INTRAMUSCULAR; INTRAVENOUS; SUBCUTANEOUS at 18:31

## 2021-06-02 RX ADMIN — PIPERACILLIN AND TAZOBACTAM 3375 MG: 3; .375 INJECTION, POWDER, LYOPHILIZED, FOR SOLUTION INTRAVENOUS at 03:10

## 2021-06-02 RX ADMIN — POLYETHYLENE GLYCOL 3350 17 G: 17 POWDER, FOR SOLUTION ORAL at 21:32

## 2021-06-02 RX ADMIN — Medication 6 MILLICURIE: at 11:17

## 2021-06-02 RX ADMIN — INSULIN LISPRO 4 UNITS: 100 INJECTION, SOLUTION INTRAVENOUS; SUBCUTANEOUS at 12:42

## 2021-06-02 ASSESSMENT — PAIN DESCRIPTION - DESCRIPTORS
DESCRIPTORS: DISCOMFORT;SHOOTING;SORE
DESCRIPTORS: SORE;DISCOMFORT;CONSTANT
DESCRIPTORS: CONSTANT;ACHING;DISCOMFORT
DESCRIPTORS: CONSTANT;ACHING;DISCOMFORT

## 2021-06-02 ASSESSMENT — PAIN DESCRIPTION - ONSET
ONSET: ON-GOING

## 2021-06-02 ASSESSMENT — ENCOUNTER SYMPTOMS
ALLERGIC/IMMUNOLOGIC NEGATIVE: 1
EYE REDNESS: 0
NAUSEA: 1
EYE DISCHARGE: 0
COUGH: 0
CHOKING: 0
DIARRHEA: 0
SHORTNESS OF BREATH: 0
ABDOMINAL DISTENTION: 0
BLOOD IN STOOL: 0
VOMITING: 0
CHEST TIGHTNESS: 0
EYE ITCHING: 0
APNEA: 0
ANAL BLEEDING: 0
PHOTOPHOBIA: 0
RECTAL PAIN: 0
BACK PAIN: 0
EYE PAIN: 0
ABDOMINAL PAIN: 1
CONSTIPATION: 1

## 2021-06-02 ASSESSMENT — PAIN DESCRIPTION - LOCATION
LOCATION: ABDOMEN
LOCATION: ABDOMEN;CHEST
LOCATION: ABDOMEN
LOCATION: ABDOMEN

## 2021-06-02 ASSESSMENT — PAIN - FUNCTIONAL ASSESSMENT
PAIN_FUNCTIONAL_ASSESSMENT: PREVENTS OR INTERFERES SOME ACTIVE ACTIVITIES AND ADLS
PAIN_FUNCTIONAL_ASSESSMENT: ACTIVITIES ARE NOT PREVENTED
PAIN_FUNCTIONAL_ASSESSMENT: ACTIVITIES ARE NOT PREVENTED
PAIN_FUNCTIONAL_ASSESSMENT: PREVENTS OR INTERFERES SOME ACTIVE ACTIVITIES AND ADLS

## 2021-06-02 ASSESSMENT — PAIN DESCRIPTION - ORIENTATION
ORIENTATION: RIGHT
ORIENTATION: MID;RIGHT;UPPER
ORIENTATION: RIGHT;MID
ORIENTATION: RIGHT

## 2021-06-02 ASSESSMENT — PAIN SCALES - GENERAL
PAINLEVEL_OUTOF10: 0
PAINLEVEL_OUTOF10: 7
PAINLEVEL_OUTOF10: 10
PAINLEVEL_OUTOF10: 8
PAINLEVEL_OUTOF10: 1
PAINLEVEL_OUTOF10: 0
PAINLEVEL_OUTOF10: 4
PAINLEVEL_OUTOF10: 7
PAINLEVEL_OUTOF10: 0
PAINLEVEL_OUTOF10: 7
PAINLEVEL_OUTOF10: 4

## 2021-06-02 ASSESSMENT — PAIN DESCRIPTION - PAIN TYPE
TYPE: ACUTE PAIN

## 2021-06-02 ASSESSMENT — PAIN DESCRIPTION - PROGRESSION
CLINICAL_PROGRESSION: NOT CHANGED
CLINICAL_PROGRESSION: NOT CHANGED
CLINICAL_PROGRESSION: GRADUALLY WORSENING
CLINICAL_PROGRESSION: NOT CHANGED

## 2021-06-02 ASSESSMENT — PAIN DESCRIPTION - FREQUENCY
FREQUENCY: CONTINUOUS

## 2021-06-02 NOTE — ED PROVIDER NOTES
HPI:  6/1/21, Time: 8:43 PM EDT         Naomi Jovel is a 73 y.o. male presenting to the ED for abdominal pain beginning suddenly a few hours prior to arrival.  He describes as a sharp pain in his upper abdomen radiating to his back and into his chest.  There are no exacerbating or alleviating factors. He did not take anything for his pain. He reports associated symptoms of nausea but no emesis. Also states some mild nonbloody diarrhea earlier today. He denies documented fevers, shortness of breath, cough, constipation, or dysuria. Patient has a history of prior CAD status post stent placement 2 years ago. States that this pain feels different. He does not drink alcohol. He denies prior history of gallbladder disease or pancreatitis. Review of Systems:   Pertinent positives and negatives are stated within HPI, all other systems reviewed and are negative.          --------------------------------------------- PAST HISTORY ---------------------------------------------  Past Medical History:  has a past medical history of Arthritis, Coronary artery disease due to lipid rich plaque, Diabetes mellitus ([facility] [address] Hypercholesteremia, Hypertension, Squamous cell carcinoma, and Thyroid disease. Past Surgical History:  has a past surgical history that includes shoulder surgery; Carpal tunnel release; Tonsillectomy; Hemorrhoid surgery; Cosmetic surgery; fracture surgery; fracture surgery; skin biopsy; Coronary angioplasty with stent (04/08/2019); and Cardiac surgery. Social History:  reports that he has quit smoking. He quit smokeless tobacco use about 32 years ago. He reports that he does not drink alcohol and does not use drugs. Family History: family history includes Alzheimer's Disease in his mother; Cancer in his sister; Stroke in his father. The patients home medications have been reviewed.     Allergies: Statins    -------------------------------------------------- RESULTS -------------------------------------------------  All laboratory and radiology results have been personally reviewed by myself   LABS:  Results for orders placed or performed during the hospital encounter of 06/01/21   CBC Auto Differential   Result Value Ref Range    WBC 14.4 (H) 4.5 - 11.5 E9/L    RBC 5.17 3.80 - 5.80 E12/L    Hemoglobin 14.7 12.5 - 16.5 g/dL    Hematocrit 43.3 37.0 - 54.0 %    MCV 83.8 80.0 - 99.9 fL    MCH 28.4 26.0 - 35.0 pg    MCHC 33.9 32.0 - 34.5 %    RDW 13.2 11.5 - 15.0 fL    Platelets 014 968 - 923 E9/L    MPV 9.5 7.0 - 12.0 fL    Neutrophils % 82.1 (H) 43.0 - 80.0 %    Immature Granulocytes % 0.8 0.0 - 5.0 %    Lymphocytes % 8.2 (L) 20.0 - 42.0 %    Monocytes % 8.1 2.0 - 12.0 %    Eosinophils % 0.6 0.0 - 6.0 %    Basophils % 0.2 0.0 - 2.0 %    Neutrophils Absolute 11.83 (H) 1.80 - 7.30 E9/L    Immature Granulocytes # 0.11 E9/L    Lymphocytes Absolute 1.18 (L) 1.50 - 4.00 E9/L    Monocytes Absolute 1.17 (H) 0.10 - 0.95 E9/L    Eosinophils Absolute 0.08 0.05 - 0.50 E9/L    Basophils Absolute 0.03 0.00 - 0.20 B9/K   Basic Metabolic Panel w/ Reflex to MG   Result Value Ref Range    Sodium 138 132 - 146 mmol/L    Potassium reflex Magnesium 3.9 3.5 - 5.0 mmol/L    Chloride 101 98 - 107 mmol/L    CO2 22 22 - 29 mmol/L    Anion Gap 15 7 - 16 mmol/L    Glucose 203 (H) 74 - 99 mg/dL    BUN 23 6 - 23 mg/dL    CREATININE 1.0 0.7 - 1.2 mg/dL    GFR Non-African American >60 >=60 mL/min/1.73    GFR African American >60     Calcium 8.8 8.6 - 10.2 mg/dL   Hepatic function panel   Result Value Ref Range    Total Protein 7.0 6.4 - 8.3 g/dL    Albumin 3.9 3.5 - 5.2 g/dL    Alkaline Phosphatase 89 40 - 129 U/L    ALT 32 0 - 40 U/L    AST 23 0 - 39 U/L    Total Bilirubin 0.7 0.0 - 1.2 mg/dL    Bilirubin, Direct 0.3 0.0 - 0.3 mg/dL    Bilirubin, Indirect 0.4 0.0 - 1.0 mg/dL   Lipase   Result Value Ref Range    Lipase 175 (H) 13 - 60 U/L   Troponin   Result Value Ref Range    Troponin, High Sensitivity 11 0 - 11 ng/L   Lactic Acid, Plasma   Result Value Ref Range    Lactic Acid 2.4 (H) 0.5 - 2.2 mmol/L   Troponin   Result Value Ref Range    Troponin, High Sensitivity 10 0 - 11 ng/L   Comprehensive Metabolic Panel w/ Reflex to MG   Result Value Ref Range    Sodium 135 132 - 146 mmol/L    Potassium reflex Magnesium 4.1 3.5 - 5.0 mmol/L    Chloride 102 98 - 107 mmol/L    CO2 21 (L) 22 - 29 mmol/L    Anion Gap 12 7 - 16 mmol/L    Glucose 255 (H) 74 - 99 mg/dL    BUN 19 6 - 23 mg/dL    CREATININE 0.8 0.7 - 1.2 mg/dL    GFR Non-African American >60 >=60 mL/min/1.73    GFR African American >60     Calcium 8.8 8.6 - 10.2 mg/dL    Total Protein 7.0 6.4 - 8.3 g/dL    Albumin 3.9 3.5 - 5.2 g/dL    Total Bilirubin 0.9 0.0 - 1.2 mg/dL    Alkaline Phosphatase 86 40 - 129 U/L    ALT 33 0 - 40 U/L    AST 24 0 - 39 U/L   Lactic acid, plasma   Result Value Ref Range    Lactic Acid 2.3 (H) 0.5 - 2.2 mmol/L   Troponin   Result Value Ref Range    Troponin, High Sensitivity 11 0 - 11 ng/L   POCT Glucose   Result Value Ref Range    Meter Glucose 233 (H) 74 - 99 mg/dL   POCT Glucose   Result Value Ref Range    Meter Glucose 234 (H) 74 - 99 mg/dL   EKG 12 Lead   Result Value Ref Range    Ventricular Rate 70 BPM    Atrial Rate 70 BPM    P-R Interval 124 ms    QRS Duration 94 ms    Q-T Interval 392 ms    QTc Calculation (Bazett) 423 ms    P Axis 24 degrees    R Axis -30 degrees    T Axis 53 degrees       RADIOLOGY:  Interpreted by Radiologist.  CT ABDOMEN PELVIS W IV CONTRAST Additional Contrast? None   Final Result   Mild gallbladder distension. Correlate with right upper quadrant pain. Diffuse colonic fecal retention. Significant prostatomegaly. US GALLBLADDER RUQ   Final Result   Gallbladder sludge in an otherwise normal-appearing gallbladder. Normal   common bile duct. Hepatomegaly at 21.0 cm with diffuse fatty infiltration of the liver. 2.8 cm soft tissue density near the juan hepatis versus bowel.          NM HEPATOBILIARY SCAN W EJECTION FRACTION    (Results Pending)       ------------------------- NURSING NOTES AND VITALS REVIEWED ---------------------------   The nursing notes within the ED encounter and vital signs as below have been reviewed. BP (!) 168/77   Pulse 94   Temp 98 °F (36.7 °C) (Oral)   Resp 18   Ht 5' 9\" (1.753 m)   Wt 202 lb (91.6 kg)   SpO2 93%   BMI 29.83 kg/m²   Oxygen Saturation Interpretation: Normal      ---------------------------------------------------PHYSICAL EXAM--------------------------------------      Constitutional/General: Alert and oriented x3, appears uncomfortable, non toxic in NAD  Head: Normocephalic and atraumatic  Eyes: PERRL, EOMI  Mouth: Oropharynx clear, handling secretions, no trismus  Neck: Supple, full ROM,   Pulmonary: Lungs clear to auscultation bilaterally, no wheezes, rales, or rhonchi. Not in respiratory distress  Cardiovascular:  Regular rate and rhythm, no murmurs, gallops, or rubs. 2+ distal pulses  Abdomen: Soft, non distended, diffuse upper abdominal tenderness worse in right upper quadrant, no rebound, no guarding. Extremities: Moves all extremities x 4. Warm and well perfused. No lower extremity edema, no calf tenderness. Skin: warm and dry without rash  Neurologic: GCS 15, no focal motor or sensory deficits   Psych: Normal Affect.  Behavior normal.      ------------------------------ ED COURSE/MEDICAL DECISION MAKING----------------------  Medications   tamsulosin (FLOMAX) capsule 0.8 mg (has no administration in time range)   nitroGLYCERIN (NITROSTAT) SL tablet 0.4 mg (has no administration in time range)   metoprolol succinate (TOPROL XL) extended release tablet 12.5 mg (has no administration in time range)   lisinopril (PRINIVIL;ZESTRIL) tablet 40 mg (has no administration in time range)   levothyroxine (SYNTHROID) tablet 25 mcg (25 mcg Oral Given 6/2/21 0516)   hydrALAZINE (APRESOLINE) tablet 25 mg (has no administration in time range) ezetimibe (ZETIA) tablet 10 mg (has no administration in time range)   aspirin EC tablet 81 mg (has no administration in time range)   amLODIPine (NORVASC) tablet 10 mg (has no administration in time range)   sodium chloride flush 0.9 % injection 5-40 mL (has no administration in time range)   sodium chloride flush 0.9 % injection 5-40 mL (has no administration in time range)   0.9 % sodium chloride infusion (has no administration in time range)   enoxaparin (LOVENOX) injection 40 mg (has no administration in time range)   ondansetron (ZOFRAN-ODT) disintegrating tablet 4 mg (has no administration in time range)     Or   ondansetron (ZOFRAN) injection 4 mg (has no administration in time range)   polyethylene glycol (GLYCOLAX) packet 17 g (has no administration in time range)   acetaminophen (TYLENOL) tablet 650 mg (has no administration in time range)     Or   acetaminophen (TYLENOL) suppository 650 mg (has no administration in time range)   polyethylene glycol (GLYCOLAX) packet 17 g (17 g Oral Given 6/2/21 0310)   piperacillin-tazobactam (ZOSYN) 3,375 mg in dextrose 5 % 50 mL IVPB extended infusion (mini-bag) (3,375 mg Intravenous New Bag 6/2/21 0310)   lactated ringers infusion ( Intravenous New Bag 6/2/21 0244)   morphine (PF) injection 2 mg (2 mg Intravenous Given 6/2/21 0312)   insulin lispro (HUMALOG) injection vial 0-12 Units (0 Units Subcutaneous Held 6/2/21 0800)   insulin lispro (HUMALOG) injection vial 0-6 Units (has no administration in time range)   pi===Zosyn post-infusion flush=== (has no administration in time range)   morphine sulfate (PF) injection 4 mg (4 mg Intravenous Given 6/1/21 2028)   ondansetron (ZOFRAN) injection 4 mg (4 mg Intravenous Given 6/1/21 2028)   0.9 % sodium chloride bolus (0 mLs Intravenous Stopped 6/1/21 2114)   HYDROmorphone (DILAUDID) injection 0.5 mg (0.5 mg Intravenous Given 6/1/21 2251)   iopamidol (ISOVUE-370) 76 % injection 75 mL (75 mLs Intravenous Given 6/1/21 8840) Medical Decision Making/ED COURSE:   Patient is a 80-year-old male presenting with abdominal pain and nausea. In the ED, patient was  hemomedically stable and afebrile. On exam, patient appeared uncomfortable with diffuse upper abdominal tenderness, worse in his right upper quadrant. Patient was placed on the cardiac monitor. I interpreted findings. Rhythm - sinus. Labs and [address] obtained. Patient administered IVF, zofran, and morphine. I reviewed and interpreted labs. Lipase was elevated at 175. Patient also had leukocytosis of 14.4. Patient also had hyperglycemia of 203 but no anion gap to suggest DKA. Lactic acid was 2.4. Gallbladder ultrasound showed gallbladder sludge without definite findings of acute cholecystitis. CT abdomen pelvis showed gallbladder distention. Patient remained significantly tender on reevaluation. Concern for acute cholecystitis. General surgery was consulted, they will provide further recommendations. Hospitalist was consulted, and they will admit. Pain appears GI in etiology. No acute EKG changes. High-sensitivity troponin negative x2. Patient remained hemodynamically stable throughout ED course. ED Course as of Jun 02 0554   Tue Jun 01, 2021 2013 EKG: This EKG is signed and interpreted by me. Rate: 68  Rhythm: Sinus  Interpretation: Normal sinus rhythm, sinus arrhythmia, normal MD interval, normal QRS, normal QT interval, no acute ST or T wave changes  Comparison: no previous EKG available      [JA]   Wed Jun 02, 2021   [place] [address] surgery was consulted. Spoke with Dr. Osbaldo Clements. [JA]   0023 Hospitalist was consulted. Dr. Mellisa Fall accepted the patient for admission. [JA]      ED Course User Index  [JA] Gena Salcido MD           Counseling: The emergency provider has spoken with the patient and discussed todays results, in addition to providing specific details for the plan of care and counseling regarding the diagnosis and prognosis.   Questions are answered at this time and they are agreeable with the plan.      --------------------------------- IMPRESSION AND DISPOSITION ---------------------------------    IMPRESSION  1. Right upper quadrant abdominal pain    2. Gallbladder sludge        DISPOSITION  Disposition: Admit to med/surg floor  Patient condition is stable      NOTE: This report was transcribed using voice recognition software.  Every effort was made to ensure accuracy; however, inadvertent computerized transcription errors may be present    I, Kristen Sorenson MD, am the primary provider of this record       Kristen Sorenson MD  06/02/21 0708

## 2021-06-02 NOTE — PROGRESS NOTES
Patient does not know home medications, stated his wife will be at the hospital in the morning with the list for us.

## 2021-06-02 NOTE — H&P
[facility]   History and Physical      CHIEF COMPLAINT:  Right upper quadrant abdominal pain  Informant(s) for H&P: Patient himself    History of Present Illness:  73 y.o. male with a history of CAD status post stents, obesity, hypertension, type 2 diabetes mellitus presents with complaints of abdominal pain which started about 4 to 5 hours prior to presentation. Patient developed generalized abdominal pain which she said was radiating up to his chest.  He was concerned when the chest radiation started that he was having a heart attack. He reports nausea, but no vomiting. He reports frequent episodes of constipation, states that he has a hard time moving his bowel, however his last bowel movement was today. He denies any diarrhea, no shortness of breath, no cough, no fever. No significant genitourinary complaints. On arrival to the ED, his vitals were essentially unremarkable except for blood pressure being on the high side. His work-up shows elevated lipase of 175, CBC with leukocytosis of 14.4, BMP shows hyperglycemia with a glucose of 203, his LFTs were not elevated, baseline troponins normal, lactic acid was elevated at 2.4. Second set of troponin was 10. REVIEW OF SYSTEMS:  Review of Systems   Constitutional: Negative for activity change, appetite change, chills, diaphoresis and fatigue. HENT: Negative for congestion, dental problem, ear pain, hearing loss, mouth sores and nosebleeds. Eyes: Negative for photophobia, pain, discharge, redness and itching. Respiratory: Negative for apnea, cough, choking, chest tightness and shortness of breath. Gastrointestinal: Positive for abdominal pain, constipation and nausea. Negative for abdominal distention, anal bleeding, blood in stool, diarrhea, rectal pain and vomiting. Endocrine: Negative for cold intolerance, heat intolerance, polydipsia and polyphagia.    Genitourinary: Negative for difficulty urinating, dysuria, frequency, genital sores, hematuria and penile pain. Musculoskeletal: Negative for arthralgias, back pain, gait problem, joint swelling and myalgias. Allergic/Immunologic: Negative. Neurological: Negative for dizziness, seizures, facial asymmetry, speech difficulty, light-headedness, numbness and headaches. Hematological: Negative. Psychiatric/Behavioral: Negative for behavioral problems, confusion and hallucinations. The patient is not nervous/anxious and is not hyperactive. PMH:  Past Medical History:   Diagnosis Date    Arthritis     Coronary artery disease due to lipid rich plaque 5/29/2019    Diabetes mellitus ([facility] [address]     Hypercholesteremia     Hypertension     Squamous cell carcinoma     Bottom lip had cancer cut off    Thyroid disease        Surgical History:  Past Surgical History:   Procedure Laterality Date    CARDIAC SURGERY      may 2019 stents x2    CARPAL TUNNEL RELEASE      bilateral    CORONARY ANGIOPLASTY WITH STENT PLACEMENT  04/08/2019    Reso;[place] 2.25 x15mm and resolute macho 2.25x8 to 1st diagonal artery by DR Aruna Bernal      for his cancer on his lower lip    FRACTURE SURGERY      Fractured left ankle times two    FRACTURE SURGERY      broke 2 transverse processes in his back    HEMORRHOID SURGERY      SHOULDER SURGERY      right    SKIN BIOPSY      TONSILLECTOMY         Medications Prior to Admission:    Prior to Admission medications    Medication Sig Start Date End Date Taking?  Authorizing Provider   Cholecalciferol (VITAMIN D3) 5000 units TABS Take by mouth   Yes Historical Provider, MD   Glucosamine HCl (GLUCOSAMINE MAXIMUM STRENGTH) 1500 MG TABS Take by mouth daily Indications: contains msm   Yes Historical Provider, MD   aspirin 81 MG EC tablet Take 1 tablet by mouth daily 4/9/19  Yes Ashok Plascencia MD   amLODIPine (NORVASC) 10 MG tablet Take 1 tablet by mouth daily 4/9/19  Yes Ashok Plascencia MD   GLIPIZIDE PO Take 10 mg by mouth daily    Yes Historical Provider, MD   clopidogrel (PLAVIX) 75 MG tablet Take 1 tablet by mouth daily 6/22/20   Kathe Kingsley DO   hydrALAZINE (APRESOLINE) 25 MG tablet Take 1 tablet by mouth 3 times daily 10/31/19   Kathe Kingsley,    nitroGLYCERIN (NITROSTAT) 0.4 MG SL tablet up to max of 3 total doses. If no relief after 1 dose, call 911. 4/9/19   Pina Kyle MD   metoprolol succinate (TOPROL XL) 25 MG extended release tablet Take 0.5 tablets by mouth daily  Patient taking differently: Take 12.5 mg by mouth 2 times daily  4/9/19   Pina Kyle MD   lisinopril (PRINIVIL;ZESTRIL) 40 MG tablet Take 1 tablet by mouth daily 4/10/19   Pina Kyle, MD   levothyroxine (SYNTHROID) 25 MCG tablet Take 25 mcg by mouth Daily    Historical Provider, MD   ketotifen ( KETOTIFEN FUMARATE) 0.025 % ophthalmic solution Place 1 drop into both eyes 2 times daily    Historical Provider, MD   tamsulosin (FLOMAX) 0.4 MG capsule Take 0.8 mg by mouth daily    Historical Provider, MD   ezetimibe (ZETIA) 10 MG tablet Take 10 mg by mouth daily Patient can not take regular statins    Historical Provider, MD   metFORMIN (GLUCOPHAGE) 1000 MG tablet Take 1,000 mg by mouth Daily with supper     Historical Provider, MD       Allergies:    Statins    Social History:    reports that he has quit smoking. He quit smokeless tobacco use about 32 years ago. He reports that he does not drink alcohol and does not use drugs. Family History:   family history includes Alzheimer's Disease in his mother; Cancer in his sister; Stroke in his father.      PHYSICAL EXAM:  Vitals:  BP (!) 148/69   Pulse 97   Temp 97.3 °F (36.3 °C) (Tympanic)   Resp 16   Ht 5' 9\" (1.753 m)   Wt 202 lb (91.6 kg)   SpO2 94%   BMI 29.83 kg/m²     General Appearance: alert and oriented to person, place and time and in no acute distress  Skin: warm and dry  Head: normocephalic and atraumatic  Eyes: pupils equal, round, and reactive to light, extraocular eye movements intact, conjunctivae normal  Neck: neck supple and non tender without mass   Pulmonary/Chest: clear to auscultation bilaterally- no wheezes, rales or rhonchi, normal air movement, no respiratory distress  Cardiovascular: normal rate, normal S1 and S2 and no carotid bruits  Abdomen: soft, right upper quadrant/ flank is tender, non-distended, normal bowel sounds, no masses or organomegaly  Extremities: no cyanosis, no clubbing and no edema  Neurologic: no cranial nerve deficit and speech normal    LABS:  Recent Labs     06/01/21 2032      K 3.9      CO2 22   BUN 23   CREATININE 1.0   GLUCOSE 203*   CALCIUM 8.8       Recent Labs     06/01/21 2032   WBC 14.4*   RBC 5.17   HGB 14.7   HCT 43.3   MCV 83.8   MCH 28.4   MCHC 33.9   RDW 13.2      MPV 9.5       No results for input(s): POCGLU in the last 72 hours.     CBC:   Lab Results   Component Value Date    WBC 14.4 06/01/2021    RBC 5.17 06/01/2021    HGB 14.7 06/01/2021    HCT 43.3 06/01/2021    MCV 83.8 06/01/2021    MCH 28.4 06/01/2021    MCHC 33.9 06/01/2021    RDW 13.2 06/01/2021     06/01/2021    MPV 9.5 06/01/2021     CBC with Differential:    Lab Results   Component Value Date    WBC 14.4 06/01/2021    RBC 5.17 06/01/2021    HGB 14.7 06/01/2021    HCT 43.3 06/01/2021     06/01/2021    MCV 83.8 06/01/2021    MCH 28.4 06/01/2021    MCHC 33.9 06/01/2021    RDW 13.2 06/01/2021    LYMPHOPCT 8.2 06/01/2021    MONOPCT 8.1 06/01/2021    BASOPCT 0.2 06/01/2021    MONOSABS 1.17 06/01/2021    LYMPHSABS 1.18 06/01/2021    EOSABS 0.08 06/01/2021    BASOSABS 0.03 06/01/2021     WBC:    Lab Results   Component Value Date    WBC 14.4 06/01/2021     CMP:    Lab Results   Component Value Date     06/01/2021    K 3.9 06/01/2021     06/01/2021    CO2 22 06/01/2021    BUN 23 06/01/2021    CREATININE 1.0 06/01/2021    GFRAA >60 06/01/2021    LABGLOM >60 06/01/2021    GLUCOSE 203 06/01/2021    PROT 7.0 06/01/2021    LABALBU 3.9 06/01/2021    CALCIUM 8.8 06/01/2021    BILITOT 0.7 06/01/2021    ALKPHOS 89 06/01/2021    AST 23 06/01/2021    ALT 32 06/01/2021     Hepatic Function Panel:    Lab Results   Component Value Date    ALKPHOS 89 06/01/2021    ALT 32 06/01/2021    AST 23 06/01/2021    PROT 7.0 06/01/2021    BILITOT 0.7 06/01/2021    BILIDIR 0.3 06/01/2021    IBILI 0.4 06/01/2021    LABALBU 3.9 06/01/2021     Troponin:    Lab Results   Component Value Date    TROPONINI <0.01 05/14/2019     HgBA1c:    Lab Results   Component Value Date    LABA1C 7.4 04/05/2019       Radiology: CT ABDOMEN PELVIS W IV CONTRAST Additional Contrast? None    Result Date: 6/1/2021  EXAMINATION: CT OF THE ABDOMEN AND PELVIS WITH CONTRAST 6/1/2021 11:15 pm TECHNIQUE: CT of the abdomen and pelvis was performed with the administration of intravenous contrast. Multiplanar reformatted images are provided for review. Dose modulation, iterative reconstruction, and/or weight based adjustment of the mA/kV was utilized to reduce the radiation dose to as low as reasonably achievable. COMPARISON: None. HISTORY: ORDERING SYSTEM PROVIDED HISTORY: upper abdominal pain TECHNOLOGIST PROVIDED HISTORY: Additional Contrast?->None Reason for exam:->upper abdominal pain Decision Support Exception - unselect if not a suspected or confirmed emergency medical condition->Emergency Medical Condition (MA) FINDINGS: Lower Chest: The visualized lungs, heart and pericardium are normal. Calcified plaque involving the coronary arteries. Organs: A the liver a, spleen, adrenal glands, pancreas are unremarkable. Bilateral renal cysts. Bilateral subcentimeter nonobstructing renal calculi. Mildly distended gallbladder. GI/Bowel: Diffuse colonic fecal retention. Normal small bowel. Normal appendix. Pelvis: Significant prostatomegaly. Normal urinary bladder. Peritoneum/Retroperitoneum: No free air or free fluid. Bones/Soft Tissues: Degenerative changes thoracolumbar spine.   Small fat containing umbilical hernia. Mild gallbladder distension. Correlate with right upper quadrant pain. Diffuse colonic fecal retention. Significant prostatomegaly. US GALLBLADDER RUQ    Result Date: 6/1/2021  EXAMINATION: RIGHT UPPER QUADRANT ULTRASOUND 6/1/2021 9:46 pm COMPARISON: None. HISTORY: ORDERING SYSTEM PROVIDED HISTORY: RUQ pain, eval for cholecystitis TECHNOLOGIST PROVIDED HISTORY: Reason for exam:->RUQ pain, eval for cholecystitis What reading provider will be dictating this exam?->CRC FINDINGS: LIVER: Hepatomegaly at 21.0 cm with diffuse fatty infiltration of the liver. No evidence of intrahepatic biliary ductal dilatation. BILIARY SYSTEM: Gallbladder sludge. No stones. No pericholecystic fluid, wall thickening or stones. Negative sonographic Frausto's sign. Common bile duct is within normal limits measuring . RIGHT KIDNEY: The right kidney is grossly unremarkable without evidence of hydronephrosis. PANCREAS:  Visualized portions of the pancreas are unremarkable. OTHER: No evidence of right upper quadrant ascites. 2.8 cm hypoechoic area near the juan hepatis. Gallbladder sludge in an otherwise normal-appearing gallbladder. Normal common bile duct. Hepatomegaly at 21.0 cm with diffuse fatty infiltration of the liver. 2.8 cm soft tissue density near the juan hepatis versus bowel. EKG: NSR    ASSESSMENT and PLAN:    1. Acute pancreatitis, may be secondary to cholecystitis: Patient has significant right upper quadrant tenderness, however gallbladder ultrasound shows gallbladder sludge in an otherwise normal-appearing gallbladder. CT abdomen pelvis shows diffuse constipation. Patient has leukocytosis, lactic acidosis, and significant tenderness. Will need to rule out acute cholecystitis. HIDA scan ordered  Keep patient n.p.o. for now. Continue IV fluids  Start empirical coverage with IV Zosyn  Gen Surgery was consulted in the ED, will see patient in the morning.     2.  Essential hypertension: Continue home medications, the patient is on metoprolol and amlodipine, lisinopril    3. Type 2 diabetes mellitus: Hold off on Metformin, start on sliding scale. 4.  History of CAD status post stents: Patient reports chest pain, however this is atypical.  EKG does not show any ST segment changes and troponin x2 are negative. We will continue serial cardiac enzyme monitoring and telemetry. 5. Hypothyroidism: Continue Synthroid 25 mcg daily    Code Status: Full code  DVT prophylaxis: Lovenox subcu    NOTE: This report was transcribed using voice recognition software.  Every effort was made to ensure accuracy; however, inadvertent computerized transcription errors may be present.     Electronically signed by Lauren Cox MD on 6/2/2021 at 1:22 AM

## 2021-06-02 NOTE — CONSULTS
GENERAL SURGERY  CONSULT NOTE  6/2/2021    Physician Consulted: Dr. Shabana Hernandez  Reason for Consult: abd pain  Referring Physician: Dr. Armani LYLES  Mark Schneider is a 73 y.o. male who presents for evaluation of abdominal pain. PMH CAD status post stents, obesity, hypertension, type 2 diabetes mellitus. States he ate a donut yesterday and chicken. Noted acute onset upper abdominal pain. Reports having occasional \"upset stomach\" episodes in past few months. C/o nausea. No vomiting. Intermittent constipation. .  Last BM yesterday. No diarrhea. Surgery consulted to evaluate for biliary pathology. Admitted with pancreatitis    Denies alcohol use. No prior abdominal surgeries; no prior colonoscopy          Past Medical History:   Diagnosis Date    Arthritis     Coronary artery disease due to lipid rich plaque 5/29/2019    Diabetes mellitus ([address]     Hypercholesteremia     Hypertension     Squamous cell carcinoma     Bottom lip had cancer cut off    Thyroid disease        Past Surgical History:   Procedure Laterality Date    CARDIAC SURGERY      may 2019 stents x2    CARPAL TUNNEL RELEASE      bilateral    CORONARY ANGIOPLASTY WITH STENT PLACEMENT  04/08/2019    Reso;[place] Macho 2.25 x15mm and resolute macho 2.25x8 to 1st diagonal artery by DR Aruna Bernal      for his cancer on his lower lip    FRACTURE SURGERY      Fractured left ankle times two    FRACTURE SURGERY      broke 2 transverse processes in his back    HEMORRHOID SURGERY      SHOULDER SURGERY      right    SKIN BIOPSY      TONSILLECTOMY         Medications Prior to Admission    Prior to Admission medications    Medication Sig Start Date End Date Taking?  Authorizing Provider   clopidogrel (PLAVIX) 75 MG tablet Take 1 tablet by mouth daily 6/22/20  Yes Jose A Dunne DO   hydrALAZINE (APRESOLINE) 25 MG tablet Take 1 tablet by mouth 3 times daily 10/31/19  Yes Jose A Dunne DO   Cholecalciferol (VITAMIN D3) 5000 units TABS PT SITTING UP IN CHAIR AT BEDSIDE. PT IS ALERT AND ORIENTED X3. PERRLA. LUNGS
ARE CLEAR. RESP ARE EVEN AND UNLABORED. HEART RATE REGULAR. PULSES PALPABLE
THROUGHOUT. NO EDEMA NOTED. BS ACTIVE. PT DENIES A BM TODAY. PT DENIES ANY
NAUSEA AND VOMITTING. #20 LAC. SALINE LOCKED. NO REDNESS OR EDEMA NOTED AT
SITE. WILL CONTINUE TO MONITOR Take by mouth   Yes Historical Provider, MD   Glucosamine HCl (GLUCOSAMINE MAXIMUM STRENGTH) 1500 MG TABS Take by mouth daily Indications: contains msm   Yes Historical Provider, MD   aspirin 81 MG EC tablet Take 1 tablet by mouth daily 4/9/19  Yes Humera Patel MD   nitroGLYCERIN (NITROSTAT) 0.4 MG SL tablet up to max of 3 total doses.  If no relief after 1 dose, call 911. 4/9/19  Yes Humera aPtel MD   metoprolol succinate (TOPROL XL) 25 MG extended release tablet Take 0.5 tablets by mouth daily  Patient taking differently: Take 12.5 mg by mouth 2 times daily  4/9/19  Yes Humera Patel MD   amLODIPine (NORVASC) 10 MG tablet Take 1 tablet by mouth daily 4/9/19  Yes Humera Patel MD   lisinopril (PRINIVIL;ZESTRIL) 40 MG tablet Take 1 tablet by mouth daily 4/10/19  Yes Humera Patel MD   levothyroxine (SYNTHROID) 25 MCG tablet Take 25 mcg by mouth Daily   Yes Historical Provider, MD   ketotifen ( KETOTIFEN FUMARATE) 0.025 % ophthalmic solution Place 1 drop into both eyes 2 times daily   Yes Historical Provider, MD   tamsulosin (FLOMAX) 0.4 MG capsule Take 0.8 mg by mouth daily   Yes Historical Provider, MD   ezetimibe (ZETIA) 10 MG tablet Take 10 mg by mouth daily Patient can not take regular statins   Yes Historical Provider, MD   GLIPIZIDE PO Take 10 mg by mouth daily    Yes Historical Provider, MD   metFORMIN (GLUCOPHAGE) 1000 MG tablet Take 1,000 mg by mouth Daily with supper    Yes Historical Provider, MD       Allergies   Allergen Reactions    Statins      States he is intolerant        Family History   Problem Relation Age of Onset    Alzheimer's Disease Mother     Stroke Father     Cancer Sister        Social History     Tobacco Use    Smoking status: Former Smoker    Smokeless tobacco: Former User     Quit date: 10/10/1988    Tobacco comment: Quit years ago when in [place] Vaping Use: Never assessed   Substance Use Topics    Alcohol use: No    Drug use: No         Review of Systems: General ROS: negative  Hematological and Lymphatic ROS: negative  Respiratory ROS: no cough, shortness of breath, or wheezing  Cardiovascular ROS: no chest pain or dyspnea on exertion  Gastrointestinal ROS: positive for - abdominal pain  Genito-Urinary ROS: no dysuria, trouble voiding, or hematuria  Musculoskeletal ROS: negative      PHYSICAL EXAM:    Vitals:    06/02/21 0230   BP: (!) 168/77   Pulse: 94   Resp: 18   Temp: 98 °F (36.7 °C)   SpO2: 93%       GENERAL:  NAD. A&Ox3. HEAD:  Normocephalic. Atraumatic. EYES:   No scleral icterus. PERRL. LUNGS:  No increased work of breathing. CARDIOVASCULAR: RR  ABDOMEN:  Soft, non-distended, non-tender. No guarding, rigidity, rebound. EXTREMITIES:   MAEx4. Atraumatic. No LE edema. SKIN:  Warm and dry  NEUROLOGIC:  GCS       LABS:    CBC  Recent Labs     06/01/21 2032   WBC 14.4*   HGB 14.7   HCT 43.3        BMP  Recent Labs     06/02/21  0338      K 4.1      CO2 21*   BUN 19   CREATININE 0.8   CALCIUM 8.8     Liver Function  Recent Labs     06/01/21 2032 06/02/21  0338   LIPASE 175*  --    BILITOT 0.7 0.9   BILIDIR 0.3  --    AST 23 24   ALT 32 33   ALKPHOS 89 86   PROT 7.0 7.0   LABALBU 3.9 3.9     No results for input(s): LACTATE in the last 72 hours. No results for input(s): INR, PTT in the last 72 hours. Invalid input(s): PT    RADIOLOGY    CT ABDOMEN PELVIS W IV CONTRAST Additional Contrast? None    Result Date: 6/1/2021  EXAMINATION: CT OF THE ABDOMEN AND PELVIS WITH CONTRAST 6/1/2021 11:15 pm TECHNIQUE: CT of the abdomen and pelvis was performed with the administration of intravenous contrast. Multiplanar reformatted images are provided for review. Dose modulation, iterative reconstruction, and/or weight based adjustment of the mA/kV was utilized to reduce the radiation dose to as low as reasonably achievable. COMPARISON: None.  HISTORY: ORDERING SYSTEM PROVIDED HISTORY: upper abdominal pain TECHNOLOGIST PROVIDED HISTORY: Additional Contrast?->None Reason for exam:->upper abdominal pain Decision Support Exception - unselect if not a suspected or confirmed emergency medical condition->Emergency Medical Condition (MA) FINDINGS: Lower Chest: The visualized lungs, heart and pericardium are normal. Calcified plaque involving the coronary arteries. Organs: A the liver a, spleen, adrenal glands, pancreas are unremarkable. Bilateral renal cysts. Bilateral subcentimeter nonobstructing renal calculi. Mildly distended gallbladder. GI/Bowel: Diffuse colonic fecal retention. Normal small bowel. Normal appendix. Pelvis: Significant prostatomegaly. Normal urinary bladder. Peritoneum/Retroperitoneum: No free air or free fluid. Bones/Soft Tissues: Degenerative changes thoracolumbar spine. Small fat containing umbilical hernia. Mild gallbladder distension. Correlate with right upper quadrant pain. Diffuse colonic fecal retention. Significant prostatomegaly. US GALLBLADDER RUQ    Result Date: 6/1/2021  EXAMINATION: RIGHT UPPER QUADRANT ULTRASOUND 6/1/2021 9:46 pm COMPARISON: None. HISTORY: ORDERING SYSTEM PROVIDED HISTORY: RUQ pain, eval for cholecystitis TECHNOLOGIST PROVIDED HISTORY: Reason for exam:->RUQ pain, eval for cholecystitis What reading provider will be dictating this exam?->CRC FINDINGS: LIVER: Hepatomegaly at 21.0 cm with diffuse fatty infiltration of the liver. No evidence of intrahepatic biliary ductal dilatation. BILIARY SYSTEM: Gallbladder sludge. No stones. No pericholecystic fluid, wall thickening or stones. Negative sonographic Frausto's sign. Common bile duct is within normal limits measuring . RIGHT KIDNEY: The right kidney is grossly unremarkable without evidence of hydronephrosis. PANCREAS:  Visualized portions of the pancreas are unremarkable. OTHER: No evidence of right upper quadrant ascites. 2.8 cm hypoechoic area near the juan hepatis. Gallbladder sludge in an otherwise normal-appearing gallbladder.   Normal common bile duct. Hepatomegaly at 21.0 cm with diffuse fatty infiltration of the liver. 2.8 cm soft tissue density near the juan hepatis versus bowel. ASSESSMENT/PLAN:  73 y.o. male with signs and symptoms acute cholecystitis     Clear liquid diet  N.p.o. after midnight  IV fluids  Continue IV antibiotics  Plan for cholecystectomy in the operating room 6/3 depending on HIDA scan  Pain control    The patient was seen and examined and the chart was reviewed. I agree with the assessment and plan. The ultrasound of the gallbladder showed sludge but no other findings consistent with acute cholecystitis. HIDA scan is pending.       Plan:    Continue antibiotics  Await HIDA results  Possible robotic gallbladder tomorrow

## 2021-06-02 NOTE — ED NOTES
Patient returned from ultrasound and placed on monitor. Repeat troponin drawn and sent to lab.      Chastity Sabillon RN  06/01/21 3684

## 2021-06-02 NOTE — CARE COORDINATION
Social Work / Discharge Planning : SW and CM met with patient and spouse and explained role as discharge planner/ transition of care. Patient admitted with Abdominal pain. Patient verified plan at discharge is HOME. AWait plan. Patient resides independently with spouse. Patient denies hx of HHC/SNF. Patient sees LEANNA De La Cruz through the [place]. He also gets his medications through [place] and uses [facility] [facility] as well. Patient currently denies any needs at discharge and spouse can transport. AWait plan. CM did discuss patient VA benefit vs [employer] They want [employer] during this stay. SW to follow.  Electronically signed by Rico Shone, LSW on 6/2/21 at 9:20 AM EDT

## 2021-06-02 NOTE — PROGRESS NOTES
Patient admitted after midnight. Please see H&P for full details. 74 y/o with RUQ pain. Awaiting results of HIDA. General surgery consulted, appreciate their recommendations.     Pain control, IVF, NPO

## 2021-06-03 ENCOUNTER — ANESTHESIA (OUTPATIENT)
Dept: OPERATING ROOM | Age: 71
DRG: 419 | End: 2021-06-03
Payer: MEDICARE

## 2021-06-03 ENCOUNTER — ANESTHESIA EVENT (OUTPATIENT)
Dept: OPERATING ROOM | Age: 71
DRG: 419 | End: 2021-06-03
Payer: MEDICARE

## 2021-06-03 VITALS — OXYGEN SATURATION: 96 % | SYSTOLIC BLOOD PRESSURE: 229 MMHG | DIASTOLIC BLOOD PRESSURE: 105 MMHG | TEMPERATURE: 99 F

## 2021-06-03 LAB
ALBUMIN SERPL-MCNC: 3 G/DL (ref 3.5–5.2)
ALP BLD-CCNC: 85 U/L (ref 40–129)
ALT SERPL-CCNC: 31 U/L (ref 0–40)
ANION GAP SERPL CALCULATED.3IONS-SCNC: 10 MMOL/L (ref 7–16)
AST SERPL-CCNC: 25 U/L (ref 0–39)
BACTERIA: ABNORMAL /HPF
BASOPHILS ABSOLUTE: 0.02 E9/L (ref 0–0.2)
BASOPHILS RELATIVE PERCENT: 0.1 % (ref 0–2)
BILIRUB SERPL-MCNC: 1.1 MG/DL (ref 0–1.2)
BILIRUBIN URINE: NEGATIVE
BLOOD, URINE: ABNORMAL
BUN BLDV-MCNC: 22 MG/DL (ref 6–23)
BURR CELLS: ABNORMAL
CALCIUM SERPL-MCNC: 8.8 MG/DL (ref 8.6–10.2)
CHLORIDE BLD-SCNC: 103 MMOL/L (ref 98–107)
CLARITY: CLEAR
CO2: 22 MMOL/L (ref 22–29)
COLOR: YELLOW
CREAT SERPL-MCNC: 1.1 MG/DL (ref 0.7–1.2)
EOSINOPHILS ABSOLUTE: 0.01 E9/L (ref 0.05–0.5)
EOSINOPHILS RELATIVE PERCENT: 0.1 % (ref 0–6)
GFR AFRICAN AMERICAN: >60
GFR NON-AFRICAN AMERICAN: >60 ML/MIN/1.73
GLUCOSE BLD-MCNC: 158 MG/DL (ref 74–99)
GLUCOSE URINE: 250 MG/DL
HCT VFR BLD CALC: 42.9 % (ref 37–54)
HEMOGLOBIN: 13.9 G/DL (ref 12.5–16.5)
IMMATURE GRANULOCYTES #: 0.08 E9/L
IMMATURE GRANULOCYTES %: 0.6 % (ref 0–5)
INR BLD: 1.8
KETONES, URINE: NEGATIVE MG/DL
LEUKOCYTE ESTERASE, URINE: NEGATIVE
LIPASE: 14 U/L (ref 13–60)
LYMPHOCYTES ABSOLUTE: 0.53 E9/L (ref 1.5–4)
LYMPHOCYTES RELATIVE PERCENT: 3.8 % (ref 20–42)
MCH RBC QN AUTO: 27.9 PG (ref 26–35)
MCHC RBC AUTO-ENTMCNC: 32.4 % (ref 32–34.5)
MCV RBC AUTO: 86 FL (ref 80–99.9)
METER GLUCOSE: 149 MG/DL (ref 74–99)
METER GLUCOSE: 151 MG/DL (ref 74–99)
METER GLUCOSE: 165 MG/DL (ref 74–99)
METER GLUCOSE: 172 MG/DL (ref 74–99)
METER GLUCOSE: 174 MG/DL (ref 74–99)
METER GLUCOSE: 177 MG/DL (ref 74–99)
METER GLUCOSE: 179 MG/DL (ref 74–99)
MONOCYTES ABSOLUTE: 0.54 E9/L (ref 0.1–0.95)
MONOCYTES RELATIVE PERCENT: 3.9 % (ref 2–12)
NEUTROPHILS ABSOLUTE: 12.75 E9/L (ref 1.8–7.3)
NEUTROPHILS RELATIVE PERCENT: 91.5 % (ref 43–80)
NITRITE, URINE: NEGATIVE
OVALOCYTES: ABNORMAL
PDW BLD-RTO: 13.7 FL (ref 11.5–15)
PH UA: 5.5 (ref 5–9)
PLATELET # BLD: 245 E9/L (ref 130–450)
PMV BLD AUTO: 9.6 FL (ref 7–12)
POIKILOCYTES: ABNORMAL
POLYCHROMASIA: ABNORMAL
POTASSIUM REFLEX MAGNESIUM: 3.7 MMOL/L (ref 3.5–5)
PROTEIN UA: 100 MG/DL
PROTHROMBIN TIME: 19.7 SEC (ref 9.3–12.4)
RBC # BLD: 4.99 E12/L (ref 3.8–5.8)
RBC UA: ABNORMAL /HPF (ref 0–2)
SODIUM BLD-SCNC: 135 MMOL/L (ref 132–146)
SPECIFIC GRAVITY UA: >=1.03 (ref 1–1.03)
TOTAL PROTEIN: 6.2 G/DL (ref 6.4–8.3)
UROBILINOGEN, URINE: 1 E.U./DL
WBC # BLD: 13.9 E9/L (ref 4.5–11.5)
WBC UA: ABNORMAL /HPF (ref 0–5)

## 2021-06-03 PROCEDURE — 0FT44ZZ RESECTION OF GALLBLADDER, PERCUTANEOUS ENDOSCOPIC APPROACH: ICD-10-PCS | Performed by: SURGERY

## 2021-06-03 PROCEDURE — 3700000001 HC ADD 15 MINUTES (ANESTHESIA): Performed by: SURGERY

## 2021-06-03 PROCEDURE — 87081 CULTURE SCREEN ONLY: CPT

## 2021-06-03 PROCEDURE — 6360000002 HC RX W HCPCS: Performed by: STUDENT IN AN ORGANIZED HEALTH CARE EDUCATION/TRAINING PROGRAM

## 2021-06-03 PROCEDURE — 2580000003 HC RX 258: Performed by: STUDENT IN AN ORGANIZED HEALTH CARE EDUCATION/TRAINING PROGRAM

## 2021-06-03 PROCEDURE — 2709999900 HC NON-CHARGEABLE SUPPLY: Performed by: SURGERY

## 2021-06-03 PROCEDURE — 7100000001 HC PACU RECOVERY - ADDTL 15 MIN: Performed by: SURGERY

## 2021-06-03 PROCEDURE — 2500000003 HC RX 250 WO HCPCS: Performed by: STUDENT IN AN ORGANIZED HEALTH CARE EDUCATION/TRAINING PROGRAM

## 2021-06-03 PROCEDURE — 6370000000 HC RX 637 (ALT 250 FOR IP): Performed by: STUDENT IN AN ORGANIZED HEALTH CARE EDUCATION/TRAINING PROGRAM

## 2021-06-03 PROCEDURE — 82962 GLUCOSE BLOOD TEST: CPT

## 2021-06-03 PROCEDURE — 7100000000 HC PACU RECOVERY - FIRST 15 MIN: Performed by: SURGERY

## 2021-06-03 PROCEDURE — 6360000002 HC RX W HCPCS: Performed by: INTERNAL MEDICINE

## 2021-06-03 PROCEDURE — 85025 COMPLETE CBC W/AUTO DIFF WBC: CPT

## 2021-06-03 PROCEDURE — 6360000002 HC RX W HCPCS: Performed by: NURSE ANESTHETIST, CERTIFIED REGISTERED

## 2021-06-03 PROCEDURE — S2900 ROBOTIC SURGICAL SYSTEM: HCPCS | Performed by: SURGERY

## 2021-06-03 PROCEDURE — 2500000003 HC RX 250 WO HCPCS: Performed by: NURSE ANESTHETIST, CERTIFIED REGISTERED

## 2021-06-03 PROCEDURE — 1200000000 HC SEMI PRIVATE

## 2021-06-03 PROCEDURE — 83690 ASSAY OF LIPASE: CPT

## 2021-06-03 PROCEDURE — 6370000000 HC RX 637 (ALT 250 FOR IP): Performed by: INTERNAL MEDICINE

## 2021-06-03 PROCEDURE — 8E0W4CZ ROBOTIC ASSISTED PROCEDURE OF TRUNK REGION, PERCUTANEOUS ENDOSCOPIC APPROACH: ICD-10-PCS | Performed by: SURGERY

## 2021-06-03 PROCEDURE — 99232 SBSQ HOSP IP/OBS MODERATE 35: CPT | Performed by: INTERNAL MEDICINE

## 2021-06-03 PROCEDURE — 3600000019 HC SURGERY ROBOT ADDTL 15MIN: Performed by: SURGERY

## 2021-06-03 PROCEDURE — 85610 PROTHROMBIN TIME: CPT

## 2021-06-03 PROCEDURE — 3600000009 HC SURGERY ROBOT BASE: Performed by: SURGERY

## 2021-06-03 PROCEDURE — 36415 COLL VENOUS BLD VENIPUNCTURE: CPT

## 2021-06-03 PROCEDURE — 80053 COMPREHEN METABOLIC PANEL: CPT

## 2021-06-03 PROCEDURE — 6360000002 HC RX W HCPCS: Performed by: ANESTHESIOLOGY

## 2021-06-03 PROCEDURE — 88304 TISSUE EXAM BY PATHOLOGIST: CPT

## 2021-06-03 PROCEDURE — 2580000003 HC RX 258: Performed by: INTERNAL MEDICINE

## 2021-06-03 PROCEDURE — 81001 URINALYSIS AUTO W/SCOPE: CPT

## 2021-06-03 PROCEDURE — 3700000000 HC ANESTHESIA ATTENDED CARE: Performed by: SURGERY

## 2021-06-03 RX ORDER — ROCURONIUM BROMIDE 10 MG/ML
INJECTION, SOLUTION INTRAVENOUS PRN
Status: DISCONTINUED | OUTPATIENT
Start: 2021-06-03 | End: 2021-06-03 | Stop reason: SDUPTHER

## 2021-06-03 RX ORDER — MEPERIDINE HYDROCHLORIDE 25 MG/ML
12.5 INJECTION INTRAMUSCULAR; INTRAVENOUS; SUBCUTANEOUS EVERY 5 MIN PRN
Status: DISCONTINUED | OUTPATIENT
Start: 2021-06-03 | End: 2021-06-03 | Stop reason: HOSPADM

## 2021-06-03 RX ORDER — INDOCYANINE GREEN AND WATER 25 MG
5 KIT INJECTION SEE ADMIN INSTRUCTIONS
Status: DISCONTINUED | OUTPATIENT
Start: 2021-06-03 | End: 2021-06-05

## 2021-06-03 RX ORDER — PROPOFOL 10 MG/ML
INJECTION, EMULSION INTRAVENOUS PRN
Status: DISCONTINUED | OUTPATIENT
Start: 2021-06-03 | End: 2021-06-03 | Stop reason: SDUPTHER

## 2021-06-03 RX ORDER — LIDOCAINE HYDROCHLORIDE 20 MG/ML
INJECTION, SOLUTION EPIDURAL; INFILTRATION; INTRACAUDAL; PERINEURAL PRN
Status: DISCONTINUED | OUTPATIENT
Start: 2021-06-03 | End: 2021-06-03 | Stop reason: SDUPTHER

## 2021-06-03 RX ORDER — ONDANSETRON 2 MG/ML
INJECTION INTRAMUSCULAR; INTRAVENOUS PRN
Status: DISCONTINUED | OUTPATIENT
Start: 2021-06-03 | End: 2021-06-03 | Stop reason: SDUPTHER

## 2021-06-03 RX ORDER — INDOCYANINE GREEN AND WATER 25 MG
KIT INJECTION
Status: COMPLETED
Start: 2021-06-03 | End: 2021-06-03

## 2021-06-03 RX ORDER — DIPHENHYDRAMINE HYDROCHLORIDE 50 MG/ML
12.5 INJECTION INTRAMUSCULAR; INTRAVENOUS
Status: DISCONTINUED | OUTPATIENT
Start: 2021-06-03 | End: 2021-06-03 | Stop reason: HOSPADM

## 2021-06-03 RX ORDER — FENTANYL CITRATE 50 UG/ML
INJECTION, SOLUTION INTRAMUSCULAR; INTRAVENOUS PRN
Status: DISCONTINUED | OUTPATIENT
Start: 2021-06-03 | End: 2021-06-03 | Stop reason: SDUPTHER

## 2021-06-03 RX ADMIN — SODIUM CHLORIDE, POTASSIUM CHLORIDE, SODIUM LACTATE AND CALCIUM CHLORIDE: 600; 310; 30; 20 INJECTION, SOLUTION INTRAVENOUS at 19:58

## 2021-06-03 RX ADMIN — POLYETHYLENE GLYCOL 3350 17 G: 17 POWDER, FOR SOLUTION ORAL at 08:14

## 2021-06-03 RX ADMIN — HYDROMORPHONE HYDROCHLORIDE 1 MG: 1 INJECTION, SOLUTION INTRAMUSCULAR; INTRAVENOUS; SUBCUTANEOUS at 02:00

## 2021-06-03 RX ADMIN — INSULIN LISPRO 2 UNITS: 100 INJECTION, SOLUTION INTRAVENOUS; SUBCUTANEOUS at 14:34

## 2021-06-03 RX ADMIN — INDOCYANINE GREEN AND WATER 5 MG: KIT at 17:00

## 2021-06-03 RX ADMIN — FENTANYL CITRATE 50 MCG: 50 INJECTION, SOLUTION INTRAMUSCULAR; INTRAVENOUS at 17:57

## 2021-06-03 RX ADMIN — ONDANSETRON 4 MG: 2 INJECTION INTRAMUSCULAR; INTRAVENOUS at 17:25

## 2021-06-03 RX ADMIN — INSULIN LISPRO 2 UNITS: 100 INJECTION, SOLUTION INTRAVENOUS; SUBCUTANEOUS at 11:00

## 2021-06-03 RX ADMIN — ROCURONIUM BROMIDE 50 MG: 10 INJECTION, SOLUTION INTRAVENOUS at 17:03

## 2021-06-03 RX ADMIN — EZETIMIBE 10 MG: 10 TABLET ORAL at 08:14

## 2021-06-03 RX ADMIN — PROPOFOL 130 MG: 10 INJECTION, EMULSION INTRAVENOUS at 17:03

## 2021-06-03 RX ADMIN — PHENYLEPHRINE HYDROCHLORIDE 200 MCG: 10 INJECTION INTRAVENOUS at 17:13

## 2021-06-03 RX ADMIN — SODIUM CHLORIDE, POTASSIUM CHLORIDE, SODIUM LACTATE AND CALCIUM CHLORIDE: 600; 310; 30; 20 INJECTION, SOLUTION INTRAVENOUS at 14:37

## 2021-06-03 RX ADMIN — SODIUM CHLORIDE 25 ML: 9 INJECTION, SOLUTION INTRAVENOUS at 14:37

## 2021-06-03 RX ADMIN — AMLODIPINE BESYLATE 10 MG: 10 TABLET ORAL at 08:14

## 2021-06-03 RX ADMIN — FENTANYL CITRATE 100 MCG: 50 INJECTION, SOLUTION INTRAMUSCULAR; INTRAVENOUS at 17:03

## 2021-06-03 RX ADMIN — SODIUM CHLORIDE 25 ML: 9 INJECTION, SOLUTION INTRAVENOUS at 23:33

## 2021-06-03 RX ADMIN — POLYETHYLENE GLYCOL 3350 17 G: 17 POWDER, FOR SOLUTION ORAL at 20:06

## 2021-06-03 RX ADMIN — METOPROLOL SUCCINATE 12.5 MG: 25 TABLET, EXTENDED RELEASE ORAL at 08:14

## 2021-06-03 RX ADMIN — HYDROMORPHONE HYDROCHLORIDE 1 MG: 1 INJECTION, SOLUTION INTRAMUSCULAR; INTRAVENOUS; SUBCUTANEOUS at 06:32

## 2021-06-03 RX ADMIN — INSULIN GLARGINE 10 UNITS: 100 INJECTION, SOLUTION SUBCUTANEOUS at 20:07

## 2021-06-03 RX ADMIN — SODIUM CHLORIDE, PRESERVATIVE FREE 10 ML: 5 INJECTION INTRAVENOUS at 20:06

## 2021-06-03 RX ADMIN — FENTANYL CITRATE 100 MCG: 50 INJECTION, SOLUTION INTRAMUSCULAR; INTRAVENOUS at 18:02

## 2021-06-03 RX ADMIN — LEVOTHYROXINE SODIUM 25 MCG: 25 TABLET ORAL at 06:32

## 2021-06-03 RX ADMIN — INDOCYANINE GREEN AND WATER 5 MG: KIT at 16:48

## 2021-06-03 RX ADMIN — PHENYLEPHRINE HYDROCHLORIDE 100 MCG: 10 INJECTION INTRAVENOUS at 17:28

## 2021-06-03 RX ADMIN — HYDRALAZINE HYDROCHLORIDE 25 MG: 25 TABLET, FILM COATED ORAL at 08:14

## 2021-06-03 RX ADMIN — PIPERACILLIN AND TAZOBACTAM 3375 MG: 3; .375 INJECTION, POWDER, LYOPHILIZED, FOR SOLUTION INTRAVENOUS at 20:06

## 2021-06-03 RX ADMIN — INSULIN LISPRO 2 UNITS: 100 INJECTION, SOLUTION INTRAVENOUS; SUBCUTANEOUS at 20:08

## 2021-06-03 RX ADMIN — TAMSULOSIN HYDROCHLORIDE 0.8 MG: 0.4 CAPSULE ORAL at 08:14

## 2021-06-03 RX ADMIN — HYDRALAZINE HYDROCHLORIDE 25 MG: 25 TABLET, FILM COATED ORAL at 20:06

## 2021-06-03 RX ADMIN — LIDOCAINE HYDROCHLORIDE 100 MG: 20 INJECTION, SOLUTION EPIDURAL; INFILTRATION; INTRACAUDAL; PERINEURAL at 17:03

## 2021-06-03 RX ADMIN — SODIUM CHLORIDE 25 ML: 9 INJECTION, SOLUTION INTRAVENOUS at 07:30

## 2021-06-03 RX ADMIN — INSULIN LISPRO 3 UNITS: 100 INJECTION, SOLUTION INTRAVENOUS; SUBCUTANEOUS at 23:29

## 2021-06-03 RX ADMIN — HYDROMORPHONE HYDROCHLORIDE 0.5 MG: 1 INJECTION, SOLUTION INTRAMUSCULAR; INTRAVENOUS; SUBCUTANEOUS at 18:43

## 2021-06-03 RX ADMIN — SODIUM CHLORIDE, POTASSIUM CHLORIDE, SODIUM LACTATE AND CALCIUM CHLORIDE: 600; 310; 30; 20 INJECTION, SOLUTION INTRAVENOUS at 06:32

## 2021-06-03 RX ADMIN — ONDANSETRON 4 MG: 2 INJECTION INTRAMUSCULAR; INTRAVENOUS at 10:58

## 2021-06-03 RX ADMIN — PIPERACILLIN AND TAZOBACTAM 3375 MG: 3; .375 INJECTION, POWDER, LYOPHILIZED, FOR SOLUTION INTRAVENOUS at 03:23

## 2021-06-03 RX ADMIN — PIPERACILLIN AND TAZOBACTAM 3375 MG: 3; .375 INJECTION, POWDER, LYOPHILIZED, FOR SOLUTION INTRAVENOUS at 11:02

## 2021-06-03 RX ADMIN — HYDROMORPHONE HYDROCHLORIDE 0.5 MG: 1 INJECTION, SOLUTION INTRAMUSCULAR; INTRAVENOUS; SUBCUTANEOUS at 23:23

## 2021-06-03 ASSESSMENT — PULMONARY FUNCTION TESTS
PIF_VALUE: 35
PIF_VALUE: 31
PIF_VALUE: 34
PIF_VALUE: 1
PIF_VALUE: 30
PIF_VALUE: 34
PIF_VALUE: 30
PIF_VALUE: 34
PIF_VALUE: 1
PIF_VALUE: 30
PIF_VALUE: 25
PIF_VALUE: 2
PIF_VALUE: 34
PIF_VALUE: 2
PIF_VALUE: 35
PIF_VALUE: 32
PIF_VALUE: 35
PIF_VALUE: 34
PIF_VALUE: 35
PIF_VALUE: 33
PIF_VALUE: 29
PIF_VALUE: 1
PIF_VALUE: 32
PIF_VALUE: 33
PIF_VALUE: 2
PIF_VALUE: 34
PIF_VALUE: 3
PIF_VALUE: 32
PIF_VALUE: 29
PIF_VALUE: 34
PIF_VALUE: 33
PIF_VALUE: 28
PIF_VALUE: 1
PIF_VALUE: 29
PIF_VALUE: 27
PIF_VALUE: 28
PIF_VALUE: 35
PIF_VALUE: 34
PIF_VALUE: 33
PIF_VALUE: 35
PIF_VALUE: 29
PIF_VALUE: 29
PIF_VALUE: 34
PIF_VALUE: 33
PIF_VALUE: 29
PIF_VALUE: 34
PIF_VALUE: 20
PIF_VALUE: 34
PIF_VALUE: 33
PIF_VALUE: 33
PIF_VALUE: 35
PIF_VALUE: 29
PIF_VALUE: 29

## 2021-06-03 ASSESSMENT — PAIN SCALES - GENERAL
PAINLEVEL_OUTOF10: 7
PAINLEVEL_OUTOF10: 0
PAINLEVEL_OUTOF10: 0
PAINLEVEL_OUTOF10: 7
PAINLEVEL_OUTOF10: 9
PAINLEVEL_OUTOF10: 8
PAINLEVEL_OUTOF10: 0
PAINLEVEL_OUTOF10: 2
PAINLEVEL_OUTOF10: 0
PAINLEVEL_OUTOF10: 6
PAINLEVEL_OUTOF10: 5
PAINLEVEL_OUTOF10: 5

## 2021-06-03 ASSESSMENT — PAIN DESCRIPTION - PAIN TYPE
TYPE: SURGICAL PAIN
TYPE: ACUTE PAIN
TYPE: SURGICAL PAIN

## 2021-06-03 ASSESSMENT — PAIN DESCRIPTION - LOCATION
LOCATION: ABDOMEN

## 2021-06-03 ASSESSMENT — PAIN DESCRIPTION - PROGRESSION
CLINICAL_PROGRESSION: GRADUALLY IMPROVING
CLINICAL_PROGRESSION: GRADUALLY WORSENING
CLINICAL_PROGRESSION: GRADUALLY IMPROVING
CLINICAL_PROGRESSION: GRADUALLY IMPROVING
CLINICAL_PROGRESSION: GRADUALLY WORSENING

## 2021-06-03 ASSESSMENT — PAIN DESCRIPTION - DIRECTION
RADIATING_TOWARDS: RIGHT
RADIATING_TOWARDS: BACK

## 2021-06-03 ASSESSMENT — PAIN DESCRIPTION - ONSET
ONSET: ON-GOING
ONSET: ON-GOING

## 2021-06-03 ASSESSMENT — PAIN DESCRIPTION - FREQUENCY
FREQUENCY: INTERMITTENT
FREQUENCY: CONTINUOUS

## 2021-06-03 ASSESSMENT — PAIN DESCRIPTION - ORIENTATION
ORIENTATION: RIGHT
ORIENTATION: MID
ORIENTATION: MID
ORIENTATION: MID;OUTER

## 2021-06-03 ASSESSMENT — PAIN - FUNCTIONAL ASSESSMENT
PAIN_FUNCTIONAL_ASSESSMENT: 0-10
PAIN_FUNCTIONAL_ASSESSMENT: PREVENTS OR INTERFERES SOME ACTIVE ACTIVITIES AND ADLS
PAIN_FUNCTIONAL_ASSESSMENT: PREVENTS OR INTERFERES SOME ACTIVE ACTIVITIES AND ADLS

## 2021-06-03 ASSESSMENT — PAIN DESCRIPTION - DESCRIPTORS
DESCRIPTORS: ACHING;SORE;DISCOMFORT
DESCRIPTORS: DISCOMFORT
DESCRIPTORS: SORE
DESCRIPTORS: ACHING;DISCOMFORT;SORE;SHOOTING

## 2021-06-03 ASSESSMENT — LIFESTYLE VARIABLES: SMOKING_STATUS: 0

## 2021-06-03 NOTE — OP NOTE
Operative Note      Patient: Jhoana Webb  YOB: 1950  MRN: [id number]    Date of Procedure: 6/3/2021    Pre-Op Diagnosis: Acute Cholecystitis     Post-Op Diagnosis: Same necrotic gallbladder       Procedure: laparoscopic robot assisted subtotal cholecystectomy with drain placement     Surgeon(s):  Yahaira Reid MD    Assistant:   Resident: James Garcia DO    Anesthesia: General    Estimated Blood Loss (mL): Minimal    Complications: None    Specimens:   * No specimens in log *    Implants:  * No implants in log *      Drains:   Closed/Suction Drain Right RLQ Bulb 15 Samoan (Active)   Dressing Status Clean;Dry; Intact 06/03/21 1745   Drainage Appearance Bloody 06/03/21 1745   Status To bulb suction 06/03/21 1745       Findings: necrotic gallbladder, subtotal cholecystectomy     Detailed Description of Procedure:   A stab incision was made in the LUQ and a Veress needle was inserted and confirmed to be in place with the saline drip test. The abdomen was insufflated to 15 mmHg. A 8mm supraumbilical incision was made and a robotic trocar was inserted. The abdomen was inspected. In a similar fashion, three more 8mm ports were inserted, one in the left mid abdomen and two in the right. The robot was docked and prograsp foreceps as well as monopolar hook cautery were inserted. When the gallbladder was grasped, it was noted that it was necrotic. After continuing to grasp this, decision was made to decompress the gallbladder. Once decompressed and the bile was suctioned the gallbladder continued to fall apart and due to this and the inability to clear identify the critical view decision was made to perform a subtotal cholecystectomy. Using the cautery USP up the gallbladder the anterior wall was taken off. This was placed into a endocatch bag and removed. The area was copiously irrigated and suctioned until clear.  A 15 Fr jr drain was placed through one of the port sites and placed in the gallbladder fossa and in the open remaining gallbladder. This was attached to the skin with a 3-0 Nylon suture. Hemostasis was observed. The abdomen was decompressed and the remaining ports were removed. The skin was then closed with 4-0 Vicryl inverted interrupted dermal sutures. The incisions were cleaned and dried, and Dermaflex was applied. Needle, sponge, and instrument counts were reported as correct x2. The patient tolerated the procedure well without complications. Dr. Bridger Garber was present and scrubbed throughout the case. DISPOSITION: Anesthesia was discontinued and the patient was extubated prior to leaving the OR. He was transferred to the recovery area in good condition.      Electronically signed by Laura Franks DO on 6/3/2021 at 5:58 PM

## 2021-06-03 NOTE — PROGRESS NOTES
[facility]   Progress Note    Admitting Date and Time: 6/1/2021  7:30 PM  Admit Dx: RLQ abdominal pain [R10.31]     Seen for follow on Rt abdominal pain / Acute cholecystitis     Subjective:  Continues with rt abdominal pain , nausea. Denies CP or sob. Has + HIDA & is scheduled for OR today. ROS: denies cp, sob, HA unless stated above.      indocyanine green  5 mg Intravenous See Admin Instructions    tamsulosin  0.8 mg Oral Daily    metoprolol succinate  12.5 mg Oral Daily    lisinopril  40 mg Oral Daily    levothyroxine  25 mcg Oral Daily    hydrALAZINE  25 mg Oral TID    ezetimibe  10 mg Oral Daily    [Held by provider] aspirin  81 mg Oral Daily    amLODIPine  10 mg Oral Daily    sodium chloride flush  5-40 mL Intravenous 2 times per day    enoxaparin  40 mg Subcutaneous Daily    polyethylene glycol  17 g Oral BID    piperacillin-tazobactam  3,375 mg Intravenous Q8H    insulin lispro  0-18 Units Subcutaneous Q4H    insulin lispro  0-9 Units Subcutaneous Nightly    insulin glargine  10 Units Subcutaneous Nightly     nitroGLYCERIN, 0.4 mg, Q5 Min PRN  sodium chloride flush, 5-40 mL, PRN  sodium chloride, 25 mL, PRN  ondansetron, 4 mg, Q8H PRN   Or  ondansetron, 4 mg, Q6H PRN  polyethylene glycol, 17 g, Daily PRN  acetaminophen, 650 mg, Q6H PRN   Or  acetaminophen, 650 mg, Q6H PRN  HYDROmorphone, 0.5 mg, Q3H PRN   Or  HYDROmorphone, 1 mg, Q3H PRN  glucose, 15 g, PRN  dextrose, 12.5 g, PRN  glucagon (rDNA), 1 mg, PRN  dextrose, 100 mL/hr, PRN         Objective:    /69   Pulse 90   Temp 98.8 °F (37.1 °C) (Oral)   Resp 18   Ht 5' 9\" (1.753 m)   Wt 202 lb 8 oz (91.9 kg)   SpO2 92%   BMI 29.90 kg/m²   General Appearance: alert and oriented to person, place and time, in no acute distress, Uncomfortable sec to pain  Skin: warm and dry  Head: normocephalic and atraumatic  Eyes: pupils equal, round, and reactive to light, extraocular eye movements intact, conjunctivae normal  Neck: supple and non-tender without mass  Pulmonary/Chest: clear to auscultation bilaterally  Cardiovascular: normal rate, regular rhythm, normal S1 and S2  Abdomen: soft,+ rt sided tenderness , non-distended, normal bowel sounds, no masses or organomegaly  Extremities: no cyanosis, clubbing Musculoskeletal: normal range of motion  Neurologic:  no cranial nerve deficit,speech normal      Recent Labs     06/01/21 2032 06/02/21  0338 06/03/21  0327    135 135   K 3.9 4.1 3.7    102 103   CO2 22 21* 22   BUN 23 19 22   CREATININE 1.0 0.8 1.1   GLUCOSE 203* 255* 158*   CALCIUM 8.8 8.8 8.8       Recent Labs     06/01/21 2032 06/03/21  0327   WBC 14.4* 13.9*   RBC 5.17 4.99   HGB 14.7 13.9   HCT 43.3 42.9   MCV 83.8 86.0   MCH 28.4 27.9   MCHC 33.9 32.4   RDW 13.2 13.7    245   MPV 9.5 9.6       Labs and images reviewed    Radiology:   NM HEPATOBILIARY SCAN W EJECTION FRACTION   Final Result   Non-visualization of the gallbladder is compatible with acute   cholecystitis/cystic duct obstruction. CT ABDOMEN PELVIS W IV CONTRAST Additional Contrast? None   Final Result   Mild gallbladder distension. Correlate with right upper quadrant pain. Diffuse colonic fecal retention. Significant prostatomegaly. US GALLBLADDER RUQ   Final Result   Gallbladder sludge in an otherwise normal-appearing gallbladder. Normal   common bile duct. Hepatomegaly at 21.0 cm with diffuse fatty infiltration of the liver. 2.8 cm soft tissue density near the juan hepatis versus bowel. Assessment:    Principal Problem:    RLQ abdominal pain  Active Problems:    Obesity (BMI 30-39. 9)    Right upper quadrant abdominal pain  Resolved Problems:    * No resolved hospital problems. *      Plan:  Acute Cholecystitis -CT , US reviewed Has + HIDA scan. Continue IV Zosyn , IVF and other supportive rx. For OR today.     HTN - resume as per home    DMII - Metformin on hold, continue ISS    CAD s/p pci - had atypical CP on admission , CP has resolved , intial cardiac work up EKG , troponin  X 2 neg. Monitor. Hypothyroidism - on synthroid. as per home    On Lovenox for dvt prophy.     Full code                 Electronically signed by Rigoberto Sheldon MD on 6/3/2021 at 2:09 PM

## 2021-06-03 NOTE — ANESTHESIA PRE PROCEDURE
Department of Anesthesiology  Preprocedure Note       Name:  Jaxon Michael   Age:  73 y.o.  :  1950                                          MRN:  [id number]         Date:  6/3/2021      Surgeon: Ryan Oleary):  Kenney Lemus MD    Procedure: Procedure(s):  LAPAROSCOPIC ROBOTIC ASSISTED CHOLECYSTECTOMY POSSIBLE OPEN POSSIBLE GRAM    Medications prior to admission:   Prior to Admission medications    Medication Sig Start Date End Date Taking? Authorizing Provider   clopidogrel (PLAVIX) 75 MG tablet Take 1 tablet by mouth daily 20  Yes Damion Torres DO   hydrALAZINE (APRESOLINE) 25 MG tablet Take 1 tablet by mouth 3 times daily 10/31/19  Yes Damion Torres DO   Cholecalciferol (VITAMIN D3) 5000 units TABS Take by mouth   Yes Historical Provider, MD   Glucosamine HCl (GLUCOSAMINE MAXIMUM STRENGTH) 1500 MG TABS Take by mouth daily Indications: contains msm   Yes Historical Provider, MD   aspirin 81 MG EC tablet Take 1 tablet by mouth daily 19  Yes Tiny Robins MD   nitroGLYCERIN (NITROSTAT) 0.4 MG SL tablet up to max of 3 total doses.  If no relief after 1 dose, call 911. 19  Yes Tiny Robins MD   metoprolol succinate (TOPROL XL) 25 MG extended release tablet Take 0.5 tablets by mouth daily  Patient taking differently: Take 12.5 mg by mouth 2 times daily  19  Yes Tiny Robins MD   amLODIPine (NORVASC) 10 MG tablet Take 1 tablet by mouth daily 19  Yes Tiny Robins MD   lisinopril (PRINIVIL;ZESTRIL) 40 MG tablet Take 1 tablet by mouth daily 4/10/19  Yes Tiny Robins MD   levothyroxine (SYNTHROID) 25 MCG tablet Take 25 mcg by mouth Daily   Yes Historical Provider, MD   ketotifen ( KETOTIFEN FUMARATE) 0.025 % ophthalmic solution Place 1 drop into both eyes 2 times daily   Yes Historical Provider, MD   tamsulosin (FLOMAX) 0.4 MG capsule Take 0.8 mg by mouth daily   Yes Historical Provider, MD   ezetimibe (ZETIA) 10 MG tablet Take 10 mg by mouth daily Patient can not take regular statins Yes Historical Provider, MD   GLIPIZIDE PO Take 10 mg by mouth daily    Yes Historical Provider, MD   metFORMIN (GLUCOPHAGE) 1000 MG tablet Take 1,000 mg by mouth Daily with supper    Yes Historical Provider, MD       Current medications:    Current Facility-Administered Medications   Medication Dose Route Frequency Provider Last Rate Last Admin    indocyanine green (IC-GREEN) syringe 5 mg  5 mg Intravenous On Call to [address], DO        tamsulosin (FLOMAX) capsule 0.8 mg  0.8 mg Oral Daily Zaid Kendall MD   0.8 mg at 06/02/21 0839    nitroGLYCERIN (NITROSTAT) SL tablet 0.4 mg  0.4 mg Sublingual Q5 Min PRN Zaid Kendall MD        metoprolol succinate (TOPROL XL) extended release tablet 12.5 mg  12.5 mg Oral Daily Zaid Kendall MD   12.5 mg at 06/02/21 0839    lisinopril (PRINIVIL;ZESTRIL) tablet 40 mg  40 mg Oral Daily Zaid Kendall MD   40 mg at 06/02/21 0839    levothyroxine (SYNTHROID) tablet 25 mcg  25 mcg Oral Daily Zaid Kendall MD   25 mcg at 06/02/21 0516    hydrALAZINE (APRESOLINE) tablet 25 mg  25 mg Oral TID Zaid Kendall MD   25 mg at 06/02/21 2132    ezetimibe (ZETIA) tablet 10 mg  10 mg Oral Daily Zaid Kendall MD   10 mg at 06/02/21 0839    [Held by provider] aspirin EC tablet 81 mg  81 mg Oral Daily Zaid Kendall MD   81 mg at 06/02/21 0839    amLODIPine (NORVASC) tablet 10 mg  10 mg Oral Daily Zaid Kendall MD   10 mg at 06/02/21 0839    sodium chloride flush 0.9 % injection 5-40 mL  5-40 mL Intravenous 2 times per day Zaid Kendall MD        sodium chloride flush 0.9 % injection 5-40 mL  5-40 mL Intravenous PRN Zaid Kendall MD        0.9 % sodium chloride infusion  25 mL Intravenous PRN Zaid Kendall MD        enoxaparin (LOVENOX) injection 40 mg  40 mg Subcutaneous Daily Zaid Kendall MD   40 mg at 06/02/21 0839    ondansetron (ZOFRAN-ODT) disintegrating tablet 4 mg  4 mg Oral Q8H PRN Zaid Kendall MD        Or    ondansetron (ZOFRAN) injection 4 mg  4 mg Intravenous Q6H PRN Abhay Marie MD        polyethylene glycol (GLYCOLAX) packet 17 g  17 g Oral Daily PRN Abhay Marie MD        acetaminophen (TYLENOL) tablet 650 mg  650 mg Oral Q6H PRN Abhay Marie MD        Or    acetaminophen (TYLENOL) suppository 650 mg  650 mg Rectal Q6H PRN Abhay Marie MD        polyethylene glycol (GLYCOLAX) packet 17 g  17 g Oral BID Abhay Marie MD   17 g at 06/02/21 2132    piperacillin-tazobactam (ZOSYN) 3,375 mg in dextrose 5 % 50 mL IVPB extended infusion (mini-bag)  3,375 mg Intravenous Q8H Abhay Marie MD 12.5 mL/hr at 06/03/21 0323 3,375 mg at 06/03/21 0323    lactated ringers infusion   Intravenous Continuous Azul Guzman  mL/hr at 06/02/21 1945 Rate Verify at 06/02/21 1945    pi===Zosyn post-infusion flush===  25 mL Intravenous Q8H Abhay Marie MD   Stopped at 06/03/21 0034    insulin lispro (HUMALOG) injection vial 0-18 Units  0-18 Units Subcutaneous Q4H Azul Guzman MD   3 Units at 06/02/21 1840    insulin lispro (HUMALOG) injection vial 0-9 Units  0-9 Units Subcutaneous Nightly Azul Guzman MD        HYDROmorphone (DILAUDID) injection 0.5 mg  0.5 mg Intravenous Q3H PRN Azul Guzman MD   0.5 mg at 06/02/21 2132    Or    HYDROmorphone (DILAUDID) injection 1 mg  1 mg Intravenous Q3H PRN Azul Guzman MD   1 mg at 06/03/21 0200    glucose (GLUTOSE) 40 % oral gel 15 g  15 g Oral PRN Azul Guzman MD        dextrose 50 % IV solution  12.5 g Intravenous PRN Azul Guzman MD        glucagon (rDNA) injection 1 mg  1 mg Intramuscular PRN Azul Guzman MD        dextrose 5 % solution  100 mL/hr Intravenous PRN Azul Guzman MD        insulin glargine (LANTUS) injection vial 10 Units  10 Units Subcutaneous Nightly Azul Guzman MD           Allergies:     Allergies   Allergen Reactions    Statins      States he is intolerant        Problem List:    Patient Active Problem List   Diagnosis Code    Chest pain R07.9    Elevated lipase R74.8    Accelerated hypertension I10    Uncontrolled type 2 diabetes mellitus with hyperglycemia (HCC) E11.65    Incomplete right bundle branch block I45.10    Headache R51.9    Obesity (BMI 30-39. 9) E66.9    Class 1 obesity due to excess calories in adult E66.09    Chest pain due to myocardial ischemia I25.9    Unstable angina (HCC) I20.0    Coronary artery disease due to lipid rich plaque I25.10, I25.83    RLQ abdominal pain R10.31    Right upper quadrant abdominal pain R10.11       Past Medical History:        Diagnosis Date    Arthritis     Coronary artery disease due to lipid rich plaque 5/29/2019    Diabetes mellitus ([facility] [address]     Hypercholesteremia     Hypertension     Squamous cell carcinoma     Bottom lip had cancer cut off    Thyroid disease        Past Surgical History:        Procedure Laterality Date    CARDIAC SURGERY      may 2019 stents x2    CARPAL TUNNEL RELEASE      bilateral    CORONARY ANGIOPLASTY WITH STENT PLACEMENT  04/08/2019    Reso;Pala Macho 2.25 x15mm and resolute macho 2.25x8 to 1st diagonal artery by DR Aruna Bernal      for his cancer on his lower lip    FRACTURE SURGERY      Fractured left ankle times two    FRACTURE SURGERY      broke 2 transverse processes in his back    HEMORRHOID SURGERY      SHOULDER SURGERY      right    SKIN BIOPSY      TONSILLECTOMY         Social History:    Social History     Tobacco Use    Smoking status: Former Smoker    Smokeless tobacco: Former User     Quit date: 10/10/1988    Tobacco comment: Quit years ago when in [facility]   Substance Use Topics    Alcohol use:  No                                Counseling given: Not Answered  Comment: Quit years ago when in [address] [place] [address] Drive Signs (Current):   Vitals:    06/02/21 0230 06/02/21 0753 06/02/21 1930 06/03/21 0449   BP: (!) 168/77 (!) 150/72 128/78    Pulse: 94 105 80    Resp: 18 18 16    Temp: 98 °F (36.7 °C) 98.7 °F (37.1 °C) 98.2 °F (36.8 °C) TempSrc: Oral Oral Oral    SpO2: 93% 95% 92%    Weight: 202 lb (91.6 kg)   202 lb 8 oz (91.9 kg)   Height: 5' 9\" (1.753 m)                                                 BP Readings from Last 3 Encounters:   06/02/21 128/78   10/31/19 (!) 140/70   09/01/19 (!) 160/74       NPO Status: Time of last liquid consumption: 2300                        Time of last solid consumption: 1100                        Date of last liquid consumption: 06/02/21                        Date of last solid food consumption: 06/01/21    BMI:   Wt Readings from Last 3 Encounters:   06/03/21 202 lb 8 oz (91.9 kg)   10/31/19 209 lb (94.8 kg)   09/01/19 205 lb (93 kg)     Body mass index is 29.9 kg/m². CBC:   Lab Results   Component Value Date    WBC 13.9 06/03/2021    RBC 4.99 06/03/2021    HGB 13.9 06/03/2021    HCT 42.9 06/03/2021    MCV 86.0 06/03/2021    RDW 13.7 06/03/2021     06/03/2021       CMP:   Lab Results   Component Value Date     06/03/2021    K 3.7 06/03/2021     06/03/2021    CO2 22 06/03/2021    BUN 22 06/03/2021    CREATININE 1.1 06/03/2021    GFRAA >60 06/03/2021    LABGLOM >60 06/03/2021    GLUCOSE 158 06/03/2021    PROT 6.2 06/03/2021    CALCIUM 8.8 06/03/2021    BILITOT 1.1 06/03/2021    ALKPHOS 85 06/03/2021    AST 25 06/03/2021    ALT 31 06/03/2021       POC Tests: No results for input(s): POCGLU, POCNA, POCK, POCCL, POCBUN, POCHEMO, POCHCT in the last 72 hours.     Coags:   Lab Results   Component Value Date    PROTIME 19.7 06/03/2021    INR 1.8 06/03/2021    APTT 29.8 05/14/2019       HCG (If Applicable): No results found for: PREGTESTUR, PREGSERUM, HCG, HCGQUANT     ABGs: No results found for: PHART, PO2ART, JCW0IEI, FBQ4ZYW, BEART, [id number]     Type & Screen (If Applicable):  No results found for: LABABO, LABRH    Drug/Infectious Status (If Applicable):  No results found for: HIV, HEPCAB    COVID-19 Screening (If Applicable): No results found for: COVID19        Anesthesia Evaluation  Patient summary reviewed and Nursing notes reviewed no history of anesthetic complications:   Airway: Mallampati: III  TM distance: >3 FB   Neck ROM: full  Mouth opening: > = 3 FB Dental:    (+) upper dentures and partials      Pulmonary:Negative Pulmonary ROS breath sounds clear to auscultation      (-) not a current smoker                          ROS comment: O2 nc   Cardiovascular:  Exercise tolerance: good (>4 METS),   (+) hypertension:, CAD:, CABG/stent:,     (-)  angina    ECG reviewed  Rhythm: regular  Rate: normal           Beta Blocker:  Order written      ROS comment: - TROPONIN  HAS NOT HAD ANGINA FOR MONTHS  SEE NOTE [facility] AND MEDICINE     Neuro/Psych:   Negative Neuro/Psych ROS              GI/Hepatic/Renal:            ROS comment: BPH  CHOLELITHIASIS. Endo/Other:    (+) DiabetesType II DM, using insulin, blood dyscrasia: anticoagulation therapy, arthritis:., malignancy/cancer. Abdominal:           Vascular: negative vascular ROS. Anesthesia Plan      general     ASA 3       Induction: intravenous. Anesthetic plan and risks discussed with patient. Lupe Corrigan MD   6/3/2021      Patient will need to be re-evaluated prior to surgery by DOS anesthesiologist.    Lupe Corrigan MD           6/3/2021        5:53 AM    DOS STAFF ADDENDUM:    Patient seen and examined, physical exam updated as needed, chart reviewed. NPO status confirmed. Anesthetic options and risks discussed with patient/legal guardian. Patient/legal guardian verbalized understanding and agrees to proceed.      Samson Chavez MD  Staff Anesthesiologist  Radha 3, 2021  4:08 PM

## 2021-06-03 NOTE — CARE COORDINATION
Pt POD #1 lap hamilton; HIDA +; IV zosyn;pt independent PTA;wbc 13,900. Plan is home at discharge, no needs. Beau Ashford.

## 2021-06-03 NOTE — PROGRESS NOTES
Chart reviewed.   Plans for laparoscopic/robotic cholecystectomy today    Electronically signed by Cecily Manning MD on 6/3/2021 at 10:14 AM

## 2021-06-04 LAB
ALBUMIN SERPL-MCNC: 2.7 G/DL (ref 3.5–5.2)
ALP BLD-CCNC: 96 U/L (ref 40–129)
ALT SERPL-CCNC: 24 U/L (ref 0–40)
ANION GAP SERPL CALCULATED.3IONS-SCNC: 9 MMOL/L (ref 7–16)
AST SERPL-CCNC: 27 U/L (ref 0–39)
BASOPHILS ABSOLUTE: 0.02 E9/L (ref 0–0.2)
BASOPHILS RELATIVE PERCENT: 0.2 % (ref 0–2)
BILIRUB SERPL-MCNC: 0.6 MG/DL (ref 0–1.2)
BUN BLDV-MCNC: 19 MG/DL (ref 6–23)
BURR CELLS: ABNORMAL
CALCIUM SERPL-MCNC: 8.6 MG/DL (ref 8.6–10.2)
CHLORIDE BLD-SCNC: 104 MMOL/L (ref 98–107)
CO2: 23 MMOL/L (ref 22–29)
CREAT SERPL-MCNC: 1 MG/DL (ref 0.7–1.2)
EOSINOPHILS ABSOLUTE: 0.02 E9/L (ref 0.05–0.5)
EOSINOPHILS RELATIVE PERCENT: 0.2 % (ref 0–6)
GFR AFRICAN AMERICAN: >60
GFR NON-AFRICAN AMERICAN: >60 ML/MIN/1.73
GLUCOSE BLD-MCNC: 172 MG/DL (ref 74–99)
HCT VFR BLD CALC: 36.6 % (ref 37–54)
HEMOGLOBIN: 12.3 G/DL (ref 12.5–16.5)
IMMATURE GRANULOCYTES #: 0.06 E9/L
IMMATURE GRANULOCYTES %: 0.5 % (ref 0–5)
LIPASE: 11 U/L (ref 13–60)
LYMPHOCYTES ABSOLUTE: 0.71 E9/L (ref 1.5–4)
LYMPHOCYTES RELATIVE PERCENT: 6 % (ref 20–42)
MAGNESIUM: 2.1 MG/DL (ref 1.6–2.6)
MCH RBC QN AUTO: 28.2 PG (ref 26–35)
MCHC RBC AUTO-ENTMCNC: 33.6 % (ref 32–34.5)
MCV RBC AUTO: 83.9 FL (ref 80–99.9)
METER GLUCOSE: 133 MG/DL (ref 74–99)
METER GLUCOSE: 147 MG/DL (ref 74–99)
METER GLUCOSE: 153 MG/DL (ref 74–99)
METER GLUCOSE: 164 MG/DL (ref 74–99)
METER GLUCOSE: 209 MG/DL (ref 74–99)
MONOCYTES ABSOLUTE: 0.52 E9/L (ref 0.1–0.95)
MONOCYTES RELATIVE PERCENT: 4.4 % (ref 2–12)
MRSA CULTURE ONLY: NORMAL
NEUTROPHILS ABSOLUTE: 10.58 E9/L (ref 1.8–7.3)
NEUTROPHILS RELATIVE PERCENT: 88.7 % (ref 43–80)
OVALOCYTES: ABNORMAL
PDW BLD-RTO: 13.8 FL (ref 11.5–15)
PLATELET # BLD: 235 E9/L (ref 130–450)
PMV BLD AUTO: 9.9 FL (ref 7–12)
POIKILOCYTES: ABNORMAL
POTASSIUM REFLEX MAGNESIUM: 3.4 MMOL/L (ref 3.5–5)
RBC # BLD: 4.36 E12/L (ref 3.8–5.8)
SODIUM BLD-SCNC: 136 MMOL/L (ref 132–146)
TOTAL PROTEIN: 6.1 G/DL (ref 6.4–8.3)
WBC # BLD: 11.9 E9/L (ref 4.5–11.5)

## 2021-06-04 PROCEDURE — 99232 SBSQ HOSP IP/OBS MODERATE 35: CPT | Performed by: INTERNAL MEDICINE

## 2021-06-04 PROCEDURE — 6370000000 HC RX 637 (ALT 250 FOR IP): Performed by: INTERNAL MEDICINE

## 2021-06-04 PROCEDURE — 2700000000 HC OXYGEN THERAPY PER DAY

## 2021-06-04 PROCEDURE — 1200000000 HC SEMI PRIVATE

## 2021-06-04 PROCEDURE — 6360000002 HC RX W HCPCS: Performed by: STUDENT IN AN ORGANIZED HEALTH CARE EDUCATION/TRAINING PROGRAM

## 2021-06-04 PROCEDURE — 83735 ASSAY OF MAGNESIUM: CPT

## 2021-06-04 PROCEDURE — 83690 ASSAY OF LIPASE: CPT

## 2021-06-04 PROCEDURE — 2580000003 HC RX 258: Performed by: STUDENT IN AN ORGANIZED HEALTH CARE EDUCATION/TRAINING PROGRAM

## 2021-06-04 PROCEDURE — 82962 GLUCOSE BLOOD TEST: CPT

## 2021-06-04 PROCEDURE — 6370000000 HC RX 637 (ALT 250 FOR IP): Performed by: STUDENT IN AN ORGANIZED HEALTH CARE EDUCATION/TRAINING PROGRAM

## 2021-06-04 PROCEDURE — 80053 COMPREHEN METABOLIC PANEL: CPT

## 2021-06-04 PROCEDURE — 36415 COLL VENOUS BLD VENIPUNCTURE: CPT

## 2021-06-04 PROCEDURE — 85025 COMPLETE CBC W/AUTO DIFF WBC: CPT

## 2021-06-04 RX ORDER — MAGNESIUM HYDROXIDE/ALUMINUM HYDROXICE/SIMETHICONE 120; 1200; 1200 MG/30ML; MG/30ML; MG/30ML
30 SUSPENSION ORAL EVERY 8 HOURS PRN
Status: DISCONTINUED | OUTPATIENT
Start: 2021-06-04 | End: 2021-06-06

## 2021-06-04 RX ORDER — POLYETHYLENE GLYCOL 3350 17 G/17G
17 POWDER, FOR SOLUTION ORAL DAILY PRN
Qty: 527 G | Refills: 1 | Status: SHIPPED | OUTPATIENT
Start: 2021-06-04 | End: 2021-07-04

## 2021-06-04 RX ORDER — OXYCODONE HYDROCHLORIDE AND ACETAMINOPHEN 5; 325 MG/1; MG/1
1 TABLET ORAL EVERY 6 HOURS PRN
Qty: 28 TABLET | Refills: 0 | Status: SHIPPED | OUTPATIENT
Start: 2021-06-04 | End: 2021-06-11

## 2021-06-04 RX ADMIN — SODIUM CHLORIDE 25 ML: 9 INJECTION, SOLUTION INTRAVENOUS at 08:18

## 2021-06-04 RX ADMIN — HYDRALAZINE HYDROCHLORIDE 25 MG: 25 TABLET, FILM COATED ORAL at 21:23

## 2021-06-04 RX ADMIN — PIPERACILLIN AND TAZOBACTAM 3375 MG: 3; .375 INJECTION, POWDER, LYOPHILIZED, FOR SOLUTION INTRAVENOUS at 11:51

## 2021-06-04 RX ADMIN — INSULIN LISPRO 3 UNITS: 100 INJECTION, SOLUTION INTRAVENOUS; SUBCUTANEOUS at 15:58

## 2021-06-04 RX ADMIN — POLYETHYLENE GLYCOL 3350 17 G: 17 POWDER, FOR SOLUTION ORAL at 21:22

## 2021-06-04 RX ADMIN — METOPROLOL SUCCINATE 12.5 MG: 25 TABLET, EXTENDED RELEASE ORAL at 09:49

## 2021-06-04 RX ADMIN — HYDRALAZINE HYDROCHLORIDE 25 MG: 25 TABLET, FILM COATED ORAL at 13:58

## 2021-06-04 RX ADMIN — PIPERACILLIN AND TAZOBACTAM 3375 MG: 3; .375 INJECTION, POWDER, LYOPHILIZED, FOR SOLUTION INTRAVENOUS at 21:22

## 2021-06-04 RX ADMIN — POLYETHYLENE GLYCOL 3350 17 G: 17 POWDER, FOR SOLUTION ORAL at 09:53

## 2021-06-04 RX ADMIN — SODIUM CHLORIDE 25 ML: 9 INJECTION, SOLUTION INTRAVENOUS at 14:56

## 2021-06-04 RX ADMIN — AMLODIPINE BESYLATE 10 MG: 10 TABLET ORAL at 09:48

## 2021-06-04 RX ADMIN — EZETIMIBE 10 MG: 10 TABLET ORAL at 09:48

## 2021-06-04 RX ADMIN — SODIUM CHLORIDE 25 ML: 9 INJECTION, SOLUTION INTRAVENOUS at 23:30

## 2021-06-04 RX ADMIN — ALUMINUM HYDROXIDE, MAGNESIUM HYDROXIDE, AND SIMETHICONE 30 ML: 200; 200; 20 SUSPENSION ORAL at 11:00

## 2021-06-04 RX ADMIN — INSULIN GLARGINE 10 UNITS: 100 INJECTION, SOLUTION SUBCUTANEOUS at 21:25

## 2021-06-04 RX ADMIN — LISINOPRIL 40 MG: 20 TABLET ORAL at 09:53

## 2021-06-04 RX ADMIN — HYDRALAZINE HYDROCHLORIDE 25 MG: 25 TABLET, FILM COATED ORAL at 09:49

## 2021-06-04 RX ADMIN — TAMSULOSIN HYDROCHLORIDE 0.8 MG: 0.4 CAPSULE ORAL at 09:48

## 2021-06-04 RX ADMIN — INSULIN LISPRO 6 UNITS: 100 INJECTION, SOLUTION INTRAVENOUS; SUBCUTANEOUS at 11:48

## 2021-06-04 RX ADMIN — ALUMINUM HYDROXIDE, MAGNESIUM HYDROXIDE, AND SIMETHICONE 30 ML: 200; 200; 20 SUSPENSION ORAL at 21:22

## 2021-06-04 RX ADMIN — INSULIN LISPRO 3 UNITS: 100 INJECTION, SOLUTION INTRAVENOUS; SUBCUTANEOUS at 03:28

## 2021-06-04 RX ADMIN — ACETAMINOPHEN 650 MG: 325 TABLET ORAL at 22:10

## 2021-06-04 RX ADMIN — LEVOTHYROXINE SODIUM 25 MCG: 25 TABLET ORAL at 05:59

## 2021-06-04 RX ADMIN — PIPERACILLIN AND TAZOBACTAM 3375 MG: 3; .375 INJECTION, POWDER, LYOPHILIZED, FOR SOLUTION INTRAVENOUS at 03:32

## 2021-06-04 RX ADMIN — ASPIRIN 81 MG: 81 TABLET, COATED ORAL at 09:48

## 2021-06-04 RX ADMIN — SODIUM CHLORIDE, PRESERVATIVE FREE 10 ML: 5 INJECTION INTRAVENOUS at 21:29

## 2021-06-04 ASSESSMENT — PAIN SCALES - GENERAL
PAINLEVEL_OUTOF10: 3
PAINLEVEL_OUTOF10: 0

## 2021-06-04 ASSESSMENT — PAIN DESCRIPTION - LOCATION: LOCATION: ABDOMEN

## 2021-06-04 ASSESSMENT — PAIN DESCRIPTION - FREQUENCY: FREQUENCY: INTERMITTENT

## 2021-06-04 ASSESSMENT — PAIN DESCRIPTION - ONSET: ONSET: ON-GOING

## 2021-06-04 ASSESSMENT — PAIN DESCRIPTION - DESCRIPTORS: DESCRIPTORS: ACHING;DISCOMFORT;SORE

## 2021-06-04 ASSESSMENT — PAIN DESCRIPTION - PAIN TYPE: TYPE: SURGICAL PAIN

## 2021-06-04 NOTE — CARE COORDINATION
Pt with JOSY drain; POD #2; had somewhat necrotic GB;iv zosyn; pt independent pta admission. Should pt discharge with JOSY drain, staff can instruct pt on drain care if appropriate. Lukasz Murphy.

## 2021-06-04 NOTE — PROGRESS NOTES
[facility]   Progress Note    Admitting Date and Time: 6/1/2021  7:30 PM  Admit Dx: RLQ abdominal pain [R10.31]     Seen for follow on Rt abdominal pain / Acute cholecystitis / now s/p lap subtotal cholecystectomy with drain    Subjective:  Rt abdominal pain has improved , has post op soreness and nausea. No vomiting. Denies CP or sob. Family at bedside & updated. ROS: denies cp, sob, HA unless stated above.      indocyanine green  5 mg Intravenous See Admin Instructions    tamsulosin  0.8 mg Oral Daily    metoprolol succinate  12.5 mg Oral Daily    lisinopril  40 mg Oral Daily    levothyroxine  25 mcg Oral Daily    hydrALAZINE  25 mg Oral TID    ezetimibe  10 mg Oral Daily    aspirin  81 mg Oral Daily    amLODIPine  10 mg Oral Daily    sodium chloride flush  5-40 mL Intravenous 2 times per day    enoxaparin  40 mg Subcutaneous Daily    polyethylene glycol  17 g Oral BID    piperacillin-tazobactam  3,375 mg Intravenous Q8H    insulin lispro  0-18 Units Subcutaneous Q4H    insulin lispro  0-9 Units Subcutaneous Nightly    insulin glargine  10 Units Subcutaneous Nightly     nitroGLYCERIN, 0.4 mg, Q5 Min PRN  sodium chloride flush, 5-40 mL, PRN  sodium chloride, 25 mL, PRN  ondansetron, 4 mg, Q8H PRN   Or  ondansetron, 4 mg, Q6H PRN  polyethylene glycol, 17 g, Daily PRN  acetaminophen, 650 mg, Q6H PRN   Or  acetaminophen, 650 mg, Q6H PRN  HYDROmorphone, 0.5 mg, Q3H PRN   Or  HYDROmorphone, 1 mg, Q3H PRN  glucose, 15 g, PRN  dextrose, 12.5 g, PRN  glucagon (rDNA), 1 mg, PRN  dextrose, 100 mL/hr, PRN         Objective:    /68   Pulse 89   Temp 98.2 °F (36.8 °C) (Oral)   Resp 16   Ht 5' 9\" (1.753 m)   Wt 218 lb 7.6 oz (99.1 kg)   SpO2 93%   BMI 32.26 kg/m²   General Appearance: alert and oriented to person, place and time, in no acute distress, Uncomfortable sec to pain  Skin: warm and dry  Head: normocephalic and atraumatic  Eyes: pupils equal, round, and reactive to light, extraocular eye movements intact, conjunctivae normal  Neck: supple and non-tender without mass  Pulmonary/Chest: clear to auscultation bilaterally  Cardiovascular: normal rate, regular rhythm, normal S1 and S2  Abdomen: soft,post op soreness RUQ , drain with bloody serous drainage, normal bowel sounds, no masses or organomegaly  Extremities: no cyanosis, clubbing Musculoskeletal: normal range of motion  Neurologic:  no cranial nerve deficit,speech normal      Recent Labs     06/01/21 2032 06/02/21  0338 06/03/21  0327    135 135   K 3.9 4.1 3.7    102 103   CO2 22 21* 22   BUN 23 19 22   CREATININE 1.0 0.8 1.1   GLUCOSE 203* 255* 158*   CALCIUM 8.8 8.8 8.8       Recent Labs     06/01/21 2032 06/03/21  0327   WBC 14.4* 13.9*   RBC 5.17 4.99   HGB 14.7 13.9   HCT 43.3 42.9   MCV 83.8 86.0   MCH 28.4 27.9   MCHC 33.9 32.4   RDW 13.2 13.7    245   MPV 9.5 9.6       Labs and images reviewed    Radiology:   NM HEPATOBILIARY SCAN W EJECTION FRACTION   Final Result   Non-visualization of the gallbladder is compatible with acute   cholecystitis/cystic duct obstruction. CT ABDOMEN PELVIS W IV CONTRAST Additional Contrast? None   Final Result   Mild gallbladder distension. Correlate with right upper quadrant pain. Diffuse colonic fecal retention. Significant prostatomegaly. US GALLBLADDER RUQ   Final Result   Gallbladder sludge in an otherwise normal-appearing gallbladder. Normal   common bile duct. Hepatomegaly at 21.0 cm with diffuse fatty infiltration of the liver. 2.8 cm soft tissue density near the juan hepatis versus bowel. Assessment:    Principal Problem:    RLQ abdominal pain  Active Problems:    Obesity (BMI 30-39. 9)    Right upper quadrant abdominal pain  Resolved Problems:    * No resolved hospital problems. *      Plan:  Acute Cholecystitis -CT , US reviewed Has + HIDA scan. Continue IV Zosyn , IVF and other supportive rx. S/p subtotal lap hamilton with drain on 6/3/21 as above. Continue post op care as per surg. HTN - resume as per home    DMII - Metformin on hold, continue ISS    CAD s/p pci - had atypical CP on admission , CP has resolved , intial cardiac work up EKG , troponin  X 2 neg. Monitor. Hypothyroidism - on synthroid. as per home    On Lovenox for dvt prophy.     Full code                 Electronically signed by Nabil Stokes MD on 6/4/2021 at 9:18 AM

## 2021-06-04 NOTE — PROGRESS NOTES
GENERAL SURGERY  DAILY PROGRESS NOTE  6/4/2021  Chief Complaint   Patient presents with    Chest Pain     midsternal. hx MI 2 yrs ago/stents    Abdominal Pain     epigastric pain, onset 1 hr ago. +Nausea       Subjective:  Pain significantly improved. JOSY: 65ss    Objective:  BP (!) 141/71   Pulse 92   Temp 98.2 °F (36.8 °C) (Temporal)   Resp 18   Ht 5' 9\" (1.753 m)   Wt 218 lb 7.6 oz (99.1 kg)   SpO2 94%   BMI 32.26 kg/m²     GENERAL:  Laying in bed, awake, alert, cooperative, no apparent distress  HEAD: Normocephalic, atraumatic  EYES: No sclera icterus, pupils equal  LUNGS:  No increased work of breathing  CARDIOVASCULAR:  RR  ABDOMEN:  Soft, mild tender, non-distended, incisions c/d/i  EXTREMITIES: No edema or swelling  SKIN: Warm and dry    Assessment/Plan:  73 y.o. male with necrotic gallbladder s/p subtotal cholecystectomy with drain placement    Advance to Low fat diet  Decrease IVF  Pain control  Continue JOSY to bulb   DVT ppx mechanical and Lovenox  IV ABx      Electronically signed by Marco A Kemp MD on 6/4/2021 at 6:16 AM     The patient was seen and examined and the chart was reviewed. Again, the patient underwent a subtotal cholecystectomy for a gangrenous and necrotic gallbladder. Overall, the patient is doing well. The JOSY drain is minimal.  The examination is slightly distended with minimal tympany.     Assessment:    Necrotic/gangrenous gallbladder    Plan:    Continue antibiotics  Advance diet  Continue drain

## 2021-06-05 LAB
ALBUMIN SERPL-MCNC: 2.6 G/DL (ref 3.5–5.2)
ALP BLD-CCNC: 98 U/L (ref 40–129)
ALT SERPL-CCNC: 25 U/L (ref 0–40)
ANION GAP SERPL CALCULATED.3IONS-SCNC: 8 MMOL/L (ref 7–16)
AST SERPL-CCNC: 26 U/L (ref 0–39)
BASOPHILS ABSOLUTE: 0.01 E9/L (ref 0–0.2)
BASOPHILS RELATIVE PERCENT: 0.1 % (ref 0–2)
BILIRUB SERPL-MCNC: 0.7 MG/DL (ref 0–1.2)
BUN BLDV-MCNC: 20 MG/DL (ref 6–23)
BURR CELLS: ABNORMAL
CALCIUM SERPL-MCNC: 8.1 MG/DL (ref 8.6–10.2)
CHLORIDE BLD-SCNC: 103 MMOL/L (ref 98–107)
CO2: 25 MMOL/L (ref 22–29)
CREAT SERPL-MCNC: 1.1 MG/DL (ref 0.7–1.2)
EOSINOPHILS ABSOLUTE: 0.17 E9/L (ref 0.05–0.5)
EOSINOPHILS RELATIVE PERCENT: 1.6 % (ref 0–6)
GFR AFRICAN AMERICAN: >60
GFR NON-AFRICAN AMERICAN: >60 ML/MIN/1.73
GLUCOSE BLD-MCNC: 148 MG/DL (ref 74–99)
HCT VFR BLD CALC: 35.3 % (ref 37–54)
HEMOGLOBIN: 11.6 G/DL (ref 12.5–16.5)
IMMATURE GRANULOCYTES #: 0.07 E9/L
IMMATURE GRANULOCYTES %: 0.7 % (ref 0–5)
LIPASE: 17 U/L (ref 13–60)
LYMPHOCYTES ABSOLUTE: 1.11 E9/L (ref 1.5–4)
LYMPHOCYTES RELATIVE PERCENT: 10.5 % (ref 20–42)
MAGNESIUM: 2.2 MG/DL (ref 1.6–2.6)
MCH RBC QN AUTO: 28 PG (ref 26–35)
MCHC RBC AUTO-ENTMCNC: 32.9 % (ref 32–34.5)
MCV RBC AUTO: 85.1 FL (ref 80–99.9)
METER GLUCOSE: 140 MG/DL (ref 74–99)
METER GLUCOSE: 154 MG/DL (ref 74–99)
METER GLUCOSE: 158 MG/DL (ref 74–99)
METER GLUCOSE: 180 MG/DL (ref 74–99)
METER GLUCOSE: 186 MG/DL (ref 74–99)
MONOCYTES ABSOLUTE: 0.61 E9/L (ref 0.1–0.95)
MONOCYTES RELATIVE PERCENT: 5.7 % (ref 2–12)
NEUTROPHILS ABSOLUTE: 8.64 E9/L (ref 1.8–7.3)
NEUTROPHILS RELATIVE PERCENT: 81.4 % (ref 43–80)
OVALOCYTES: ABNORMAL
PDW BLD-RTO: 14.1 FL (ref 11.5–15)
PLATELET # BLD: 251 E9/L (ref 130–450)
PMV BLD AUTO: 10.1 FL (ref 7–12)
POIKILOCYTES: ABNORMAL
POLYCHROMASIA: ABNORMAL
POTASSIUM REFLEX MAGNESIUM: 3.5 MMOL/L (ref 3.5–5)
RBC # BLD: 4.15 E12/L (ref 3.8–5.8)
SODIUM BLD-SCNC: 136 MMOL/L (ref 132–146)
TOTAL PROTEIN: 5.9 G/DL (ref 6.4–8.3)
WBC # BLD: 10.6 E9/L (ref 4.5–11.5)

## 2021-06-05 PROCEDURE — 6370000000 HC RX 637 (ALT 250 FOR IP): Performed by: STUDENT IN AN ORGANIZED HEALTH CARE EDUCATION/TRAINING PROGRAM

## 2021-06-05 PROCEDURE — 99232 SBSQ HOSP IP/OBS MODERATE 35: CPT | Performed by: INTERNAL MEDICINE

## 2021-06-05 PROCEDURE — 83735 ASSAY OF MAGNESIUM: CPT

## 2021-06-05 PROCEDURE — 2580000003 HC RX 258: Performed by: STUDENT IN AN ORGANIZED HEALTH CARE EDUCATION/TRAINING PROGRAM

## 2021-06-05 PROCEDURE — 82962 GLUCOSE BLOOD TEST: CPT

## 2021-06-05 PROCEDURE — 6370000000 HC RX 637 (ALT 250 FOR IP): Performed by: INTERNAL MEDICINE

## 2021-06-05 PROCEDURE — 83690 ASSAY OF LIPASE: CPT

## 2021-06-05 PROCEDURE — 36415 COLL VENOUS BLD VENIPUNCTURE: CPT

## 2021-06-05 PROCEDURE — 6370000000 HC RX 637 (ALT 250 FOR IP): Performed by: SURGERY

## 2021-06-05 PROCEDURE — 80053 COMPREHEN METABOLIC PANEL: CPT

## 2021-06-05 PROCEDURE — 1200000000 HC SEMI PRIVATE

## 2021-06-05 PROCEDURE — 6360000002 HC RX W HCPCS: Performed by: STUDENT IN AN ORGANIZED HEALTH CARE EDUCATION/TRAINING PROGRAM

## 2021-06-05 PROCEDURE — 85025 COMPLETE CBC W/AUTO DIFF WBC: CPT

## 2021-06-05 RX ORDER — HYDROCODONE BITARTRATE AND ACETAMINOPHEN 5; 325 MG/1; MG/1
1 TABLET ORAL EVERY 6 HOURS PRN
Status: DISCONTINUED | OUTPATIENT
Start: 2021-06-05 | End: 2021-06-06

## 2021-06-05 RX ADMIN — SODIUM CHLORIDE, PRESERVATIVE FREE 10 ML: 5 INJECTION INTRAVENOUS at 09:32

## 2021-06-05 RX ADMIN — HYDRALAZINE HYDROCHLORIDE 25 MG: 25 TABLET, FILM COATED ORAL at 09:30

## 2021-06-05 RX ADMIN — SODIUM CHLORIDE, PRESERVATIVE FREE 10 ML: 5 INJECTION INTRAVENOUS at 20:43

## 2021-06-05 RX ADMIN — AMLODIPINE BESYLATE 10 MG: 10 TABLET ORAL at 09:30

## 2021-06-05 RX ADMIN — ASPIRIN 81 MG: 81 TABLET, COATED ORAL at 09:29

## 2021-06-05 RX ADMIN — LISINOPRIL 40 MG: 20 TABLET ORAL at 09:30

## 2021-06-05 RX ADMIN — ENOXAPARIN SODIUM 40 MG: 40 INJECTION SUBCUTANEOUS at 09:31

## 2021-06-05 RX ADMIN — INSULIN LISPRO 3 UNITS: 100 INJECTION, SOLUTION INTRAVENOUS; SUBCUTANEOUS at 12:29

## 2021-06-05 RX ADMIN — PIPERACILLIN AND TAZOBACTAM 3375 MG: 3; .375 INJECTION, POWDER, LYOPHILIZED, FOR SOLUTION INTRAVENOUS at 20:30

## 2021-06-05 RX ADMIN — SODIUM CHLORIDE 25 ML: 9 INJECTION, SOLUTION INTRAVENOUS at 16:08

## 2021-06-05 RX ADMIN — EZETIMIBE 10 MG: 10 TABLET ORAL at 09:38

## 2021-06-05 RX ADMIN — HYDRALAZINE HYDROCHLORIDE 25 MG: 25 TABLET, FILM COATED ORAL at 20:31

## 2021-06-05 RX ADMIN — PIPERACILLIN AND TAZOBACTAM 3375 MG: 3; .375 INJECTION, POWDER, LYOPHILIZED, FOR SOLUTION INTRAVENOUS at 12:28

## 2021-06-05 RX ADMIN — SODIUM CHLORIDE 25 ML: 9 INJECTION, SOLUTION INTRAVENOUS at 08:58

## 2021-06-05 RX ADMIN — INSULIN LISPRO 3 UNITS: 100 INJECTION, SOLUTION INTRAVENOUS; SUBCUTANEOUS at 17:56

## 2021-06-05 RX ADMIN — POLYETHYLENE GLYCOL 3350 17 G: 17 POWDER, FOR SOLUTION ORAL at 09:31

## 2021-06-05 RX ADMIN — INSULIN LISPRO 2 UNITS: 100 INJECTION, SOLUTION INTRAVENOUS; SUBCUTANEOUS at 20:31

## 2021-06-05 RX ADMIN — INSULIN GLARGINE 10 UNITS: 100 INJECTION, SOLUTION SUBCUTANEOUS at 20:32

## 2021-06-05 RX ADMIN — PIPERACILLIN AND TAZOBACTAM 3375 MG: 3; .375 INJECTION, POWDER, LYOPHILIZED, FOR SOLUTION INTRAVENOUS at 03:39

## 2021-06-05 RX ADMIN — POLYETHYLENE GLYCOL 3350 17 G: 17 POWDER, FOR SOLUTION ORAL at 20:31

## 2021-06-05 RX ADMIN — LEVOTHYROXINE SODIUM 25 MCG: 25 TABLET ORAL at 05:46

## 2021-06-05 RX ADMIN — HYDRALAZINE HYDROCHLORIDE 25 MG: 25 TABLET, FILM COATED ORAL at 12:29

## 2021-06-05 RX ADMIN — TAMSULOSIN HYDROCHLORIDE 0.8 MG: 0.4 CAPSULE ORAL at 09:30

## 2021-06-05 RX ADMIN — METOPROLOL SUCCINATE 12.5 MG: 25 TABLET, EXTENDED RELEASE ORAL at 09:38

## 2021-06-05 ASSESSMENT — PAIN SCALES - GENERAL
PAINLEVEL_OUTOF10: 0
PAINLEVEL_OUTOF10: 0

## 2021-06-05 NOTE — PROGRESS NOTES
Department of Surgery - Adult  General Surgery  Dr. Vishal Marsh's Progress Note      SUBJECTIVE: The patient states that his pain is improved. The patient had a significant number of bowel movements yesterday    OBJECTIVE      Physical    VITALS:  BP (!) 123/56   Pulse 98   Temp 98 °F (36.7 °C) (Temporal)   Resp 18   Ht 5' 9\" (1.753 m)   Wt 218 lb 7.6 oz (99.1 kg)   SpO2 94%   BMI 32.26 kg/m²   INTAKE/OUTPUT:      Intake/Output Summary (Last 24 hours) at 2021 0721  Last data filed at 2021 0428  Gross per 24 hour   Intake 600 ml   Output 30 ml   Net 570 ml     TEMPERATURE:  Current - Temp: 98 °F (36.7 °C); Max - Temp  Av.2 °F (36.8 °C)  Min: 97.9 °F (36.6 °C)  Max: 98.8 °F (37.1 °C)  RESPIRATIONS RANGE: Resp  Av.5  Min: 16  Max: 18  PULSE RANGE: Pulse  Av  Min: 84  Max: 98  BLOOD PRESSURE RANGE:  Systolic (36EVI), CDK:618 , Min:114 , WWI:845   ; Diastolic (81WMG), ENJ:44, Min:56, Max:68    PULSE OXIMETRY RANGE: SpO2  Av %  Min: 92 %  Max: 94 %  CONSTITUTIONAL:  awake, alert, cooperative, no apparent distress, and appears stated age  ABDOMEN: The abdomen is soft, nondistended with appropriate incisional tenderness without guarding or rebound. The JOSY drain has serosanguineous discharge.   Data  CBC with Differential:    Lab Results   Component Value Date    WBC 10.6 2021    RBC 4.15 2021    HGB 11.6 2021    HCT 35.3 2021     2021    MCV 85.1 2021    MCH 28.0 2021    MCHC 32.9 2021    RDW 14.1 2021    LYMPHOPCT 6.0 2021    MONOPCT 4.4 2021    BASOPCT 0.2 2021    MONOSABS 0.52 2021    LYMPHSABS 0.71 2021    EOSABS 0.02 2021    BASOSABS 0.02 2021     CMP:    Lab Results   Component Value Date     2021    K 3.4 2021     2021    CO2 23 2021    BUN 19 2021    CREATININE 1.0 2021    GFRAA >60 2021    LABGLOM >60 2021    GLUCOSE 172 06/04/2021    PROT 6.1 06/04/2021    LABALBU 2.7 06/04/2021    CALCIUM 8.6 06/04/2021    BILITOT 0.6 06/04/2021    ALKPHOS 96 06/04/2021    AST 27 06/04/2021    ALT 24 06/04/2021     BMP:  Hepatic Function Panel:  Ionized Calcium:  No results found for: IONCA  Magnesium:    Lab Results   Component Value Date    MG 2.1 06/04/2021     Phosphorus:  No results found for: PHOS    Current Inpatient Medications    Current Facility-Administered Medications: HYDROcodone-acetaminophen (NORCO) 5-325 MG per tablet 1 tablet, 1 tablet, Oral, Q6H PRN  aluminum & magnesium hydroxide-simethicone (MAALOX) 200-200-20 MG/5ML suspension 30 mL, 30 mL, Oral, Q8H PRN  insulin lispro (HUMALOG) injection vial 0-18 Units, 0-18 Units, Subcutaneous, TID WC  tamsulosin (FLOMAX) capsule 0.8 mg, 0.8 mg, Oral, Daily  nitroGLYCERIN (NITROSTAT) SL tablet 0.4 mg, 0.4 mg, Sublingual, Q5 Min PRN  metoprolol succinate (TOPROL XL) extended release tablet 12.5 mg, 12.5 mg, Oral, Daily  lisinopril (PRINIVIL;ZESTRIL) tablet 40 mg, 40 mg, Oral, Daily  levothyroxine (SYNTHROID) tablet 25 mcg, 25 mcg, Oral, Daily  hydrALAZINE (APRESOLINE) tablet 25 mg, 25 mg, Oral, TID  ezetimibe (ZETIA) tablet 10 mg, 10 mg, Oral, Daily  aspirin EC tablet 81 mg, 81 mg, Oral, Daily  amLODIPine (NORVASC) tablet 10 mg, 10 mg, Oral, Daily  sodium chloride flush 0.9 % injection 5-40 mL, 5-40 mL, Intravenous, 2 times per day  sodium chloride flush 0.9 % injection 5-40 mL, 5-40 mL, Intravenous, PRN  enoxaparin (LOVENOX) injection 40 mg, 40 mg, Subcutaneous, Daily  ondansetron (ZOFRAN-ODT) disintegrating tablet 4 mg, 4 mg, Oral, Q8H PRN **OR** ondansetron (ZOFRAN) injection 4 mg, 4 mg, Intravenous, Q6H PRN  polyethylene glycol (GLYCOLAX) packet 17 g, 17 g, Oral, Daily PRN  acetaminophen (TYLENOL) tablet 650 mg, 650 mg, Oral, Q6H PRN **OR** acetaminophen (TYLENOL) suppository 650 mg, 650 mg, Rectal, Q6H PRN  polyethylene glycol (GLYCOLAX) packet 17 g, 17 g, Oral, BID  piperacillin-tazobactam (ZOSYN) 3,375 mg in dextrose 5 % 50 mL IVPB extended infusion (mini-bag), 3,375 mg, Intravenous, Q8H  pi===Zosyn post-infusion flush===, 25 mL, Intravenous, Q8H  insulin lispro (HUMALOG) injection vial 0-9 Units, 0-9 Units, Subcutaneous, Nightly  glucose (GLUTOSE) 40 % oral gel 15 g, 15 g, Oral, PRN  dextrose 50 % IV solution, 12.5 g, Intravenous, PRN  glucagon (rDNA) injection 1 mg, 1 mg, Intramuscular, PRN  dextrose 5 % solution, 100 mL/hr, Intravenous, PRN  insulin glargine (LANTUS) injection vial 10 Units, 10 Units, Subcutaneous, Nightly    ASSESSMENT:    73 y.o. male status post robotic assisted laparoscopic cholecystectomy on 6/3/2021 with a subtotal cholecystectomy with placement of a drain. The patient had a gangrenous and necrotic gallbladder. The patient is tolerating a diet and having bowel function. The patient's leukocytosis is improving. PLAN:    24 hours of additional IV Zosyn. Continue pulmonary toilet  Discharge tomorrow on total of 10 days of antibiotics.     Ainsley Puga MD 6/5/20217:21 AM

## 2021-06-05 NOTE — PLAN OF CARE
Problem: Pain:  Goal: Pain level will decrease  Description: Pain level will decrease  Outcome: Ongoing  Goal: Control of acute pain  Description: Control of acute pain  0/6/2823 [id number] by John Daniels RN  Outcome: Ongoing  6/5/2021 0058 by Robibe Mccauley  Outcome: Met This Shift  Goal: Control of chronic pain  Description: Control of chronic pain  Outcome: Ongoing     Problem: Skin Integrity:  Goal: Will show no infection signs and symptoms  Description: Will show no infection signs and symptoms  Outcome: Ongoing  Goal: Absence of new skin breakdown  Description: Absence of new skin breakdown  Outcome: Ongoing     Problem: Falls - Risk of:  Goal: Will remain free from falls  Description: Will remain free from falls  Outcome: Ongoing  Goal: Absence of physical injury  Description: Absence of physical injury  Outcome: Ongoing

## 2021-06-05 NOTE — PROGRESS NOTES
[facility]   Progress Note    Admitting Date and Time: 6/1/2021  7:30 PM  Admit Dx: RLQ abdominal pain [R10.31]     Seen for follow on Rt abdominal pain / Acute cholecystitis / now s/p lap subtotal cholecystectomy with drain    Subjective:  Feels much better ,denies n/v/ abd pain. Continues with post op soreness but improved. Drain with bloody serous fluid. Tolerating diet & has moved bowels. ROS: denies cp, sob, HA unless stated above.      insulin lispro  0-18 Units Subcutaneous TID WC    tamsulosin  0.8 mg Oral Daily    metoprolol succinate  12.5 mg Oral Daily    lisinopril  40 mg Oral Daily    levothyroxine  25 mcg Oral Daily    hydrALAZINE  25 mg Oral TID    ezetimibe  10 mg Oral Daily    aspirin  81 mg Oral Daily    amLODIPine  10 mg Oral Daily    sodium chloride flush  5-40 mL Intravenous 2 times per day    enoxaparin  40 mg Subcutaneous Daily    polyethylene glycol  17 g Oral BID    piperacillin-tazobactam  3,375 mg Intravenous Q8H    insulin lispro  0-9 Units Subcutaneous Nightly    insulin glargine  10 Units Subcutaneous Nightly     HYDROcodone 5 mg - acetaminophen, 1 tablet, Q6H PRN  aluminum & magnesium hydroxide-simethicone, 30 mL, Q8H PRN  nitroGLYCERIN, 0.4 mg, Q5 Min PRN  sodium chloride flush, 5-40 mL, PRN  ondansetron, 4 mg, Q8H PRN   Or  ondansetron, 4 mg, Q6H PRN  polyethylene glycol, 17 g, Daily PRN  acetaminophen, 650 mg, Q6H PRN   Or  acetaminophen, 650 mg, Q6H PRN  glucose, 15 g, PRN  dextrose, 12.5 g, PRN  glucagon (rDNA), 1 mg, PRN  dextrose, 100 mL/hr, PRN         Objective:    /65   Pulse 95   Temp 98.6 °F (37 °C) (Oral)   Resp 18   Ht 5' 9\" (1.753 m)   Wt 218 lb 7.6 oz (99.1 kg)   SpO2 95%   BMI 32.26 kg/m²   General Appearance: alert and oriented to person, place and time, in no acute distress, Uncomfortable sec to pain  Skin: warm and dry  Head: normocephalic and atraumatic  Eyes: pupils equal, round, and reactive to light, extraocular eye movements intact, conjunctivae normal  Neck: supple and non-tender without mass  Pulmonary/Chest: clear to auscultation bilaterally  Cardiovascular: normal rate, regular rhythm, normal S1 and S2  Abdomen: soft,post op soreness RUQ , drain with bloody serous drainage, normal bowel sounds, no masses or organomegaly  Extremities: no cyanosis, clubbing Musculoskeletal: normal range of motion  Neurologic:  no cranial nerve deficit,speech normal      Recent Labs     06/03/21 0327 06/04/21  1355 06/05/21  0610    136 136   K 3.7 3.4* 3.5    104 103   CO2 22 23 25   BUN 22 19 20   CREATININE 1.1 1.0 1.1   GLUCOSE 158* 172* 148*   CALCIUM 8.8 8.6 8.1*       Recent Labs     06/03/21 0327 06/04/21  1355 06/05/21  0610   WBC 13.9* 11.9* 10.6   RBC 4.99 4.36 4.15   HGB 13.9 12.3* 11.6*   HCT 42.9 36.6* 35.3*   MCV 86.0 83.9 85.1   MCH 27.9 28.2 28.0   MCHC 32.4 33.6 32.9   RDW 13.7 13.8 14.1    235 251   MPV 9.6 9.9 10.1       Labs and images reviewed    Radiology:   NM HEPATOBILIARY SCAN W EJECTION FRACTION   Final Result   Non-visualization of the gallbladder is compatible with acute   cholecystitis/cystic duct obstruction. CT ABDOMEN PELVIS W IV CONTRAST Additional Contrast? None   Final Result   Mild gallbladder distension. Correlate with right upper quadrant pain. Diffuse colonic fecal retention. Significant prostatomegaly. US GALLBLADDER RUQ   Final Result   Gallbladder sludge in an otherwise normal-appearing gallbladder. Normal   common bile duct. Hepatomegaly at 21.0 cm with diffuse fatty infiltration of the liver. 2.8 cm soft tissue density near the juan hepatis versus bowel. Assessment:    Principal Problem:    RLQ abdominal pain  Active Problems:    Obesity (BMI 30-39. 9)    Right upper quadrant abdominal pain  Resolved Problems:    * No resolved hospital problems. *      Plan:  Acute Cholecystitis -CT , US reviewed Has + HIDA scan. Continue IV Zosyn , IVF and other supportive rx. S/p subtotal lap [place] with drain on 6/3/21. Patient had gangrenous & necrotic GB. As above tolerating diet & has moved bowels. Continue post op care as per surg. Disch planned for tomorrow. HTN - resume as per home    DMII - Metformin on hold, continue ISS    CAD s/p pci - had atypical CP on admission , CP has resolved , intial cardiac work up EKG , troponin  X 2 neg. Monitor. Hypothyroidism - on synthroid. as per home    On Lovenox for dvt prophy.     Full code                 Electronically signed by Kaykay Soto MD on 6/5/2021 at 1:48 PM

## 2021-06-06 VITALS
RESPIRATION RATE: 18 BRPM | DIASTOLIC BLOOD PRESSURE: 72 MMHG | HEART RATE: 82 BPM | OXYGEN SATURATION: 92 % | SYSTOLIC BLOOD PRESSURE: 153 MMHG | HEIGHT: 69 IN | BODY MASS INDEX: 32.36 KG/M2 | TEMPERATURE: 98.3 F | WEIGHT: 218.48 LBS

## 2021-06-06 LAB
ALBUMIN SERPL-MCNC: 2.6 G/DL (ref 3.5–5.2)
ALP BLD-CCNC: 90 U/L (ref 40–129)
ALT SERPL-CCNC: 21 U/L (ref 0–40)
ANION GAP SERPL CALCULATED.3IONS-SCNC: 9 MMOL/L (ref 7–16)
AST SERPL-CCNC: 18 U/L (ref 0–39)
BASOPHILS ABSOLUTE: 0.04 E9/L (ref 0–0.2)
BASOPHILS RELATIVE PERCENT: 0.5 % (ref 0–2)
BILIRUB SERPL-MCNC: 0.6 MG/DL (ref 0–1.2)
BUN BLDV-MCNC: 17 MG/DL (ref 6–23)
CALCIUM SERPL-MCNC: 8.3 MG/DL (ref 8.6–10.2)
CHLORIDE BLD-SCNC: 105 MMOL/L (ref 98–107)
CO2: 24 MMOL/L (ref 22–29)
CREAT SERPL-MCNC: 1 MG/DL (ref 0.7–1.2)
EOSINOPHILS ABSOLUTE: 0.27 E9/L (ref 0.05–0.5)
EOSINOPHILS RELATIVE PERCENT: 3.2 % (ref 0–6)
GFR AFRICAN AMERICAN: >60
GFR NON-AFRICAN AMERICAN: >60 ML/MIN/1.73
GLUCOSE BLD-MCNC: 145 MG/DL (ref 74–99)
HCT VFR BLD CALC: 36.3 % (ref 37–54)
HEMOGLOBIN: 12.1 G/DL (ref 12.5–16.5)
IMMATURE GRANULOCYTES #: 0.09 E9/L
IMMATURE GRANULOCYTES %: 1.1 % (ref 0–5)
LYMPHOCYTES ABSOLUTE: 1.01 E9/L (ref 1.5–4)
LYMPHOCYTES RELATIVE PERCENT: 12 % (ref 20–42)
MCH RBC QN AUTO: 28.2 PG (ref 26–35)
MCHC RBC AUTO-ENTMCNC: 33.3 % (ref 32–34.5)
MCV RBC AUTO: 84.6 FL (ref 80–99.9)
METER GLUCOSE: 146 MG/DL (ref 74–99)
METER GLUCOSE: 208 MG/DL (ref 74–99)
MONOCYTES ABSOLUTE: 0.76 E9/L (ref 0.1–0.95)
MONOCYTES RELATIVE PERCENT: 9 % (ref 2–12)
NEUTROPHILS ABSOLUTE: 6.27 E9/L (ref 1.8–7.3)
NEUTROPHILS RELATIVE PERCENT: 74.2 % (ref 43–80)
PDW BLD-RTO: 14.5 FL (ref 11.5–15)
PLATELET # BLD: 247 E9/L (ref 130–450)
PMV BLD AUTO: 9.7 FL (ref 7–12)
POTASSIUM SERPL-SCNC: 3.5 MMOL/L (ref 3.5–5)
RBC # BLD: 4.29 E12/L (ref 3.8–5.8)
SODIUM BLD-SCNC: 138 MMOL/L (ref 132–146)
TOTAL PROTEIN: 6 G/DL (ref 6.4–8.3)
WBC # BLD: 8.4 E9/L (ref 4.5–11.5)

## 2021-06-06 PROCEDURE — 85025 COMPLETE CBC W/AUTO DIFF WBC: CPT

## 2021-06-06 PROCEDURE — 2580000003 HC RX 258: Performed by: STUDENT IN AN ORGANIZED HEALTH CARE EDUCATION/TRAINING PROGRAM

## 2021-06-06 PROCEDURE — 6370000000 HC RX 637 (ALT 250 FOR IP): Performed by: INTERNAL MEDICINE

## 2021-06-06 PROCEDURE — 82962 GLUCOSE BLOOD TEST: CPT

## 2021-06-06 PROCEDURE — 36415 COLL VENOUS BLD VENIPUNCTURE: CPT

## 2021-06-06 PROCEDURE — 6370000000 HC RX 637 (ALT 250 FOR IP): Performed by: STUDENT IN AN ORGANIZED HEALTH CARE EDUCATION/TRAINING PROGRAM

## 2021-06-06 PROCEDURE — 6370000000 HC RX 637 (ALT 250 FOR IP): Performed by: SURGERY

## 2021-06-06 PROCEDURE — 6360000002 HC RX W HCPCS: Performed by: STUDENT IN AN ORGANIZED HEALTH CARE EDUCATION/TRAINING PROGRAM

## 2021-06-06 PROCEDURE — 80053 COMPREHEN METABOLIC PANEL: CPT

## 2021-06-06 RX ORDER — CEFDINIR 300 MG/1
300 CAPSULE ORAL EVERY 12 HOURS SCHEDULED
Qty: 14 CAPSULE | Refills: 0 | Status: SHIPPED | OUTPATIENT
Start: 2021-06-06 | End: 2021-06-13

## 2021-06-06 RX ORDER — CEFDINIR 300 MG/1
300 CAPSULE ORAL EVERY 12 HOURS SCHEDULED
Status: DISCONTINUED | OUTPATIENT
Start: 2021-06-06 | End: 2021-06-06 | Stop reason: HOSPADM

## 2021-06-06 RX ORDER — METRONIDAZOLE 500 MG/1
500 TABLET ORAL EVERY 8 HOURS SCHEDULED
Status: DISCONTINUED | OUTPATIENT
Start: 2021-06-06 | End: 2021-06-06 | Stop reason: HOSPADM

## 2021-06-06 RX ORDER — METRONIDAZOLE 500 MG/1
500 TABLET ORAL EVERY 8 HOURS SCHEDULED
Qty: 21 TABLET | Refills: 0 | Status: SHIPPED | OUTPATIENT
Start: 2021-06-06 | End: 2021-06-13

## 2021-06-06 RX ADMIN — EZETIMIBE 10 MG: 10 TABLET ORAL at 08:00

## 2021-06-06 RX ADMIN — ASPIRIN 81 MG: 81 TABLET, COATED ORAL at 07:59

## 2021-06-06 RX ADMIN — LEVOTHYROXINE SODIUM 25 MCG: 25 TABLET ORAL at 06:13

## 2021-06-06 RX ADMIN — TAMSULOSIN HYDROCHLORIDE 0.8 MG: 0.4 CAPSULE ORAL at 07:59

## 2021-06-06 RX ADMIN — SODIUM CHLORIDE 25 ML: 9 INJECTION, SOLUTION INTRAVENOUS at 00:38

## 2021-06-06 RX ADMIN — CEFDINIR 300 MG: 300 CAPSULE ORAL at 10:03

## 2021-06-06 RX ADMIN — HYDRALAZINE HYDROCHLORIDE 25 MG: 25 TABLET, FILM COATED ORAL at 07:59

## 2021-06-06 RX ADMIN — ENOXAPARIN SODIUM 40 MG: 40 INJECTION SUBCUTANEOUS at 07:59

## 2021-06-06 RX ADMIN — INSULIN LISPRO 6 UNITS: 100 INJECTION, SOLUTION INTRAVENOUS; SUBCUTANEOUS at 11:45

## 2021-06-06 RX ADMIN — LISINOPRIL 40 MG: 20 TABLET ORAL at 08:00

## 2021-06-06 RX ADMIN — POLYETHYLENE GLYCOL 3350 17 G: 17 POWDER, FOR SOLUTION ORAL at 08:00

## 2021-06-06 RX ADMIN — SODIUM CHLORIDE, PRESERVATIVE FREE 10 ML: 5 INJECTION INTRAVENOUS at 08:04

## 2021-06-06 RX ADMIN — AMLODIPINE BESYLATE 10 MG: 10 TABLET ORAL at 08:00

## 2021-06-06 RX ADMIN — SODIUM CHLORIDE 25 ML: 9 INJECTION, SOLUTION INTRAVENOUS at 07:30

## 2021-06-06 RX ADMIN — INSULIN LISPRO 3 UNITS: 100 INJECTION, SOLUTION INTRAVENOUS; SUBCUTANEOUS at 08:00

## 2021-06-06 RX ADMIN — PIPERACILLIN AND TAZOBACTAM 3375 MG: 3; .375 INJECTION, POWDER, LYOPHILIZED, FOR SOLUTION INTRAVENOUS at 03:33

## 2021-06-06 RX ADMIN — METOPROLOL SUCCINATE 12.5 MG: 25 TABLET, EXTENDED RELEASE ORAL at 07:59

## 2021-06-06 ASSESSMENT — PAIN SCALES - GENERAL: PAINLEVEL_OUTOF10: 0

## 2021-06-06 NOTE — DISCHARGE INSTR - DIET
Good nutrition is important when healing from an illness, injury, or surgery. Follow any nutrition recommendations given to you during your hospital stay. If you were given an oral nutrition supplement while in the hospital, continue to take this supplement at home. You can take it with meals, in-between meals, and/or before bedtime. These supplements can be purchased at most local grocery stores, pharmacies, and chain [employer]-stores. If you have any questions about your diet or nutrition, call the hospital and ask for the dietitian.   Resume a high-fiber diet

## 2021-06-06 NOTE — PLAN OF CARE
Problem: Pain:  Description: Pain management should include both nonpharmacologic and pharmacologic interventions.   Goal: Control of acute pain  Description: Control of acute pain  Outcome: Met This Shift

## 2021-06-06 NOTE — PROGRESS NOTES
Patient was discharged by surgery service without letting me know. I have not seen or evaluated the patient. Reviewed discharge medications-they are appropriate. See discharge summary for further care.

## 2021-06-06 NOTE — DISCHARGE SUMMARY
Surgery Discharge Summary    [address] [place] [address] SUMMARY:                The patient is a 73 y.o. male who was admitted to the hospital on 6/1/2021  7:30 PM for treatment of acute cholecystitis. The patient underwent robotic assisted laparoscopic cholecystectomy on 6/3/2021. The patient is a JOSY drain in place and has minimal output. .  The patient was discharged on No discharge date for patient encounter. tolerating a diet, moving bowels, and urinating without difficulty. The patient is on oral antibiotics. The incisions were clean and intact. The patient was discharged to home in satisfactory condition with instructions to call for a follow up appointment. Hospital Problem List:  Principal Problem:    RLQ abdominal pain  Active Problems:    Obesity (BMI 30-39. 9)    Right upper quadrant abdominal pain  Resolved Problems:    * No resolved hospital problems.  *     Procedure(s) (LRB):  LAPAROSCOPIC ROBOTIC ASSISTED SUBTOTALCHOLECYSTECTOMY (N/A)    Discharge Medications:    Gregor Herrera   Home Medication Instructions [id number]    Printed on:06/06/21 8356   Medication Information                      amLODIPine (NORVASC) 10 MG tablet  Take 1 tablet by mouth daily             aspirin 81 MG EC tablet  Take 1 tablet by mouth daily             Cholecalciferol (VITAMIN D3) 5000 units TABS  Take by mouth             clopidogrel (PLAVIX) 75 MG tablet  Take 1 tablet by mouth daily             ezetimibe (ZETIA) 10 MG tablet  Take 10 mg by mouth daily Patient can not take regular statins             GLIPIZIDE PO  Take 10 mg by mouth daily              Glucosamine HCl (GLUCOSAMINE MAXIMUM STRENGTH) 1500 MG TABS  Take by mouth daily Indications: contains msm             hydrALAZINE (APRESOLINE) 25 MG tablet  Take 1 tablet by mouth 3 times daily             ketotifen ( KETOTIFEN FUMARATE) 0.025 % ophthalmic solution  Place 1 drop into both eyes 2 times daily             levothyroxine (SYNTHROID) 25 MCG tablet  Take 25 mcg by mouth Daily             lisinopril (PRINIVIL;ZESTRIL) 40 MG tablet  Take 1 tablet by mouth daily             metFORMIN (GLUCOPHAGE) 1000 MG tablet  Take 1,000 mg by mouth Daily with supper              metoprolol succinate (TOPROL XL) 25 MG extended release tablet  Take 0.5 tablets by mouth daily             nitroGLYCERIN (NITROSTAT) 0.4 MG SL tablet  up to max of 3 total doses. If no relief after 1 dose, call 911. oxyCODONE-acetaminophen (PERCOCET) 5-325 MG per tablet  Take 1 tablet by mouth every 6 hours as needed for Pain for up to 7 days. Intended supply: 7 days.  Take lowest dose possible to manage pain             polyethylene glycol (GLYCOLAX) 17 g packet  Take 17 g by mouth daily as needed for Constipation             tamsulosin (FLOMAX) 0.4 MG capsule  Take 0.8 mg by mouth daily                 Ainsley Puga MD  6/6/2021

## 2021-06-09 PROBLEM — R11.2 NAUSEA AND VOMITING: Status: ACTIVE | Noted: 2021-06-09

## 2021-06-14 ENCOUNTER — HOSPITAL ENCOUNTER (OUTPATIENT)
Dept: NUCLEAR MEDICINE | Age: 71
Discharge: HOME | End: 2021-06-16
Payer: MEDICARE

## 2021-06-14 DIAGNOSIS — K80.10 CALCULUS OF GALLBLADDER WITH CHOLECYSTITIS WITHOUT BILIARY OBSTRUCTION, UNSPECIFIED CHOLECYSTITIS ACUITY: ICD-10-CM

## 2021-06-14 PROCEDURE — 3430000000 HC RX DIAGNOSTIC RADIOPHARMACEUTICAL: Performed by: RADIOLOGY

## 2021-06-14 PROCEDURE — 78226 HEPATOBILIARY SYSTEM IMAGING: CPT

## 2021-06-14 PROCEDURE — A9537 TC99M MEBROFENIN: HCPCS | Performed by: RADIOLOGY

## 2021-06-14 PROCEDURE — 78226 HEPATOBILIARY SYSTEM IMAGING: CPT | Performed by: RADIOLOGY

## 2021-06-14 RX ADMIN — Medication 8.6 MILLICURIE: at 07:42

## 2021-09-28 ENCOUNTER — HOSPITAL ENCOUNTER (INPATIENT)
Age: 71
LOS: 3 days | Discharge: HOME | DRG: 661 | End: 2021-10-01
Attending: EMERGENCY MEDICINE | Admitting: INTERNAL MEDICINE
Payer: OTHER GOVERNMENT

## 2021-09-28 ENCOUNTER — APPOINTMENT (OUTPATIENT)
Dept: CT IMAGING | Age: 71
DRG: 661 | End: 2021-09-28
Payer: OTHER GOVERNMENT

## 2021-09-28 DIAGNOSIS — N20.1 URETEROLITHIASIS: Primary | ICD-10-CM

## 2021-09-28 DIAGNOSIS — N17.9 AKI (ACUTE KIDNEY INJURY) (HCC): ICD-10-CM

## 2021-09-28 LAB
ALBUMIN SERPL-MCNC: 3.8 G/DL (ref 3.5–5.2)
ALP BLD-CCNC: 80 U/L (ref 40–129)
ALT SERPL-CCNC: 15 U/L (ref 0–40)
ANION GAP SERPL CALCULATED.3IONS-SCNC: 12 MMOL/L (ref 7–16)
AST SERPL-CCNC: 19 U/L (ref 0–39)
BACTERIA: ABNORMAL /HPF
BASOPHILS ABSOLUTE: 0.03 E9/L (ref 0–0.2)
BASOPHILS RELATIVE PERCENT: 0.4 % (ref 0–2)
BILIRUB SERPL-MCNC: 0.4 MG/DL (ref 0–1.2)
BILIRUBIN URINE: NEGATIVE
BLOOD, URINE: ABNORMAL
BUN BLDV-MCNC: 19 MG/DL (ref 6–23)
CALCIUM SERPL-MCNC: 8.9 MG/DL (ref 8.6–10.2)
CHLORIDE BLD-SCNC: 105 MMOL/L (ref 98–107)
CLARITY: CLEAR
CO2: 23 MMOL/L (ref 22–29)
COLOR: YELLOW
CREAT SERPL-MCNC: 1.7 MG/DL (ref 0.7–1.2)
EOSINOPHILS ABSOLUTE: 0.13 E9/L (ref 0.05–0.5)
EOSINOPHILS RELATIVE PERCENT: 1.6 % (ref 0–6)
GFR AFRICAN AMERICAN: 48
GFR NON-AFRICAN AMERICAN: 40 ML/MIN/1.73
GLUCOSE BLD-MCNC: 107 MG/DL (ref 74–99)
GLUCOSE URINE: NEGATIVE MG/DL
HCT VFR BLD CALC: 41.5 % (ref 37–54)
HEMOGLOBIN: 13.9 G/DL (ref 12.5–16.5)
IMMATURE GRANULOCYTES #: 0.03 E9/L
IMMATURE GRANULOCYTES %: 0.4 % (ref 0–5)
KETONES, URINE: NEGATIVE MG/DL
LEUKOCYTE ESTERASE, URINE: NEGATIVE
LIPASE: 247 U/L (ref 13–60)
LYMPHOCYTES ABSOLUTE: 1.91 E9/L (ref 1.5–4)
LYMPHOCYTES RELATIVE PERCENT: 23.6 % (ref 20–42)
MCH RBC QN AUTO: 28.5 PG (ref 26–35)
MCHC RBC AUTO-ENTMCNC: 33.5 % (ref 32–34.5)
MCV RBC AUTO: 85 FL (ref 80–99.9)
METER GLUCOSE: 172 MG/DL (ref 74–99)
MONOCYTES ABSOLUTE: 0.76 E9/L (ref 0.1–0.95)
MONOCYTES RELATIVE PERCENT: 9.4 % (ref 2–12)
NEUTROPHILS ABSOLUTE: 5.23 E9/L (ref 1.8–7.3)
NEUTROPHILS RELATIVE PERCENT: 64.6 % (ref 43–80)
NITRITE, URINE: NEGATIVE
PDW BLD-RTO: 13.4 FL (ref 11.5–15)
PH UA: 6 (ref 5–9)
PLATELET # BLD: 274 E9/L (ref 130–450)
PMV BLD AUTO: 9.9 FL (ref 7–12)
POTASSIUM REFLEX MAGNESIUM: 3.8 MMOL/L (ref 3.5–5)
PROTEIN UA: NEGATIVE MG/DL
RBC # BLD: 4.88 E12/L (ref 3.8–5.8)
RBC UA: ABNORMAL /HPF (ref 0–2)
SODIUM BLD-SCNC: 140 MMOL/L (ref 132–146)
SPECIFIC GRAVITY UA: 1.02 (ref 1–1.03)
TOTAL PROTEIN: 6.8 G/DL (ref 6.4–8.3)
UROBILINOGEN, URINE: 0.2 E.U./DL
WBC # BLD: 8.1 E9/L (ref 4.5–11.5)
WBC UA: ABNORMAL /HPF (ref 0–5)

## 2021-09-28 PROCEDURE — 6360000002 HC RX W HCPCS: Performed by: STUDENT IN AN ORGANIZED HEALTH CARE EDUCATION/TRAINING PROGRAM

## 2021-09-28 PROCEDURE — 85025 COMPLETE CBC W/AUTO DIFF WBC: CPT

## 2021-09-28 PROCEDURE — 83690 ASSAY OF LIPASE: CPT

## 2021-09-28 PROCEDURE — 96374 THER/PROPH/DIAG INJ IV PUSH: CPT

## 2021-09-28 PROCEDURE — 80053 COMPREHEN METABOLIC PANEL: CPT

## 2021-09-28 PROCEDURE — 99223 1ST HOSP IP/OBS HIGH 75: CPT | Performed by: INTERNAL MEDICINE

## 2021-09-28 PROCEDURE — 2580000003 HC RX 258: Performed by: INTERNAL MEDICINE

## 2021-09-28 PROCEDURE — 74176 CT ABD & PELVIS W/O CONTRAST: CPT

## 2021-09-28 PROCEDURE — 96375 TX/PRO/DX INJ NEW DRUG ADDON: CPT

## 2021-09-28 PROCEDURE — 6360000002 HC RX W HCPCS: Performed by: EMERGENCY MEDICINE

## 2021-09-28 PROCEDURE — 82962 GLUCOSE BLOOD TEST: CPT

## 2021-09-28 PROCEDURE — 99282 EMERGENCY DEPT VISIT SF MDM: CPT

## 2021-09-28 PROCEDURE — 1200000000 HC SEMI PRIVATE

## 2021-09-28 PROCEDURE — 81001 URINALYSIS AUTO W/SCOPE: CPT

## 2021-09-28 PROCEDURE — 6370000000 HC RX 637 (ALT 250 FOR IP): Performed by: INTERNAL MEDICINE

## 2021-09-28 PROCEDURE — 2580000003 HC RX 258: Performed by: STUDENT IN AN ORGANIZED HEALTH CARE EDUCATION/TRAINING PROGRAM

## 2021-09-28 PROCEDURE — 6370000000 HC RX 637 (ALT 250 FOR IP): Performed by: STUDENT IN AN ORGANIZED HEALTH CARE EDUCATION/TRAINING PROGRAM

## 2021-09-28 RX ORDER — SODIUM CHLORIDE 9 MG/ML
25 INJECTION, SOLUTION INTRAVENOUS PRN
Status: DISCONTINUED | OUTPATIENT
Start: 2021-09-28 | End: 2021-10-01 | Stop reason: HOSPADM

## 2021-09-28 RX ORDER — ACETAMINOPHEN 325 MG/1
650 TABLET ORAL EVERY 6 HOURS PRN
Status: DISCONTINUED | OUTPATIENT
Start: 2021-09-28 | End: 2021-10-01 | Stop reason: HOSPADM

## 2021-09-28 RX ORDER — SODIUM CHLORIDE 0.9 % (FLUSH) 0.9 %
5-40 SYRINGE (ML) INJECTION PRN
Status: DISCONTINUED | OUTPATIENT
Start: 2021-09-28 | End: 2021-10-01 | Stop reason: HOSPADM

## 2021-09-28 RX ORDER — TAMSULOSIN HYDROCHLORIDE 0.4 MG/1
0.8 CAPSULE ORAL DAILY
Status: DISCONTINUED | OUTPATIENT
Start: 2021-09-29 | End: 2021-09-29

## 2021-09-28 RX ORDER — ONDANSETRON 2 MG/ML
4 INJECTION INTRAMUSCULAR; INTRAVENOUS ONCE
Status: COMPLETED | OUTPATIENT
Start: 2021-09-28 | End: 2021-09-28

## 2021-09-28 RX ORDER — MAGNESIUM SULFATE IN WATER 40 MG/ML
2000 INJECTION, SOLUTION INTRAVENOUS PRN
Status: DISCONTINUED | OUTPATIENT
Start: 2021-09-28 | End: 2021-10-01 | Stop reason: HOSPADM

## 2021-09-28 RX ORDER — CLOPIDOGREL BISULFATE 75 MG/1
75 TABLET ORAL DAILY
Status: DISCONTINUED | OUTPATIENT
Start: 2021-09-29 | End: 2021-10-01 | Stop reason: HOSPADM

## 2021-09-28 RX ORDER — SODIUM CHLORIDE 9 MG/ML
INJECTION, SOLUTION INTRAVENOUS CONTINUOUS
Status: DISCONTINUED | OUTPATIENT
Start: 2021-09-29 | End: 2021-10-01 | Stop reason: HOSPADM

## 2021-09-28 RX ORDER — POLYETHYLENE GLYCOL 3350 17 G/17G
17 POWDER, FOR SOLUTION ORAL DAILY PRN
Status: DISCONTINUED | OUTPATIENT
Start: 2021-09-28 | End: 2021-10-01 | Stop reason: HOSPADM

## 2021-09-28 RX ORDER — LEVOTHYROXINE SODIUM 0.03 MG/1
25 TABLET ORAL DAILY
Status: DISCONTINUED | OUTPATIENT
Start: 2021-09-29 | End: 2021-10-01 | Stop reason: HOSPADM

## 2021-09-28 RX ORDER — EZETIMIBE 10 MG/1
10 TABLET ORAL DAILY
Status: DISCONTINUED | OUTPATIENT
Start: 2021-09-29 | End: 2021-10-01 | Stop reason: HOSPADM

## 2021-09-28 RX ORDER — AMLODIPINE BESYLATE 10 MG/1
10 TABLET ORAL DAILY
Status: DISCONTINUED | OUTPATIENT
Start: 2021-09-29 | End: 2021-09-30

## 2021-09-28 RX ORDER — HYDRALAZINE HYDROCHLORIDE 20 MG/ML
10 INJECTION INTRAMUSCULAR; INTRAVENOUS ONCE
Status: COMPLETED | OUTPATIENT
Start: 2021-09-28 | End: 2021-09-28

## 2021-09-28 RX ORDER — ACETAMINOPHEN 650 MG/1
650 SUPPOSITORY RECTAL EVERY 6 HOURS PRN
Status: DISCONTINUED | OUTPATIENT
Start: 2021-09-28 | End: 2021-10-01 | Stop reason: HOSPADM

## 2021-09-28 RX ORDER — INSULIN GLARGINE 100 [IU]/ML
10 INJECTION, SOLUTION SUBCUTANEOUS NIGHTLY
Status: DISCONTINUED | OUTPATIENT
Start: 2021-09-28 | End: 2021-10-01 | Stop reason: HOSPADM

## 2021-09-28 RX ORDER — ONDANSETRON 2 MG/ML
4 INJECTION INTRAMUSCULAR; INTRAVENOUS EVERY 6 HOURS PRN
Status: DISCONTINUED | OUTPATIENT
Start: 2021-09-28 | End: 2021-10-01 | Stop reason: HOSPADM

## 2021-09-28 RX ORDER — KETOROLAC TROMETHAMINE 30 MG/ML
15 INJECTION, SOLUTION INTRAMUSCULAR; INTRAVENOUS ONCE
Status: COMPLETED | OUTPATIENT
Start: 2021-09-28 | End: 2021-09-28

## 2021-09-28 RX ORDER — METOPROLOL SUCCINATE 25 MG/1
25 TABLET, EXTENDED RELEASE ORAL DAILY
Status: DISCONTINUED | OUTPATIENT
Start: 2021-09-29 | End: 2021-09-29

## 2021-09-28 RX ORDER — GABAPENTIN 300 MG/1
600 CAPSULE ORAL NIGHTLY
COMMUNITY

## 2021-09-28 RX ORDER — ASPIRIN 81 MG/1
81 TABLET ORAL DAILY
Status: DISCONTINUED | OUTPATIENT
Start: 2021-09-29 | End: 2021-10-01 | Stop reason: HOSPADM

## 2021-09-28 RX ORDER — ONDANSETRON 4 MG/1
4 TABLET, ORALLY DISINTEGRATING ORAL EVERY 8 HOURS PRN
Status: DISCONTINUED | OUTPATIENT
Start: 2021-09-28 | End: 2021-10-01 | Stop reason: HOSPADM

## 2021-09-28 RX ORDER — 0.9 % SODIUM CHLORIDE 0.9 %
1000 INTRAVENOUS SOLUTION INTRAVENOUS ONCE
Status: COMPLETED | OUTPATIENT
Start: 2021-09-28 | End: 2021-09-28

## 2021-09-28 RX ORDER — LISINOPRIL 20 MG/1
40 TABLET ORAL DAILY
Status: DISCONTINUED | OUTPATIENT
Start: 2021-09-29 | End: 2021-10-01 | Stop reason: HOSPADM

## 2021-09-28 RX ORDER — HYDRALAZINE HYDROCHLORIDE 25 MG/1
25 TABLET, FILM COATED ORAL 3 TIMES DAILY
Status: DISCONTINUED | OUTPATIENT
Start: 2021-09-29 | End: 2021-10-01 | Stop reason: HOSPADM

## 2021-09-28 RX ORDER — TAMSULOSIN HYDROCHLORIDE 0.4 MG/1
0.4 CAPSULE ORAL ONCE
Status: COMPLETED | OUTPATIENT
Start: 2021-09-28 | End: 2021-09-28

## 2021-09-28 RX ORDER — MORPHINE SULFATE 2 MG/ML
2 INJECTION, SOLUTION INTRAMUSCULAR; INTRAVENOUS EVERY 4 HOURS PRN
Status: DISCONTINUED | OUTPATIENT
Start: 2021-09-28 | End: 2021-10-01 | Stop reason: HOSPADM

## 2021-09-28 RX ORDER — POTASSIUM CHLORIDE 7.45 MG/ML
10 INJECTION INTRAVENOUS PRN
Status: DISCONTINUED | OUTPATIENT
Start: 2021-09-28 | End: 2021-10-01 | Stop reason: HOSPADM

## 2021-09-28 RX ORDER — SODIUM CHLORIDE 0.9 % (FLUSH) 0.9 %
5-40 SYRINGE (ML) INJECTION EVERY 12 HOURS SCHEDULED
Status: DISCONTINUED | OUTPATIENT
Start: 2021-09-29 | End: 2021-10-01 | Stop reason: HOSPADM

## 2021-09-28 RX ADMIN — TAMSULOSIN HYDROCHLORIDE 0.4 MG: 0.4 CAPSULE ORAL at 19:58

## 2021-09-28 RX ADMIN — SODIUM CHLORIDE: 9 INJECTION, SOLUTION INTRAVENOUS at 23:57

## 2021-09-28 RX ADMIN — KETOROLAC TROMETHAMINE 15 MG: 30 INJECTION, SOLUTION INTRAMUSCULAR at 18:38

## 2021-09-28 RX ADMIN — HYDRALAZINE HYDROCHLORIDE 10 MG: 20 INJECTION INTRAMUSCULAR; INTRAVENOUS at 20:15

## 2021-09-28 RX ADMIN — SODIUM CHLORIDE 1000 ML: 9 INJECTION, SOLUTION INTRAVENOUS at 18:38

## 2021-09-28 RX ADMIN — ONDANSETRON 4 MG: 2 INJECTION INTRAMUSCULAR; INTRAVENOUS at 18:38

## 2021-09-28 RX ADMIN — INSULIN LISPRO 1 UNITS: 100 INJECTION, SOLUTION INTRAVENOUS; SUBCUTANEOUS at 23:56

## 2021-09-28 RX ADMIN — INSULIN GLARGINE 10 UNITS: 100 INJECTION, SOLUTION SUBCUTANEOUS at 23:56

## 2021-09-28 ASSESSMENT — ENCOUNTER SYMPTOMS
SINUS PRESSURE: 0
BACK PAIN: 0
EYE DISCHARGE: 0
WHEEZING: 0
SHORTNESS OF BREATH: 0
COUGH: 0
NAUSEA: 1
SORE THROAT: 0
EYE PAIN: 0
EYE REDNESS: 0
ABDOMINAL PAIN: 0
VOMITING: 0
DIARRHEA: 0

## 2021-09-28 ASSESSMENT — PAIN SCALES - GENERAL
PAINLEVEL_OUTOF10: 7
PAINLEVEL_OUTOF10: 0
PAINLEVEL_OUTOF10: 7

## 2021-09-28 ASSESSMENT — PAIN DESCRIPTION - PAIN TYPE: TYPE: ACUTE PAIN

## 2021-09-29 ENCOUNTER — ANESTHESIA EVENT (OUTPATIENT)
Dept: OPERATING ROOM | Age: 71
DRG: 661 | End: 2021-09-29
Payer: OTHER GOVERNMENT

## 2021-09-29 LAB
ALBUMIN SERPL-MCNC: 3.6 G/DL (ref 3.5–5.2)
ALBUMIN SERPL-MCNC: 3.7 G/DL (ref 3.5–5.2)
ALP BLD-CCNC: 75 U/L (ref 40–129)
ALP BLD-CCNC: 78 U/L (ref 40–129)
ALT SERPL-CCNC: 13 U/L (ref 0–40)
ALT SERPL-CCNC: 15 U/L (ref 0–40)
ANION GAP SERPL CALCULATED.3IONS-SCNC: 10 MMOL/L (ref 7–16)
ANION GAP SERPL CALCULATED.3IONS-SCNC: 10 MMOL/L (ref 7–16)
AST SERPL-CCNC: 17 U/L (ref 0–39)
AST SERPL-CCNC: 18 U/L (ref 0–39)
BASOPHILS ABSOLUTE: 0.02 E9/L (ref 0–0.2)
BASOPHILS ABSOLUTE: 0.02 E9/L (ref 0–0.2)
BASOPHILS RELATIVE PERCENT: 0.2 % (ref 0–2)
BASOPHILS RELATIVE PERCENT: 0.3 % (ref 0–2)
BILIRUB SERPL-MCNC: 0.4 MG/DL (ref 0–1.2)
BILIRUB SERPL-MCNC: 0.4 MG/DL (ref 0–1.2)
BUN BLDV-MCNC: 19 MG/DL (ref 6–23)
BUN BLDV-MCNC: 19 MG/DL (ref 6–23)
CALCIUM SERPL-MCNC: 8.5 MG/DL (ref 8.6–10.2)
CALCIUM SERPL-MCNC: 8.6 MG/DL (ref 8.6–10.2)
CHLORIDE BLD-SCNC: 105 MMOL/L (ref 98–107)
CHLORIDE BLD-SCNC: 106 MMOL/L (ref 98–107)
CO2: 22 MMOL/L (ref 22–29)
CO2: 23 MMOL/L (ref 22–29)
CREAT SERPL-MCNC: 1.6 MG/DL (ref 0.7–1.2)
CREAT SERPL-MCNC: 1.9 MG/DL (ref 0.7–1.2)
EOSINOPHILS ABSOLUTE: 0.18 E9/L (ref 0.05–0.5)
EOSINOPHILS ABSOLUTE: 0.18 E9/L (ref 0.05–0.5)
EOSINOPHILS RELATIVE PERCENT: 2.1 % (ref 0–6)
EOSINOPHILS RELATIVE PERCENT: 2.5 % (ref 0–6)
GFR AFRICAN AMERICAN: 43
GFR AFRICAN AMERICAN: 52
GFR NON-AFRICAN AMERICAN: 35 ML/MIN/1.73
GFR NON-AFRICAN AMERICAN: 43 ML/MIN/1.73
GLUCOSE BLD-MCNC: 163 MG/DL (ref 74–99)
GLUCOSE BLD-MCNC: 193 MG/DL (ref 74–99)
HCT VFR BLD CALC: 38.5 % (ref 37–54)
HCT VFR BLD CALC: 38.9 % (ref 37–54)
HEMOGLOBIN: 13 G/DL (ref 12.5–16.5)
HEMOGLOBIN: 13.1 G/DL (ref 12.5–16.5)
IMMATURE GRANULOCYTES #: 0.02 E9/L
IMMATURE GRANULOCYTES #: 0.04 E9/L
IMMATURE GRANULOCYTES %: 0.3 % (ref 0–5)
IMMATURE GRANULOCYTES %: 0.5 % (ref 0–5)
LYMPHOCYTES ABSOLUTE: 1.14 E9/L (ref 1.5–4)
LYMPHOCYTES ABSOLUTE: 1.83 E9/L (ref 1.5–4)
LYMPHOCYTES RELATIVE PERCENT: 13.5 % (ref 20–42)
LYMPHOCYTES RELATIVE PERCENT: 25.9 % (ref 20–42)
MAGNESIUM: 2.1 MG/DL (ref 1.6–2.6)
MCH RBC QN AUTO: 28.1 PG (ref 26–35)
MCH RBC QN AUTO: 28.5 PG (ref 26–35)
MCHC RBC AUTO-ENTMCNC: 33.7 % (ref 32–34.5)
MCHC RBC AUTO-ENTMCNC: 33.8 % (ref 32–34.5)
MCV RBC AUTO: 83.5 FL (ref 80–99.9)
MCV RBC AUTO: 84.4 FL (ref 80–99.9)
METER GLUCOSE: 159 MG/DL (ref 74–99)
METER GLUCOSE: 170 MG/DL (ref 74–99)
METER GLUCOSE: 91 MG/DL (ref 74–99)
METER GLUCOSE: 94 MG/DL (ref 74–99)
MONOCYTES ABSOLUTE: 0.74 E9/L (ref 0.1–0.95)
MONOCYTES ABSOLUTE: 0.78 E9/L (ref 0.1–0.95)
MONOCYTES RELATIVE PERCENT: 11 % (ref 2–12)
MONOCYTES RELATIVE PERCENT: 8.8 % (ref 2–12)
NEUTROPHILS ABSOLUTE: 4.23 E9/L (ref 1.8–7.3)
NEUTROPHILS ABSOLUTE: 6.33 E9/L (ref 1.8–7.3)
NEUTROPHILS RELATIVE PERCENT: 60 % (ref 43–80)
NEUTROPHILS RELATIVE PERCENT: 74.9 % (ref 43–80)
PDW BLD-RTO: 13.2 FL (ref 11.5–15)
PDW BLD-RTO: 13.3 FL (ref 11.5–15)
PLATELET # BLD: 244 E9/L (ref 130–450)
PLATELET # BLD: 248 E9/L (ref 130–450)
PMV BLD AUTO: 9.3 FL (ref 7–12)
PMV BLD AUTO: 9.6 FL (ref 7–12)
POTASSIUM REFLEX MAGNESIUM: 3.5 MMOL/L (ref 3.5–5)
POTASSIUM SERPL-SCNC: 4 MMOL/L (ref 3.5–5)
RBC # BLD: 4.56 E12/L (ref 3.8–5.8)
RBC # BLD: 4.66 E12/L (ref 3.8–5.8)
SODIUM BLD-SCNC: 137 MMOL/L (ref 132–146)
SODIUM BLD-SCNC: 139 MMOL/L (ref 132–146)
TOTAL PROTEIN: 6.3 G/DL (ref 6.4–8.3)
TOTAL PROTEIN: 6.4 G/DL (ref 6.4–8.3)
WBC # BLD: 7.1 E9/L (ref 4.5–11.5)
WBC # BLD: 8.5 E9/L (ref 4.5–11.5)

## 2021-09-29 PROCEDURE — 99233 SBSQ HOSP IP/OBS HIGH 50: CPT | Performed by: INTERNAL MEDICINE

## 2021-09-29 PROCEDURE — 2580000003 HC RX 258: Performed by: INTERNAL MEDICINE

## 2021-09-29 PROCEDURE — 85025 COMPLETE CBC W/AUTO DIFF WBC: CPT

## 2021-09-29 PROCEDURE — 6370000000 HC RX 637 (ALT 250 FOR IP): Performed by: STUDENT IN AN ORGANIZED HEALTH CARE EDUCATION/TRAINING PROGRAM

## 2021-09-29 PROCEDURE — 80053 COMPREHEN METABOLIC PANEL: CPT

## 2021-09-29 PROCEDURE — 2580000003 HC RX 258: Performed by: UROLOGY

## 2021-09-29 PROCEDURE — 83735 ASSAY OF MAGNESIUM: CPT

## 2021-09-29 PROCEDURE — 36415 COLL VENOUS BLD VENIPUNCTURE: CPT

## 2021-09-29 PROCEDURE — 6360000002 HC RX W HCPCS: Performed by: INTERNAL MEDICINE

## 2021-09-29 PROCEDURE — 1200000000 HC SEMI PRIVATE

## 2021-09-29 PROCEDURE — 6370000000 HC RX 637 (ALT 250 FOR IP): Performed by: INTERNAL MEDICINE

## 2021-09-29 PROCEDURE — 6360000002 HC RX W HCPCS: Performed by: STUDENT IN AN ORGANIZED HEALTH CARE EDUCATION/TRAINING PROGRAM

## 2021-09-29 PROCEDURE — 82962 GLUCOSE BLOOD TEST: CPT

## 2021-09-29 RX ORDER — SODIUM CHLORIDE 0.9 % (FLUSH) 0.9 %
5-40 SYRINGE (ML) INJECTION PRN
Status: DISCONTINUED | OUTPATIENT
Start: 2021-09-29 | End: 2021-09-30 | Stop reason: HOSPADM

## 2021-09-29 RX ORDER — TAMSULOSIN HYDROCHLORIDE 0.4 MG/1
0.8 CAPSULE ORAL NIGHTLY
Status: DISCONTINUED | OUTPATIENT
Start: 2021-09-29 | End: 2021-10-01 | Stop reason: HOSPADM

## 2021-09-29 RX ORDER — KETOROLAC TROMETHAMINE 30 MG/ML
15 INJECTION, SOLUTION INTRAMUSCULAR; INTRAVENOUS EVERY 8 HOURS PRN
Status: DISCONTINUED | OUTPATIENT
Start: 2021-09-29 | End: 2021-10-01 | Stop reason: HOSPADM

## 2021-09-29 RX ORDER — METOPROLOL SUCCINATE 25 MG/1
25 TABLET, EXTENDED RELEASE ORAL ONCE
Status: COMPLETED | OUTPATIENT
Start: 2021-09-29 | End: 2021-09-29

## 2021-09-29 RX ORDER — HYDRALAZINE HYDROCHLORIDE 20 MG/ML
10 INJECTION INTRAMUSCULAR; INTRAVENOUS EVERY 6 HOURS PRN
Status: DISCONTINUED | OUTPATIENT
Start: 2021-09-29 | End: 2021-10-01 | Stop reason: HOSPADM

## 2021-09-29 RX ORDER — SODIUM CHLORIDE 9 MG/ML
25 INJECTION, SOLUTION INTRAVENOUS PRN
Status: DISCONTINUED | OUTPATIENT
Start: 2021-09-29 | End: 2021-09-30 | Stop reason: HOSPADM

## 2021-09-29 RX ORDER — METOPROLOL SUCCINATE 50 MG/1
50 TABLET, EXTENDED RELEASE ORAL DAILY
Status: DISCONTINUED | OUTPATIENT
Start: 2021-09-30 | End: 2021-10-01 | Stop reason: HOSPADM

## 2021-09-29 RX ORDER — GABAPENTIN 300 MG/1
600 CAPSULE ORAL NIGHTLY
Status: DISCONTINUED | OUTPATIENT
Start: 2021-09-29 | End: 2021-10-01 | Stop reason: HOSPADM

## 2021-09-29 RX ORDER — SODIUM CHLORIDE 0.9 % (FLUSH) 0.9 %
5-40 SYRINGE (ML) INJECTION EVERY 12 HOURS SCHEDULED
Status: DISCONTINUED | OUTPATIENT
Start: 2021-09-29 | End: 2021-09-30 | Stop reason: HOSPADM

## 2021-09-29 RX ORDER — CIPROFLOXACIN 2 MG/ML
400 INJECTION, SOLUTION INTRAVENOUS
Status: DISCONTINUED | OUTPATIENT
Start: 2021-09-29 | End: 2021-09-29

## 2021-09-29 RX ORDER — CIPROFLOXACIN 2 MG/ML
400 INJECTION, SOLUTION INTRAVENOUS
Status: COMPLETED | OUTPATIENT
Start: 2021-09-30 | End: 2021-09-30

## 2021-09-29 RX ADMIN — TAMSULOSIN HYDROCHLORIDE 0.8 MG: 0.4 CAPSULE ORAL at 22:03

## 2021-09-29 RX ADMIN — SODIUM CHLORIDE: 9 INJECTION, SOLUTION INTRAVENOUS at 15:04

## 2021-09-29 RX ADMIN — LEVOTHYROXINE SODIUM 25 MCG: 25 TABLET ORAL at 05:55

## 2021-09-29 RX ADMIN — KETOROLAC TROMETHAMINE 15 MG: 30 INJECTION, SOLUTION INTRAMUSCULAR; INTRAVENOUS at 18:35

## 2021-09-29 RX ADMIN — METOPROLOL SUCCINATE 25 MG: 25 TABLET, EXTENDED RELEASE ORAL at 09:58

## 2021-09-29 RX ADMIN — INSULIN LISPRO 1 UNITS: 100 INJECTION, SOLUTION INTRAVENOUS; SUBCUTANEOUS at 21:34

## 2021-09-29 RX ADMIN — HYDRALAZINE HYDROCHLORIDE 10 MG: 20 INJECTION INTRAMUSCULAR; INTRAVENOUS at 22:55

## 2021-09-29 RX ADMIN — INSULIN LISPRO 2 UNITS: 100 INJECTION, SOLUTION INTRAVENOUS; SUBCUTANEOUS at 11:39

## 2021-09-29 RX ADMIN — GABAPENTIN 600 MG: 300 CAPSULE ORAL at 21:34

## 2021-09-29 RX ADMIN — SODIUM CHLORIDE, PRESERVATIVE FREE 10 ML: 5 INJECTION INTRAVENOUS at 21:35

## 2021-09-29 RX ADMIN — INSULIN GLARGINE 10 UNITS: 100 INJECTION, SOLUTION SUBCUTANEOUS at 21:35

## 2021-09-29 RX ADMIN — METOPROLOL SUCCINATE 25 MG: 25 TABLET, EXTENDED RELEASE ORAL at 12:02

## 2021-09-29 ASSESSMENT — PAIN SCALES - GENERAL
PAINLEVEL_OUTOF10: 0
PAINLEVEL_OUTOF10: 5

## 2021-09-29 ASSESSMENT — LIFESTYLE VARIABLES: SMOKING_STATUS: 0

## 2021-09-29 NOTE — CONSULTS
9/29/2021 8:19 AM  Service: Urology  Group: [facility] ([facility])    Daisy Rodriguez  [id number]     Chief Complaint: Left distal ureteral calculi    History of Present Illness: The patient is a 73 y.o. male patient who presented to the hospital yesterday complaints of left flank pain. CT abdomen pelvis was performed that showed a 0.4 cm left distal ureteral calculi. He was noted to have DELORIS with a serum creatinine of 1.7. He reports history of kidney stones for which he underwent ureteroscopy and laser lithotripsy at the [place] years ago. He currently denies any fever or chills. His pain is controlled. His wife is present. Past Medical History:   Diagnosis Date    Arthritis     Coronary artery disease due to lipid rich plaque 5/29/2019    Diabetes mellitus ([address]     Hypercholesteremia     Hypertension     Squamous cell carcinoma     Bottom lip had cancer cut off    Thyroid disease        Past Surgical History:   Procedure Laterality Date    CARDIAC SURGERY      may 2019 stents x2    CARPAL TUNNEL RELEASE      bilateral    CHOLECYSTECTOMY, LAPAROSCOPIC N/A 6/3/2021    LAPAROSCOPIC ROBOTIC ASSISTED SUBTOTALCHOLECYSTECTOMY performed by Blossom Green MD at [facility]  04/08/2019    Reso;zafar Macho 2.25 x15mm and resolute macho 2.25x8 to 1st diagonal artery by DR Aruna Bernal      for his cancer on his lower lip    FRACTURE SURGERY      Fractured left ankle times two    FRACTURE SURGERY      broke 2 transverse processes in his back    HEMORRHOID SURGERY      SHOULDER SURGERY      right    SKIN BIOPSY      TONSILLECTOMY         Medications Prior to Admission:    Medications Prior to Admission: gabapentin (NEURONTIN) 300 MG capsule, Take 600 mg by mouth nightly.   Cholecalciferol (VITAMIN D3) 5000 units TABS, Take by mouth  Glucosamine HCl (GLUCOSAMINE MAXIMUM STRENGTH) 1500 MG TABS, Take by mouth daily Indications: contains msm  aspirin 81 MG EC tablet, Take 1 tablet by mouth daily  metoprolol succinate (TOPROL XL) 25 MG extended release tablet, Take 0.5 tablets by mouth daily (Patient taking differently: Take 12.5 mg by mouth 2 times daily )  lisinopril (PRINIVIL;ZESTRIL) 40 MG tablet, Take 1 tablet by mouth daily  levothyroxine (SYNTHROID) 25 MCG tablet, Take 25 mcg by mouth Daily  tamsulosin (FLOMAX) 0.4 MG capsule, Take 0.8 mg by mouth daily  GLIPIZIDE PO, Take 10 mg by mouth daily   metFORMIN (GLUCOPHAGE) 1000 MG tablet, Take 1,000 mg by mouth 2 times daily (with meals)   [DISCONTINUED] clopidogrel (PLAVIX) 75 MG tablet, Take 1 tablet by mouth daily  [DISCONTINUED] hydrALAZINE (APRESOLINE) 25 MG tablet, Take 1 tablet by mouth 3 times daily  nitroGLYCERIN (NITROSTAT) 0.4 MG SL tablet, up to max of 3 total doses. If no relief after 1 dose, call 911. [DISCONTINUED] amLODIPine (NORVASC) 10 MG tablet, Take 1 tablet by mouth daily  [DISCONTINUED] ketotifen ( KETOTIFEN FUMARATE) 0.025 % ophthalmic solution, Place 1 drop into both eyes 2 times daily  [DISCONTINUED] ezetimibe (ZETIA) 10 MG tablet, Take 10 mg by mouth daily Patient can not take regular statins    Allergies:    Statins    Social History:    reports that he has quit smoking. He quit smokeless tobacco use about 32 years ago. He reports that he does not drink alcohol and does not use drugs. Family History:   Non-contributory to this urological problem  family history includes Alzheimer's Disease in his mother; Cancer in his sister; Stroke in his father.     Review of Systems:  Respiratory: negative for cough and hemoptysis  Cardiovascular: negative for chest pain and dyspnea  Gastrointestinal: negative for abdominal pain, diarrhea, nausea and vomiting  Derm: negative for rash and skin lesion(s)  Neurological: negative for seizures and tremors  Endocrine: negative for diabetic symptoms including polydipsia and polyuria  : As above in the HPI, otherwise negative  All other reviews are negative    Physical Exam:   Vitals: BP (!) 198/86   Pulse 61   Temp 98 °F (36.7 °C) (Oral)   Resp 16   Ht 5' 8\" (1.727 m)   Wt 200 lb (90.7 kg)   SpO2 93%   BMI 30.41 kg/m²   General:  Awake, alert, oriented X 3. Well developed, well nourished, well groomed. No apparent distress. HEENT:  Normocephalic, atraumatic. Pupils equal, round. No scleral icterus. No conjunctival injection. Normal lips, teeth, and gums. No nasal discharge. Neck:  Supple, no masses. Heart:  RRR  Lungs:  No audible wheezing. Respirations symmetric and non-labored. Abdomen:  soft, nontender, no masses, no organomegaly, no peritoneal signs  Extremities:  No clubbing, cyanosis, or edema  Skin:  Warm and dry, no open lesions or rashes  Neuro:  Cranial nerves 2-12 intact, no focal deficits  Rectal: deferred  Genitalia:  Perea no    Labs:   Recent Labs     09/28/21  1830 09/29/21  0120   WBC 8.1 7.1   RBC 4.88 4.66   HGB 13.9 13.1   HCT 41.5 38.9   MCV 85.0 83.5   MCH 28.5 28.1   MCHC 33.5 33.7   RDW 13.4 13.3    244   MPV 9.9 9.3       Recent Labs     09/28/21  1830 09/29/21  0120   CREATININE 1.7* 1.6*       Images:  Narrative   EXAMINATION:   CT OF THE ABDOMEN AND PELVIS WITHOUT CONTRAST 9/28/2021 6:43 pm       TECHNIQUE:   CT of the abdomen and pelvis was performed without the administration of   intravenous contrast. Multiplanar reformatted images are provided for review. Dose modulation, iterative reconstruction, and/or weight based adjustment of   the mA/kV was utilized to reduce the radiation dose to as low as reasonably   achievable.       COMPARISON:   06/01/2021       HISTORY:   ORDERING SYSTEM PROVIDED HISTORY: L FLank pain   TECHNOLOGIST PROVIDED HISTORY:   Reason for exam:->L FLank pain   Additional Contrast?->None   Decision Support Exception - unselect if not a suspected or confirmed   emergency medical condition->Emergency Medical Condition (MA)       FINDINGS:   Lower Chest: Lung bases clear.  Atherosclerosis of the coronary arteries   noted.       Organs: Minimal diffuse hepatic steatosis.  Punctate calcified splenic   granulomas.  Unremarkable pancreas and adrenals on this unenhanced study. Interval cholecystectomy.  Tiny calcifications in the expected location of   distal CBD.  MRCP may be obtained to evaluate for possible   choledocholithiasis if clinically indicated.  Bilateral renal cortical   hypodensities measuring up to 4 cm.  These are likely simple cysts. Bilateral renal stones measuring up to 0.4 cm in the lower pole of the right   kidney.  A 0.4 cm stone in the distal left ureter at the level of the pelvic   brim causing mild upstream hydroureteronephrosis.       GI/Bowel: Distal colonic diverticulosis.  No acute diverticulitis.  Bowel   loops nonobstructed.  Normal appendix.       Pelvis: Markedly enlarged prostate gland causing mass effect on the neck of   the urinary bladder.  Prostatic calcifications noted.  No radiopaque stone in   the urinary bladder.       Peritoneum/Retroperitoneum: No free air or free fluid.  No adenopathy. Vascular calcification with no abdominal aortic aneurysm.       Bones/Soft Tissues: Multilevel thoracolumbar spondylosis.  Mild-to-moderate   osteoarthritis of the bilateral hip joints.           Impression   Interval cholecystectomy.       Questionable distal choledocholithiasis.  MRCP may be obtained confirm if   clinically indicated.       A 0.4 cm stone in the distal left ureter at the level of the pelvic brim   causing mild upstream hydroureteronephrosis.       Multiple small bilateral renal stones.       Markedly enlarged prostate.  Correlation with rectal exam and serum PSA   levels recommended.       Distal colonic diverticulosis.  No acute diverticulitis.                  Assessment: Kulwant Adames 73 y.o. male   0.4 cm left distal ureteral calculi  Left hydronephrosis  Bilateral nonobstructing renal calculi  Left renal cyst  DELORIS    Plan:    Continue to watch the creatinine  Continue the IV fluids  Strain the urine  N.p.o. after midnight  Treatment consent placed for cystoscopy, retrograde pyelogram, ureteroscopy, laser lithotripsy, left stent insertion to be done tomorrow  Consented to the above  Cipro on-call to the OR  We will continue to follow    DO [facility]  Urology

## 2021-09-29 NOTE — PLAN OF CARE
Problem: Pain:  Goal: Pain level will decrease  Description: Pain level will decrease  9/29/2021 1300 by Yessenia Alves RN  Outcome: Met This Shift     Problem: Pain:  Goal: Control of acute pain  Description: Control of acute pain  9/29/2021 1300 by Yessenia Alves RN  Outcome: Met This Shift     Problem: Pain:  Goal: Control of chronic pain  Description: Control of chronic pain  Outcome: Met This Shift     Problem: Urinary Retention:  Goal: Urinary elimination within specified parameters  Description: Urinary elimination within specified parameters  Outcome: Met This Shift  Goal: Able to perform urinary catheter care  Description: Able to perform urinary catheter care  Outcome: Met This Shift  Goal: Able to perform urinary self-catheterization  Description: Able to perform urinary self-catheterization  Outcome: Met This Shift  Goal: Ability to reestablish a normal urinary elimination pattern will improve - after catheter removal  Description: Ability to reestablish a normal urinary elimination pattern will improve - after catheter removal  Outcome: Met This Shift  Goal: Ability to recognize the need to void and respond appropriately will improve  Description: Ability to recognize the need to void and respond appropriately will improve  9/29/2021 1300 by Yessenia Alves RN  Outcome: Met This Shift  9/28/2021 2349 by Darryle Pickle, RN  Outcome: Met This Shift  Goal: Absence of postvoid residual urine  Description: Absence of postvoid residual urine  Outcome: Met This Shift

## 2021-09-29 NOTE — ANESTHESIA PRE PROCEDURE
Department of Anesthesiology  Preprocedure Note       Name:  Sean Greene   Age:  73 y.o.  :  1950                                          MRN:  [id number]         Date:  2021      Surgeon: Savana Payan):  Lily Mohan MD    Procedure: Procedure(s):  CYSTOSCOPY RETROGRADE PYELOGRAM URETEROSCOPY LASER LITHOTRIPSY LEFT (DR  MOVE UP IF POSSIBLE) STAFF FROM OR 3    Medications prior to admission:   Prior to Admission medications    Medication Sig Start Date End Date Taking? Authorizing Provider   gabapentin (NEURONTIN) 300 MG capsule Take 600 mg by mouth nightly. Yes Historical Provider, MD   Cholecalciferol (VITAMIN D3) 5000 units TABS Take by mouth   Yes Historical Provider, MD   Glucosamine HCl (GLUCOSAMINE MAXIMUM STRENGTH) 1500 MG TABS Take by mouth daily Indications: contains msm   Yes Historical Provider, MD   aspirin 81 MG EC tablet Take 1 tablet by mouth daily 19  Yes Severa Oliva, MD   metoprolol succinate (TOPROL XL) 25 MG extended release tablet Take 0.5 tablets by mouth daily  Patient taking differently: Take 12.5 mg by mouth 2 times daily  19  Yes Severa Oliva, MD   lisinopril (PRINIVIL;ZESTRIL) 40 MG tablet Take 1 tablet by mouth daily 4/10/19  Yes Severa Oliva, MD   levothyroxine (SYNTHROID) 25 MCG tablet Take 25 mcg by mouth Daily   Yes Historical Provider, MD   tamsulosin (FLOMAX) 0.4 MG capsule Take 0.8 mg by mouth daily   Yes Historical Provider, MD   GLIPIZIDE PO Take 10 mg by mouth daily    Yes Historical Provider, MD   metFORMIN (GLUCOPHAGE) 1000 MG tablet Take 1,000 mg by mouth 2 times daily (with meals)    Yes Historical Provider, MD   nitroGLYCERIN (NITROSTAT) 0.4 MG SL tablet up to max of 3 total doses.  If no relief after 1 dose, call 911. 19   Severa Oliva, MD       Current medications:    Current Facility-Administered Medications   Medication Dose Route Frequency Provider Last Rate Last Admin    sodium chloride flush 0.9 % injection 5-40 mL  5-40 mL IntraVENous 2 times per day Rosario  A Marques, DO        sodium chloride flush 0.9 % injection 5-40 mL  5-40 mL IntraVENous PRN Mauricio A Marques, DO        0.9 % sodium chloride infusion  25 mL IntraVENous PRN Mauricio A Marques, DO        [START ON 9/30/2021] metoprolol succinate (TOPROL XL) extended release tablet 50 mg  50 mg Oral Daily David Kwon MD        hydrALAZINE (APRESOLINE) injection 10 mg  10 mg IntraVENous Q6H PRN David Kwon MD       Ryan Junior [START ON 9/30/2021] ciprofloxacin (CIPRO) IVPB 400 mg  400 mg IntraVENous On Call to OR Mauricio A Marques, DO        amLODIPine (NORVASC) tablet 10 mg  10 mg Oral Daily Dina Quezada MD        aspirin EC tablet 81 mg  81 mg Oral Daily Vira Schmidt MD        [Held by provider] clopidogrel (PLAVIX) tablet 75 mg  75 mg Oral Daily Vira Schmidt MD        ezetimibe (ZETIA) tablet 10 mg  10 mg Oral Daily Dina Quezada MD        hydrALAZINE (APRESOLINE) tablet 25 mg  25 mg Oral TID Vira Schmidt MD        levothyroxine (SYNTHROID) tablet 25 mcg  25 mcg Oral Daily Dina Quezada MD   25 mcg at 09/29/21 0555    [Held by provider] lisinopril (PRINIVIL;ZESTRIL) tablet 40 mg  40 mg Oral Daily Dina Quezada MD        tamsulosin [facility]) capsule 0.8 mg  0.8 mg Oral Daily Dina Quezada MD        sodium chloride flush 0.9 % injection 5-40 mL  5-40 mL IntraVENous 2 times per day [facility] Deborah Lyon MD        sodium chloride flush 0.9 % injection 5-40 mL  5-40 mL IntraVENous PRN Dina Zheng MD        0.9 % sodium chloride infusion  25 mL IntraVENous PRN Dina Zheng MD        enoxaparin (LOVENOX) injection 40 mg  40 mg SubCUTAneous Daily Dina Zheng MD        ondansetron (ZOFRAN-ODT) disintegrating tablet 4 mg  4 mg Oral Q8H PRN Dina Zheng MD        Or    ondansetron [facility]) injection 4 mg  4 mg IntraVENous Q6H PRN Dina Quezada MD        polyethylene glycol (GLYCOLAX) packet 17 g  17 g Oral Daily PRN Dina Berger MD        acetaminophen (TYLENOL) tablet 650 mg  650 mg Oral Q6H PRN Dina Berger MD        Or    acetaminophen (TYLENOL) suppository 650 mg  650 mg Rectal Q6H PRN Dina Zheng MD        0.9 % sodium chloride infusion   IntraVENous Continuous Dina Berger MD 75 mL/hr at 09/28/21 2357 New Bag at 09/28/21 2357    potassium chloride 10 mEq/100 mL IVPB (Peripheral Line)  10 mEq IntraVENous PRN Dina Berger MD        magnesium sulfate 2000 mg in 50 mL IVPB premix  2,000 mg IntraVENous PRN Dina Berger MD        morphine (PF) injection 2 mg  2 mg IntraVENous Q4H PRN Dina Zheng MD        insulin glargine (LANTUS) injection vial 10 Units  10 Units SubCUTAneous Nightly Dina Berger MD   10 Units at 09/28/21 2356    insulin lispro (HUMALOG) injection vial 0-12 Units  0-12 Units SubCUTAneous TID WC Dina Berger MD   2 Units at 09/29/21 1139    insulin lispro (HUMALOG) injection vial 0-6 Units  0-6 Units SubCUTAneous Nightly Dina Berger MD   1 Units at 09/28/21 2356       Allergies: Allergies   Allergen Reactions    Statins      States he is intolerant        Problem List:    Patient Active Problem List   Diagnosis Code    Chest pain R07.9    Elevated lipase R74.8    Accelerated hypertension I10    Uncontrolled type 2 diabetes mellitus with hyperglycemia ([facility] [address] E11.65    Incomplete right bundle branch block I45.10    Headache R51.9    Obesity (BMI 30-39. 9) E66.9    Class 1 obesity due to excess calories in adult E66.09    Chest pain due to myocardial ischemia I25.9    Unstable angina (HCC) I20.0    Coronary artery disease due to lipid rich plaque I25.10, I25.83    RLQ abdominal pain R10.31    Right upper quadrant abdominal pain R10.11    Nausea and vomiting R11.2    DELORIS (acute kidney injury) ([facility] [address] N17.9       Past Medical History: Diagnosis Date    Arthritis     Coronary artery disease due to lipid rich plaque 5/29/2019    Diabetes mellitus (HCC)     Hypercholesteremia     Hypertension     Squamous cell carcinoma     Bottom lip had cancer cut off    Thyroid disease        Past Surgical History:        Procedure Laterality Date    CARDIAC SURGERY      may 2019 stents x2    CARPAL TUNNEL RELEASE      bilateral    CHOLECYSTECTOMY, LAPAROSCOPIC N/A 6/3/2021    LAPAROSCOPIC ROBOTIC ASSISTED SUBTOTALCHOLECYSTECTOMY performed by Radha De Anda MD at [facility]  04/08/2019    [place]o;[place] 2.25 x15mm and resolute macho 2.25x8 to 1st diagonal artery by DR Aruna Bernal      for his cancer on his lower lip    FRACTURE SURGERY      Fractured left ankle times two    FRACTURE SURGERY      broke 2 transverse processes in his back    HEMORRHOID SURGERY      SHOULDER SURGERY      right    SKIN BIOPSY      TONSILLECTOMY         Social History:    Social History     Tobacco Use    Smoking status: Former Smoker    Smokeless tobacco: Former User     Quit date: 10/10/1988    Tobacco comment: Quit years ago when in [employer]   Substance Use Topics    Alcohol use: No                                Counseling given: Not Answered  Comment: Quit years ago when in [facility] [address] Signs (Current):   Vitals:    09/29/21 0330 09/29/21 0744 09/29/21 1130 09/29/21 1200   BP:  (!) 198/86 (!) 160/68    Pulse:  61  62   Resp:  16     Temp:  36.7 °C (98 °F)     TempSrc:  Oral     SpO2:  93%     Weight: 200 lb (90.7 kg)      Height:                                                  BP Readings from Last 3 Encounters:   09/29/21 (!) 160/68   06/06/21 (!) 153/72   06/03/21 (!) 229/105       NPO Status: Pt instructed nothing to eat or drink 0000 DOS.     BMI:   Wt Readings from Last 3 Encounters:   09/29/21 200 lb (90.7 kg)   06/03/21 218 lb 7.6 oz (99.1 kg)   10/31/19 209 lb (94.8 kg)     Body mass index is 30.41 kg/m². CBC:   Lab Results   Component Value Date    WBC 7.1 09/29/2021    RBC 4.66 09/29/2021    HGB 13.1 09/29/2021    HCT 38.9 09/29/2021    MCV 83.5 09/29/2021    RDW 13.3 09/29/2021     09/29/2021       CMP:   Lab Results   Component Value Date     09/29/2021    K 3.5 09/29/2021     09/29/2021    CO2 23 09/29/2021    BUN 19 09/29/2021    CREATININE 1.6 09/29/2021    GFRAA 52 09/29/2021    LABGLOM 43 09/29/2021    GLUCOSE 163 09/29/2021    PROT 6.4 09/29/2021    CALCIUM 8.5 09/29/2021    BILITOT 0.4 09/29/2021    ALKPHOS 75 09/29/2021    AST 18 09/29/2021    ALT 13 09/29/2021       POC Tests: No results for input(s): POCGLU, POCNA, POCK, POCCL, POCBUN, POCHEMO, POCHCT in the last 72 hours. Coags:   Lab Results   Component Value Date    PROTIME 19.7 06/03/2021    INR 1.8 06/03/2021    APTT 29.8 05/14/2019       HCG (If Applicable): No results found for: PREGTESTUR, PREGSERUM, HCG, HCGQUANT     ABGs: No results found for: PHART, PO2ART, BHP6RQR, OEE2YMG, BEART, G7PMJRAH     Type & Screen (If Applicable):  No results found for: LABABO, LABRH    Drug/Infectious Status (If Applicable):  No results found for: HIV, HEPCAB    COVID-19 Screening (If Applicable): No results found for: COVID19    EKG 6/1/21  Narrative & Impression    Normal sinus rhythm with sinus arrhythmia  Normal ECG  Confirmed by Clifton Ram ([id number]) on 6/2/2021 6:57:02 AM       Cardiac Catheterization 4/5/2019  DATE OF PROCEDURE:  04/08/2019     PROCEDURE:  1. Left heart catheterization.   2.  Percutaneous coronary intervention with sequential and overlapping  2.25 x 15 and 2.25 x 8 mm drug-eluting stents to the ostial and proximal  first diagonal.     PHYSICIAN:  Donelda Goodpasture, DO     INDICATIONS:  Unstable angina.     COMPLICATIONS:  None.     SEDATION:  Intravenous Versed.     SEDATION TIME:  I was present for the sedation administration at 09:42  and I ended sedation at 10:48 for a total face-to-face sedation time of  1 hour and 6 minutes.     HEMODYNAMIC RESULTS:  1. Opening aortic pressure 122/59 with a mean of 79.  2.  Left ventricular systolic pressure 964.  3.  LVEDP 9.  4.  No significant gradient across the aortic valve.     ANGIOGRAPHIC RESULTS:  Left main:  Tortuous. No angiographically significant stenosis. LAD:  Tortuous. Mild diffuse luminal irregularities. D1:  Ostial diffuse greater than 95% stenosis. Tortuous vessel. CIRCUMFLEX:  Mild diffuse luminal irregularities. OM1:  The ostial proximal 10 to 20%, proximal diffuse 30% stenosis. Right coronary artery:  Dominant vessel. Minimal diffuse luminal  irregularities.     LEFT VENTRICULOGRAM:  Normal LV size and systolic function. No wall  motion abnormalities. Ejection fraction 65%.     INTERVENTION RESULTS:  Successful pre-dilatation of the ostial and  proximal diagonal with 2.25 mm balloon.     Successful stenting of the ostial and proximal diagonal with a 2.25 x 15  mm [place], drug-eluting stent. There was residual lesion distal to the  stent that was not covered. Therefore, this was covered with a 2.25 x 8  mm Jett, drug-eluting stent in an overlapping fashion. The overlapping  portions were then postdilated with the stent balloon. This all  resulted in 0% residual stenosis and excellent BERNARD-3 flow.     SUMMARY OF THE PROCEDURE:  After obtaining informed consent, the patient  was taken to the cardiac cath lab where the area of the right radial  artery was prepped and draped in sterile fashion. Using a micropuncture  technique, 6-Slovenian Slender Glidesheath was placed in the right radial  artery. It was aspirated and flushed several times throughout the  procedure. He was given heparin systemically. A radial artery was  medicated with verapamil and nitroglycerin. A 5-Slovenian TIG catheter was  advanced over wire to the root of the aorta. It was aspirated and  flushed with saline. Pressures were obtained.   This was then filled  with contrast and manipulated in the left main coronary artery. Four  orthogonal views obtained. This was then removed, and an R4 was  advanced and manipulated in the right coronary artery. Three orthogonal  views obtained. The catheter was removed and a 5-Lao angled pig tail  was advanced into the left ventricle. This was actually advanced into  the left ventricle before we went up with the R-4 catheter because the  wire slipped into the ventricle. Catheter was aspirated and flushed  with saline. Pressures were obtained. An SOMMER projection was obtained. Catheter was aspirated and flushed with saline once again. Pullback  pressures across the aortic valve were obtained. Then the R4 was  advanced and manipulated in the right coronary artery. He was  anticoagulated to maintain ACT greater 250. The 6-Lao Q3.5 guiding  catheter was advanced and manipulated in the left main coronary artery. He was premedicated with aspirin and was given a Brilinta loading dose. A Luge wire was successfully manipulated across the diagonal lesion into  the distal diagonal.  The lesion was then crossed and predilated with a  2.25 mm balloon. Lesion was then crossed and stented with a 2.25 x 15  mm Jett, drug-eluting stent to 12 atmospheres of pressure. Subsequent  angiograms demonstrated that there was still some residual lesion distal  to the stent. Intracoronary nitroglycerin was administered which did  improve this, but there still appeared to be lesion there. Therefore  2.25 x 8 mm [place], drug-eluting stent was passed through the first stent  and placed in an overlapping fashion distal to the first stent. This  was inflated to 12 atmospheres of pressure. The stent balloon was then  pulled back and the overlapping portions of the stent was then  postdilated with the stent balloon to 14 atmospheres of pressure. The  balloon wire removed, and followup angiograms were performed.   This  demonstrated 0% residual stenosis and excellent BERNARD-3 flow. The right  radial artery sheath had been removed, and TR band placed successfully  with good patent hemostasis. He tolerated procedure well with no  complications.           Denverer GitelmanDO     D: 04/08/2019 11:19:51       T: 04/08/2019 11:23:00     [email or web address]  Job#: [id number]     Doc#: [id number]    Anesthesia Evaluation  Patient summary reviewed and Nursing notes reviewed no history of anesthetic complications:   Airway: Mallampati: III  TM distance: >3 FB   Neck ROM: full  Mouth opening: > = 3 FB Dental:    (+) partials      Pulmonary:Negative Pulmonary ROS       (-) not a current smoker          Patient did not smoke on day of surgery. Cardiovascular:  Exercise tolerance: good (>4 METS),   (+) hypertension:, CAD:, CABG/stent:,     (-)  angina (Had two stents placed in 2019, no chest pain since. )    ECG reviewed  Rhythm: regular  Rate: normal           Beta Blocker:  Dose within 24 Hrs         Neuro/Psych:   Negative Neuro/Psych ROS  (+) headaches:,             GI/Hepatic/Renal:   (+) renal disease: kidney stones,           Endo/Other:    (+) DiabetesType II DM, , hypothyroidism, blood dyscrasia: anticoagulation therapy, arthritis: OA., malignancy/cancer. Abdominal:             Vascular: negative vascular ROS. Other Findings:             Anesthesia Plan      MAC     ASA 3       Induction: intravenous. Anesthetic plan and risks discussed with patient. Plan discussed with CRNA and attending.                 Raegan Lo RN   9/29/2021      Patient will need to be re-evaluated prior to surgery by DOS anesthesiologist.    Raegan Lo RN           9/29/2021        1:59 PM

## 2021-09-29 NOTE — PROGRESS NOTES
Pt. To retain wedding band on left fourth finger, see consent in chart.     Electronically signed by Sreekanth Leal RN on 9/29/2021 at 10:18 AM

## 2021-09-29 NOTE — PROGRESS NOTES
[facility]     Electronically signed by Caterina Laughlin RN on 9/29/2021 at 5:03 AM

## 2021-09-29 NOTE — PROGRESS NOTES
[facility] Quality Flow/Interdisciplinary Rounds Progress Note        Quality Flow Rounds held on September 29, 2021    Disciplines Attending:  Bedside Nurse, ,  and Nursing Unit Leadership    Yissel Morris was admitted on 9/28/2021  5:51 PM    Anticipated Discharge Date:  Expected Discharge Date: 10/01/21    Disposition:    Kannan Score:  Kannan Scale Score: 21    Readmission Risk              Risk of Unplanned Readmission:  15           Discussed patient goal for the day, patient clinical progression, and barriers to discharge.   The following Goal(s) of the Day/Commitment(s) have been identified:  Diagnostics - Report Results and Labs - Report Results, possible OR 9/29      Esme Myers, DIANA  September 29, 2021

## 2021-09-29 NOTE — PROGRESS NOTES
Notified Preop that pt. Already had breakfast - EARL Sheldon aware as well.   Electronically signed by Donnie Ribeiro RN on 9/29/2021 at 8:27 AM

## 2021-09-29 NOTE — PROGRESS NOTES
Unable to obtain bloodwork - IV team consult ordered.   Electronically signed by Reece Talbert RN on 9/29/2021 at 12:15 PM

## 2021-09-29 NOTE — H&P
smoking. He quit smokeless tobacco use about 32 years ago. ETOH:   reports no history of alcohol use. Family History:       Problem Relation Age of Onset    Alzheimer's Disease Mother     Stroke Father     Cancer Sister        REVIEW OF SYSTEMS:  Ten systems reviewed and negative except for as mentioned in the HPI  Review of Systems     Physical Exam:      Vitals: BP (!) 184/79   Pulse 60   Temp 97 °F (36.1 °C)   Resp 18   Ht 5' 8\" (1.727 m)   Wt 200 lb (90.7 kg)   SpO2 95%   BMI 30.41 kg/m²     Physical Exam     General appearance: alert, cooperative and no distress. Obese  Mental Status: oriented to person, place and time and normal affect  Lungs: clear to auscultation bilaterally, normal effort  Heart: regular rate and rhythm, no murmur  Abdomen: soft, nontender, nondistended, bowel sounds present, no masses  Extremities: no edema or redness  Skin: no gross lesions, rashes    Recent Labs     09/28/21  1830   WBC 8.1   HGB 13.9        Recent Labs     09/28/21  1830      K 3.8      CO2 23   BUN 19   CREATININE 1.7*   GLUCOSE 107*   AST 19   ALT 15   BILITOT 0.4   ALKPHOS 80     Troponin T: No results for input(s): TROPONINI in the last 72 hours. ABGs: No results found for: PHART, PO2ART, BSG3ZWF  INR: No results for input(s): INR in the last 72 hours.   URINALYSIS:  Recent Labs     09/28/21  1830   COLORU Yellow   PHUR 6.0   WBCUA 2-5   RBCUA 5-10*   BACTERIA RARE*   CLARITYU Clear   SPECGRAV 1.020   LEUKOCYTESUR Negative   UROBILINOGEN 0.2   BILIRUBINUR Negative   BLOODU MODERATE*   GLUCOSEU Negative     -----------------------------------------------------------------   CT ABDOMEN PELVIS WO CONTRAST Additional Contrast? None    Result Date: 9/28/2021  EXAMINATION: CT OF THE ABDOMEN AND PELVIS WITHOUT CONTRAST 9/28/2021 6:43 pm TECHNIQUE: CT of the abdomen and pelvis was performed without the administration of intravenous contrast. Multiplanar reformatted images are provided for review. Dose modulation, iterative reconstruction, and/or weight based adjustment of the mA/kV was utilized to reduce the radiation dose to as low as reasonably achievable. COMPARISON: 06/01/2021 HISTORY: ORDERING SYSTEM PROVIDED HISTORY: L FLank pain TECHNOLOGIST PROVIDED HISTORY: Reason for exam:->L FLank pain Additional Contrast?->None Decision Support Exception - unselect if not a suspected or confirmed emergency medical condition->Emergency Medical Condition (MA) FINDINGS: Lower Chest: Lung bases clear. Atherosclerosis of the coronary arteries noted. Organs: Minimal diffuse hepatic steatosis. Punctate calcified splenic granulomas. Unremarkable pancreas and adrenals on this unenhanced study. Interval cholecystectomy. Tiny calcifications in the expected location of distal CBD. MRCP may be obtained to evaluate for possible choledocholithiasis if clinically indicated. Bilateral renal cortical hypodensities measuring up to 4 cm. These are likely simple cysts. Bilateral renal stones measuring up to 0.4 cm in the lower pole of the right kidney. A 0.4 cm stone in the distal left ureter at the level of the pelvic brim causing mild upstream hydroureteronephrosis. GI/Bowel: Distal colonic diverticulosis. No acute diverticulitis. Bowel loops nonobstructed. Normal appendix. Pelvis: Markedly enlarged prostate gland causing mass effect on the neck of the urinary bladder. Prostatic calcifications noted. No radiopaque stone in the urinary bladder. Peritoneum/Retroperitoneum: No free air or free fluid. No adenopathy. Vascular calcification with no abdominal aortic aneurysm. Bones/Soft Tissues: Multilevel thoracolumbar spondylosis. Mild-to-moderate osteoarthritis of the bilateral hip joints. Interval cholecystectomy. Questionable distal choledocholithiasis. MRCP may be obtained confirm if clinically indicated.  A 0.4 cm stone in the distal left ureter at the level of the pelvic brim causing mild upstream hydroureteronephrosis. Multiple small bilateral renal stones. Markedly enlarged prostate. Correlation with rectal exam and serum PSA levels recommended. Distal colonic diverticulosis. No acute diverticulitis.            Assessment and Plan     *Left-sided obstructive uropathy with mild hydronephrosis due to 0.4 cm ureteral stone  *Acute kidney injury due to the above  *Hypertensive urgency  *Morbid obesity  *History of BPH  *History of type 2 diabetes mellitus    -Admit to [facility]  -Start patient on IV fluids of normal saline at 75 mils an hour  -Continue with his home dose Flomax  -Morphine 2 mg IV every 4 as needed for pain  -Neurology consult  -Daily BMP to monitor his kidney function  -For his hypertension will continue his home medications that include Toprol-XL/lisinopril/Norvasc and hydralazine, will do labetalol 10 mg IV as needed for systolic above 165  -We will hold his oral hypoglycemic agents and start patient on Lantus 10 units daily with sliding scale insulin    *DVT prophylaxis with Lovenox    Quirino Pete MD, MD  Admitting Hospitalist

## 2021-09-29 NOTE — PROGRESS NOTES
Jaja Yoon Hospitalist   Progress Note    Admitting Date and Time: 9/28/2021  5:51 PM  Admit Dx: DELORIS (acute kidney injury) ([facility] [address] [N17.9]    Seen for follow on problems as listed below. Subjective:  Denies CP ,sob ,n/v/d/abd pain. Left flank pain well controlled with current meds. D/w nursing. ROS: denies fever, chills, cp, sob, n/v, HA unless stated above.      sodium chloride flush  5-40 mL IntraVENous 2 times per day    ciprofloxacin  400 mg IntraVENous On Call to OR    amLODIPine  10 mg Oral Daily    aspirin  81 mg Oral Daily    clopidogrel  75 mg Oral Daily    ezetimibe  10 mg Oral Daily    hydrALAZINE  25 mg Oral TID    levothyroxine  25 mcg Oral Daily    lisinopril  40 mg Oral Daily    metoprolol succinate  25 mg Oral Daily    tamsulosin  0.8 mg Oral Daily    sodium chloride flush  5-40 mL IntraVENous 2 times per day    enoxaparin  40 mg SubCUTAneous Daily    insulin glargine  10 Units SubCUTAneous Nightly    insulin lispro  0-12 Units SubCUTAneous TID WC    insulin lispro  0-6 Units SubCUTAneous Nightly     sodium chloride flush, 5-40 mL, PRN  sodium chloride, 25 mL, PRN  sodium chloride flush, 5-40 mL, PRN  sodium chloride, 25 mL, PRN  ondansetron, 4 mg, Q8H PRN   Or  ondansetron, 4 mg, Q6H PRN  polyethylene glycol, 17 g, Daily PRN  acetaminophen, 650 mg, Q6H PRN   Or  acetaminophen, 650 mg, Q6H PRN  potassium chloride, 10 mEq, PRN  magnesium sulfate, 2,000 mg, PRN  morphine, 2 mg, Q4H PRN         Objective:    BP (!) 198/86   Pulse 61   Temp 98 °F (36.7 °C) (Oral)   Resp 16   Ht 5' 8\" (1.727 m)   Wt 200 lb (90.7 kg)   SpO2 93%   BMI 30.41 kg/m²   General Appearance: alert and oriented to person, place and time,in no acute distress  Skin: warm and dry  Head: normocephalic and atraumatic  Eyes: pupils equal, round, and reactive to light, extraocular eye movements intact, conjunctivae normal  Neck: supple and non-tender without mass  Pulmonary/Chest: clear to auscultation bilaterally  Cardiovascular: normal rate, regular rhythm, normal S1 and S2  Abdomen: soft, non-tender, non-distended, normal bowel sounds, no masses or organomegaly, no costovertebral angle tenderness. Extremities: no cyanosis, clubbing   Musculoskeletal: normal range of motion  Neurologic:  no cranial nerve deficit, speech normal      Recent Labs     09/28/21 1830 09/29/21  0120    139   K 3.8 3.5    106   CO2 23 23   BUN 19 19   CREATININE 1.7* 1.6*   GLUCOSE 107* 163*   CALCIUM 8.9 8.5*       Recent Labs     09/28/21 1830 09/29/21  0120   WBC 8.1 7.1   RBC 4.88 4.66   HGB 13.9 13.1   HCT 41.5 38.9   MCV 85.0 83.5   MCH 28.5 28.1   MCHC 33.5 33.7   RDW 13.4 13.3    244   MPV 9.9 9.3     Labs and images reviewed    Radiology:   CT ABDOMEN PELVIS WO CONTRAST Additional Contrast? None   Final Result   Interval cholecystectomy. Questionable distal choledocholithiasis. MRCP may be obtained confirm if   clinically indicated. A 0.4 cm stone in the distal left ureter at the level of the pelvic brim   causing mild upstream hydroureteronephrosis. Multiple small bilateral renal stones. Markedly enlarged prostate. Correlation with rectal exam and serum PSA   levels recommended. Distal colonic diverticulosis. No acute diverticulitis. Assessment:    Active Problems:    DELORIS (acute kidney injury) ([address]  Resolved Problems:    * No resolved hospital problems. *      Plan:  Left obstructive uropathy - CT  with mild hydronephrosis new 0.4 cm distal ureteric stone at the level of the pelvic brim. Continue IV fluids, analgesics and antiemetics as needed. Urology is consulted for intervention. DELORIS secondary to #1-on IV fluids, monitor. Hypertensive urgency-likely secondary to#1. Resume  Norvasc as per home. Will hold lisinopril secondary to DELORIS. Will increase Toprol-XL to 50 mg daily while in the hospital.  Hydralazine 25 mg 3 times daily added. Continue IV hydralazine as needed. Coronary artery disease s/p PCI in 2019-on Plavix,which is on hold for procedure, beta-blockers continued, ACE inhibitor is on hold secondary to DELORIS, continue aspirin and statin. Resume Plavix after procedure when okay with urology. BPH by history -Flomax continued. Type 2 diabetes-hold oral hypoglycemics, started on Lantus 10 units plus ISS, monitor blood sugars. On Lovenox for DVT prophylaxis  Full code.             Electronically signed by Sabino Mena MD on 9/29/2021 at 10:01 AM

## 2021-09-30 ENCOUNTER — APPOINTMENT (OUTPATIENT)
Dept: GENERAL RADIOLOGY | Age: 71
DRG: 661 | End: 2021-09-30
Payer: OTHER GOVERNMENT

## 2021-09-30 ENCOUNTER — ANESTHESIA (OUTPATIENT)
Dept: OPERATING ROOM | Age: 71
DRG: 661 | End: 2021-09-30
Payer: OTHER GOVERNMENT

## 2021-09-30 VITALS
SYSTOLIC BLOOD PRESSURE: 132 MMHG | OXYGEN SATURATION: 96 % | DIASTOLIC BLOOD PRESSURE: 69 MMHG | RESPIRATION RATE: 1 BRPM

## 2021-09-30 LAB
BACTERIA: ABNORMAL /HPF
BILIRUBIN URINE: NEGATIVE
BLOOD, URINE: ABNORMAL
CLARITY: CLEAR
COLOR: YELLOW
GLUCOSE URINE: NEGATIVE MG/DL
KETONES, URINE: NEGATIVE MG/DL
LEUKOCYTE ESTERASE, URINE: ABNORMAL
METER GLUCOSE: 126 MG/DL (ref 74–99)
METER GLUCOSE: 137 MG/DL (ref 74–99)
METER GLUCOSE: 144 MG/DL (ref 74–99)
METER GLUCOSE: 157 MG/DL (ref 74–99)
METER GLUCOSE: 159 MG/DL (ref 74–99)
NITRITE, URINE: NEGATIVE
PH UA: 6 (ref 5–9)
PROTEIN UA: NEGATIVE MG/DL
RBC UA: ABNORMAL /HPF (ref 0–2)
SPECIFIC GRAVITY UA: 1.01 (ref 1–1.03)
UROBILINOGEN, URINE: 0.2 E.U./DL
WBC UA: ABNORMAL /HPF (ref 0–5)

## 2021-09-30 PROCEDURE — C2617 STENT, NON-COR, TEM W/O DEL: HCPCS | Performed by: UROLOGY

## 2021-09-30 PROCEDURE — C1758 CATHETER, URETERAL: HCPCS | Performed by: UROLOGY

## 2021-09-30 PROCEDURE — 82365 CALCULUS SPECTROSCOPY: CPT

## 2021-09-30 PROCEDURE — 6360000002 HC RX W HCPCS: Performed by: UROLOGY

## 2021-09-30 PROCEDURE — 2720000010 HC SURG SUPPLY STERILE: Performed by: UROLOGY

## 2021-09-30 PROCEDURE — 6370000000 HC RX 637 (ALT 250 FOR IP): Performed by: FAMILY MEDICINE

## 2021-09-30 PROCEDURE — 1200000000 HC SEMI PRIVATE

## 2021-09-30 PROCEDURE — 6370000000 HC RX 637 (ALT 250 FOR IP): Performed by: UROLOGY

## 2021-09-30 PROCEDURE — 2580000003 HC RX 258: Performed by: UROLOGY

## 2021-09-30 PROCEDURE — 7100000000 HC PACU RECOVERY - FIRST 15 MIN: Performed by: UROLOGY

## 2021-09-30 PROCEDURE — 0T778DZ DILATION OF LEFT URETER WITH INTRALUMINAL DEVICE, VIA NATURAL OR ARTIFICIAL OPENING ENDOSCOPIC: ICD-10-PCS | Performed by: UROLOGY

## 2021-09-30 PROCEDURE — C1894 INTRO/SHEATH, NON-LASER: HCPCS | Performed by: UROLOGY

## 2021-09-30 PROCEDURE — 99232 SBSQ HOSP IP/OBS MODERATE 35: CPT | Performed by: FAMILY MEDICINE

## 2021-09-30 PROCEDURE — 87088 URINE BACTERIA CULTURE: CPT

## 2021-09-30 PROCEDURE — 3600000013 HC SURGERY LEVEL 3 ADDTL 15MIN: Performed by: UROLOGY

## 2021-09-30 PROCEDURE — 2580000003 HC RX 258: Performed by: NURSE ANESTHETIST, CERTIFIED REGISTERED

## 2021-09-30 PROCEDURE — 2709999900 HC NON-CHARGEABLE SUPPLY: Performed by: UROLOGY

## 2021-09-30 PROCEDURE — 2500000003 HC RX 250 WO HCPCS: Performed by: NURSE ANESTHETIST, CERTIFIED REGISTERED

## 2021-09-30 PROCEDURE — BT1F1ZZ FLUOROSCOPY OF LEFT KIDNEY, URETER AND BLADDER USING LOW OSMOLAR CONTRAST: ICD-10-PCS | Performed by: UROLOGY

## 2021-09-30 PROCEDURE — 3600000003 HC SURGERY LEVEL 3 BASE: Performed by: UROLOGY

## 2021-09-30 PROCEDURE — 7100000001 HC PACU RECOVERY - ADDTL 15 MIN: Performed by: UROLOGY

## 2021-09-30 PROCEDURE — 82962 GLUCOSE BLOOD TEST: CPT

## 2021-09-30 PROCEDURE — 6360000002 HC RX W HCPCS: Performed by: NURSE ANESTHETIST, CERTIFIED REGISTERED

## 2021-09-30 PROCEDURE — 88300 SURGICAL PATH GROSS: CPT

## 2021-09-30 PROCEDURE — 6360000002 HC RX W HCPCS: Performed by: STUDENT IN AN ORGANIZED HEALTH CARE EDUCATION/TRAINING PROGRAM

## 2021-09-30 PROCEDURE — C1769 GUIDE WIRE: HCPCS | Performed by: UROLOGY

## 2021-09-30 PROCEDURE — 6360000004 HC RX CONTRAST MEDICATION: Performed by: UROLOGY

## 2021-09-30 PROCEDURE — 81001 URINALYSIS AUTO W/SCOPE: CPT

## 2021-09-30 PROCEDURE — 6370000000 HC RX 637 (ALT 250 FOR IP): Performed by: INTERNAL MEDICINE

## 2021-09-30 PROCEDURE — 0TC78ZZ EXTIRPATION OF MATTER FROM LEFT URETER, VIA NATURAL OR ARTIFICIAL OPENING ENDOSCOPIC: ICD-10-PCS | Performed by: UROLOGY

## 2021-09-30 PROCEDURE — C1713 ANCHOR/SCREW BN/BN,TIS/BN: HCPCS | Performed by: UROLOGY

## 2021-09-30 PROCEDURE — 74420 UROGRAPHY RTRGR +-KUB: CPT

## 2021-09-30 PROCEDURE — 3700000000 HC ANESTHESIA ATTENDED CARE: Performed by: UROLOGY

## 2021-09-30 PROCEDURE — 3700000001 HC ADD 15 MINUTES (ANESTHESIA): Performed by: UROLOGY

## 2021-09-30 DEVICE — UNIVERSA FIRM URETERAL STENT AND POSITIONER WITH HYDROPHILIC COATING
Type: IMPLANTABLE DEVICE | Site: URETER | Status: FUNCTIONAL
Brand: UNIVERSA

## 2021-09-30 RX ORDER — SODIUM CHLORIDE 9 MG/ML
INJECTION, SOLUTION INTRAVENOUS CONTINUOUS PRN
Status: DISCONTINUED | OUTPATIENT
Start: 2021-09-30 | End: 2021-09-30 | Stop reason: SDUPTHER

## 2021-09-30 RX ORDER — GLYCOPYRROLATE 1 MG/5 ML
SYRINGE (ML) INTRAVENOUS PRN
Status: DISCONTINUED | OUTPATIENT
Start: 2021-09-30 | End: 2021-09-30 | Stop reason: SDUPTHER

## 2021-09-30 RX ORDER — NIFEDIPINE 30 MG/1
30 TABLET, FILM COATED, EXTENDED RELEASE ORAL DAILY
Status: DISCONTINUED | OUTPATIENT
Start: 2021-09-30 | End: 2021-10-01 | Stop reason: HOSPADM

## 2021-09-30 RX ORDER — FENTANYL CITRATE 50 UG/ML
25 INJECTION, SOLUTION INTRAMUSCULAR; INTRAVENOUS EVERY 5 MIN PRN
Status: DISCONTINUED | OUTPATIENT
Start: 2021-09-30 | End: 2021-09-30 | Stop reason: HOSPADM

## 2021-09-30 RX ORDER — ONDANSETRON 2 MG/ML
4 INJECTION INTRAMUSCULAR; INTRAVENOUS
Status: DISCONTINUED | OUTPATIENT
Start: 2021-09-30 | End: 2021-09-30 | Stop reason: HOSPADM

## 2021-09-30 RX ORDER — MEPERIDINE HYDROCHLORIDE 25 MG/ML
25 INJECTION INTRAMUSCULAR; INTRAVENOUS; SUBCUTANEOUS EVERY 5 MIN PRN
Status: DISCONTINUED | OUTPATIENT
Start: 2021-09-30 | End: 2021-09-30 | Stop reason: HOSPADM

## 2021-09-30 RX ORDER — PROPOFOL 10 MG/ML
INJECTION, EMULSION INTRAVENOUS PRN
Status: DISCONTINUED | OUTPATIENT
Start: 2021-09-30 | End: 2021-09-30 | Stop reason: SDUPTHER

## 2021-09-30 RX ORDER — EPHEDRINE SULFATE/0.9% NACL/PF 50 MG/5 ML
SYRINGE (ML) INTRAVENOUS PRN
Status: DISCONTINUED | OUTPATIENT
Start: 2021-09-30 | End: 2021-09-30 | Stop reason: SDUPTHER

## 2021-09-30 RX ORDER — LIDOCAINE HYDROCHLORIDE 20 MG/ML
INJECTION, SOLUTION EPIDURAL; INFILTRATION; INTRACAUDAL; PERINEURAL PRN
Status: DISCONTINUED | OUTPATIENT
Start: 2021-09-30 | End: 2021-09-30 | Stop reason: SDUPTHER

## 2021-09-30 RX ORDER — FENTANYL CITRATE 50 UG/ML
INJECTION, SOLUTION INTRAMUSCULAR; INTRAVENOUS PRN
Status: DISCONTINUED | OUTPATIENT
Start: 2021-09-30 | End: 2021-09-30 | Stop reason: SDUPTHER

## 2021-09-30 RX ORDER — MIDAZOLAM HYDROCHLORIDE 1 MG/ML
INJECTION INTRAMUSCULAR; INTRAVENOUS PRN
Status: DISCONTINUED | OUTPATIENT
Start: 2021-09-30 | End: 2021-09-30 | Stop reason: SDUPTHER

## 2021-09-30 RX ADMIN — Medication 0.1 MG: at 09:34

## 2021-09-30 RX ADMIN — Medication 5 MG: at 10:07

## 2021-09-30 RX ADMIN — LIDOCAINE HYDROCHLORIDE 60 MG: 20 INJECTION, SOLUTION EPIDURAL; INFILTRATION; INTRACAUDAL; PERINEURAL at 09:21

## 2021-09-30 RX ADMIN — WATER 1000 MG: 1 INJECTION INTRAMUSCULAR; INTRAVENOUS; SUBCUTANEOUS at 18:22

## 2021-09-30 RX ADMIN — FENTANYL CITRATE 50 MCG: 50 INJECTION, SOLUTION INTRAMUSCULAR; INTRAVENOUS at 09:50

## 2021-09-30 RX ADMIN — INSULIN LISPRO 2 UNITS: 100 INJECTION, SOLUTION INTRAVENOUS; SUBCUTANEOUS at 16:16

## 2021-09-30 RX ADMIN — PROPOFOL 200 MG: 10 INJECTION, EMULSION INTRAVENOUS at 09:21

## 2021-09-30 RX ADMIN — NIFEDIPINE 30 MG: 30 TABLET, EXTENDED RELEASE ORAL at 15:27

## 2021-09-30 RX ADMIN — WATER 1000 MG: 1 INJECTION INTRAMUSCULAR; INTRAVENOUS; SUBCUTANEOUS at 11:58

## 2021-09-30 RX ADMIN — FENTANYL CITRATE 50 MCG: 50 INJECTION, SOLUTION INTRAMUSCULAR; INTRAVENOUS at 09:21

## 2021-09-30 RX ADMIN — HYDRALAZINE HYDROCHLORIDE 25 MG: 25 TABLET, FILM COATED ORAL at 13:45

## 2021-09-30 RX ADMIN — CIPROFLOXACIN 400 MG: 2 INJECTION INTRAVENOUS at 09:19

## 2021-09-30 RX ADMIN — SODIUM CHLORIDE: 9 INJECTION, SOLUTION INTRAVENOUS at 09:17

## 2021-09-30 RX ADMIN — KETOROLAC TROMETHAMINE 15 MG: 30 INJECTION, SOLUTION INTRAMUSCULAR; INTRAVENOUS at 01:13

## 2021-09-30 RX ADMIN — SODIUM CHLORIDE: 9 INJECTION, SOLUTION INTRAVENOUS at 10:10

## 2021-09-30 RX ADMIN — KETOROLAC TROMETHAMINE 15 MG: 30 INJECTION, SOLUTION INTRAMUSCULAR; INTRAVENOUS at 13:44

## 2021-09-30 RX ADMIN — AMLODIPINE BESYLATE 10 MG: 10 TABLET ORAL at 08:25

## 2021-09-30 RX ADMIN — MIDAZOLAM 1 MG: 1 INJECTION INTRAMUSCULAR; INTRAVENOUS at 09:17

## 2021-09-30 RX ADMIN — SODIUM CHLORIDE: 9 INJECTION, SOLUTION INTRAVENOUS at 18:28

## 2021-09-30 RX ADMIN — METOPROLOL SUCCINATE 50 MG: 50 TABLET, FILM COATED, EXTENDED RELEASE ORAL at 08:25

## 2021-09-30 RX ADMIN — HYDRALAZINE HYDROCHLORIDE 25 MG: 25 TABLET, FILM COATED ORAL at 21:39

## 2021-09-30 RX ADMIN — Medication 5 MG: at 09:43

## 2021-09-30 RX ADMIN — INSULIN LISPRO 2 UNITS: 100 INJECTION, SOLUTION INTRAVENOUS; SUBCUTANEOUS at 11:57

## 2021-09-30 RX ADMIN — Medication 5 MG: at 10:10

## 2021-09-30 RX ADMIN — SODIUM CHLORIDE: 9 INJECTION, SOLUTION INTRAVENOUS at 13:17

## 2021-09-30 RX ADMIN — TAMSULOSIN HYDROCHLORIDE 0.8 MG: 0.4 CAPSULE ORAL at 21:38

## 2021-09-30 RX ADMIN — INSULIN GLARGINE 10 UNITS: 100 INJECTION, SOLUTION SUBCUTANEOUS at 21:44

## 2021-09-30 RX ADMIN — GABAPENTIN 600 MG: 300 CAPSULE ORAL at 21:38

## 2021-09-30 RX ADMIN — LEVOTHYROXINE SODIUM 25 MCG: 25 TABLET ORAL at 06:29

## 2021-09-30 RX ADMIN — Medication 0.1 MG: at 09:40

## 2021-09-30 ASSESSMENT — PULMONARY FUNCTION TESTS
PIF_VALUE: 3
PIF_VALUE: 1
PIF_VALUE: 0
PIF_VALUE: 1
PIF_VALUE: 1
PIF_VALUE: 5
PIF_VALUE: 1
PIF_VALUE: 5
PIF_VALUE: 4
PIF_VALUE: 6
PIF_VALUE: 5
PIF_VALUE: 4
PIF_VALUE: 6
PIF_VALUE: 5
PIF_VALUE: 0
PIF_VALUE: 5
PIF_VALUE: 4
PIF_VALUE: 5
PIF_VALUE: 6
PIF_VALUE: 4
PIF_VALUE: 5
PIF_VALUE: 1
PIF_VALUE: 5
PIF_VALUE: 0
PIF_VALUE: 3
PIF_VALUE: 4
PIF_VALUE: 4
PIF_VALUE: 0
PIF_VALUE: 4
PIF_VALUE: 21
PIF_VALUE: 3
PIF_VALUE: 2
PIF_VALUE: 5
PIF_VALUE: 0
PIF_VALUE: 18
PIF_VALUE: 15
PIF_VALUE: 4
PIF_VALUE: 5
PIF_VALUE: 4
PIF_VALUE: 2
PIF_VALUE: 5
PIF_VALUE: 4
PIF_VALUE: 5
PIF_VALUE: 4
PIF_VALUE: 5
PIF_VALUE: 22
PIF_VALUE: 0
PIF_VALUE: 4
PIF_VALUE: 1
PIF_VALUE: 2
PIF_VALUE: 4
PIF_VALUE: 3
PIF_VALUE: 1
PIF_VALUE: 0
PIF_VALUE: 3
PIF_VALUE: 4
PIF_VALUE: 0
PIF_VALUE: 5
PIF_VALUE: 4
PIF_VALUE: 5
PIF_VALUE: 20
PIF_VALUE: 5
PIF_VALUE: 4
PIF_VALUE: 4
PIF_VALUE: 0
PIF_VALUE: 8
PIF_VALUE: 10
PIF_VALUE: 17
PIF_VALUE: 17
PIF_VALUE: 2
PIF_VALUE: 3
PIF_VALUE: 4
PIF_VALUE: 2
PIF_VALUE: 16
PIF_VALUE: 3
PIF_VALUE: 16
PIF_VALUE: 20
PIF_VALUE: 17
PIF_VALUE: 4
PIF_VALUE: 5
PIF_VALUE: 5
PIF_VALUE: 17
PIF_VALUE: 4
PIF_VALUE: 1
PIF_VALUE: 4
PIF_VALUE: 5

## 2021-09-30 ASSESSMENT — PAIN SCALES - GENERAL
PAINLEVEL_OUTOF10: 0
PAINLEVEL_OUTOF10: 0
PAINLEVEL_OUTOF10: 7
PAINLEVEL_OUTOF10: 10
PAINLEVEL_OUTOF10: 0

## 2021-09-30 NOTE — PROGRESS NOTES
Nursing Transfer Note    Data:  Summary of patients progress: post cysto Dr. Griffin Liu  Reason for transfer: inpatient [phone]    Action:  Explained reason for transfer to Patient. Report given to: eris, using RN Handoff Navigator.   Mode of transportation: cart    Response:  RN Recommendations:

## 2021-09-30 NOTE — PROGRESS NOTES
[facility] Quality Flow/Interdisciplinary Rounds Progress Note        Quality Flow Rounds held on September 30, 2021    Disciplines Attending:  Bedside Nurse, ,  and Nursing Unit Leadership    Spencer Judge was admitted on 9/28/2021  5:51 PM    Anticipated Discharge Date:  Expected Discharge Date: 10/01/21    Disposition:    Kannan Score:  Kannan Scale Score: 22    Readmission Risk              Risk of Unplanned Readmission:  11           Discussed patient goal for the day, patient clinical progression, and barriers to discharge.   The following Goal(s) of the Day/Commitment(s) have been identified:  Other  OR today with Dr. Heladio Solorio, RN  September 30, 2021

## 2021-09-30 NOTE — ANESTHESIA POSTPROCEDURE EVALUATION
Department of Anesthesiology  Postprocedure Note    Patient: Carmen Marks  MRN: [id number]  [place]: 1950  Date of evaluation: 9/30/2021  Time:  5:23 PM     Procedure Summary     Date: 09/30/21 Room / Location: [facility] 06 / [address] [place] [address]    Anesthesia Start: 6432 Anesthesia Stop: 6181    Procedure: CYSTOSCOPY RETROGRADE PYELOGRAM URETEROSCOPY LASER LITHOTRIPSY LEFT with stone basket retrieval (Left Ureter) Diagnosis: (/)    Surgeons: Yaakov Bailey MD Responsible Provider: Stacy Kerns MD    Anesthesia Type: general ASA Status: 3          Anesthesia Type: general    Fred Phase I: Fred Score: 10    Fred Phase II:      Last vitals: Reviewed and per EMR flowsheets.        Anesthesia Post Evaluation    Patient location during evaluation: PACU  Patient participation: complete - patient participated  Level of consciousness: awake  Pain score: 3  Airway patency: patent  Nausea & Vomiting: no nausea  Complications: no  Cardiovascular status: blood pressure returned to baseline  Respiratory status: acceptable  Hydration status: euvolemic

## 2021-09-30 NOTE — ANESTHESIA PRE PROCEDURE
Department of Anesthesiology  Preprocedure Note       Name:  Emeka Santiago   Age:  73 y.o.  :  1950                                          MRN:  [id number]         Date:  2021      Surgeon: Kalyani Nunez):  Jolly Mohan MD    Procedure: Procedure(s):  CYSTOSCOPY RETROGRADE PYELOGRAM URETEROSCOPY LASER LITHOTRIPSY LEFT    Medications prior to admission:   Prior to Admission medications    Medication Sig Start Date End Date Taking? Authorizing Provider   gabapentin (NEURONTIN) 300 MG capsule Take 600 mg by mouth nightly. Yes Historical Provider, MD   Cholecalciferol (VITAMIN D3) 5000 units TABS Take by mouth   Yes Historical Provider, MD   Glucosamine HCl (GLUCOSAMINE MAXIMUM STRENGTH) 1500 MG TABS Take by mouth daily Indications: contains msm   Yes Historical Provider, MD   aspirin 81 MG EC tablet Take 1 tablet by mouth daily 19  Yes Dinesh Jorge MD   metoprolol succinate (TOPROL XL) 25 MG extended release tablet Take 0.5 tablets by mouth daily  Patient taking differently: Take 12.5 mg by mouth 2 times daily  19  Yes Dinesh Jorge MD   lisinopril (PRINIVIL;ZESTRIL) 40 MG tablet Take 1 tablet by mouth daily 4/10/19  Yes Dinesh Jorge MD   levothyroxine (SYNTHROID) 25 MCG tablet Take 25 mcg by mouth Daily   Yes Historical Provider, MD   tamsulosin (FLOMAX) 0.4 MG capsule Take 0.8 mg by mouth daily   Yes Historical Provider, MD   GLIPIZIDE PO Take 10 mg by mouth daily    Yes Historical Provider, MD   metFORMIN (GLUCOPHAGE) 1000 MG tablet Take 1,000 mg by mouth 2 times daily (with meals)    Yes Historical Provider, MD   nitroGLYCERIN (NITROSTAT) 0.4 MG SL tablet up to max of 3 total doses.  If no relief after 1 dose, call 911. 19   Dinesh Jorge MD       Current medications:    Current Facility-Administered Medications   Medication Dose Route Frequency Provider Last Rate Last Admin    sodium chloride flush 0.9 % injection 5-40 mL  5-40 mL IntraVENous 2 times per day Mauricio A Marques, DO   10 mL at 09/29/21 2135    sodium chloride flush 0.9 % injection 5-40 mL  5-40 mL IntraVENous PRN Mauricio A Marques, DO        0.9 % sodium chloride infusion  25 mL IntraVENous PRN Mauricio A Marques, DO        metoprolol succinate (TOPROL XL) extended release tablet 50 mg  50 mg Oral Daily Dickson Montemayor MD   50 mg at 09/30/21 0825    hydrALAZINE (APRESOLINE) injection 10 mg  10 mg IntraVENous Q6H PRN Dickson Montemayor MD   10 mg at 09/29/21 2255    ciprofloxacin (CIPRO) IVPB 400 mg  400 mg IntraVENous On Call to OR Mauricio A Marques, DO        ketorolac (TORADOL) injection 15 mg  15 mg IntraVENous Q8H PRN Shayna Voss MD   15 mg at 09/30/21 0113    gabapentin (NEURONTIN) capsule 600 mg  600 mg Oral Nightly Shayna Voss MD   600 mg at 09/29/21 2134    tamsulosin (FLOMAX) capsule 0.8 mg  0.8 mg Oral Nightly Dickson Montemayor MD   0.8 mg at 09/29/21 2203    amLODIPine (NORVASC) tablet 10 mg  10 mg Oral Daily Dina Adhikari MD   10 mg at 09/30/21 0825    aspirin EC tablet 81 mg  81 mg Oral Daily Gary Jimenez MD        [Held by provider] clopidogrel (PLAVIX) tablet 75 mg  75 mg Oral Daily Dina Adhikari MD        ezetimibe (ZETIA) tablet 10 mg  10 mg Oral Daily Dina Adhikari MD        hydrALAZINE (APRESOLINE) tablet 25 mg  25 mg Oral TID [facility] Abrahan Alfonso MD        levothyroxine (SYNTHROID) tablet 25 mcg  25 mcg Oral Daily Dina Adhikari MD   25 mcg at 09/30/21 7274    [Held by provider] lisinopril (PRINIVIL;ZESTRIL) tablet 40 mg  40 mg Oral Daily Dina Adhikari MD        sodium chloride flush 0.9 % injection 5-40 mL  5-40 mL IntraVENous 2 times per day [facility] Abrahan Alfonso MD        sodium chloride flush 0.9 % injection 5-40 mL  5-40 mL IntraVENous PRN Dina Zheng MD        0.9 % sodium chloride infusion  25 mL IntraVENous PRN Dina Zheng MD        enoxaparin (LOVENOX) injection 40 mg  40 mg SubCUTAneous Daily Gary Jimenez MD       [employer] ondansetron (ZOFRAN-ODT) disintegrating tablet 4 mg  4 mg Oral Q8H PRN Dina Faith MD        Or    [facility]ron [facility] injection 4 mg  4 mg IntraVENous Q6H PRN Dina Zheng MD        polyethylene glycol (GLYCOLAX) packet 17 g  17 g Oral Daily PRN Dina Faith MD        acetaminophen (TYLENOL) tablet 650 mg  650 mg Oral Q6H PRN Dina Faith MD        Or    acetaminophen (TYLENOL) suppository 650 mg  650 mg Rectal Q6H PRN Dina Zheng MD        0.9 % sodium chloride infusion   IntraVENous Continuous Dina Faith MD 75 mL/hr at 09/29/21 1504 New Bag at 09/29/21 1504    potassium chloride 10 mEq/100 mL IVPB (Peripheral Line)  10 mEq IntraVENous PRN Dina Faith MD        magnesium sulfate 2000 mg in 50 mL IVPB premix  2,000 mg IntraVENous PRN Dina Faith MD        morphine (PF) injection 2 mg  2 mg IntraVENous Q4H PRN Dina Zheng MD        insulin glargine (LANTUS) injection vial 10 Units  10 Units SubCUTAneous Nightly Dina Faith MD   10 Units at 09/29/21 2135    insulin lispro (HUMALOG) injection vial 0-12 Units  0-12 Units SubCUTAneous TID WC Dina Faith MD   2 Units at 09/29/21 1139    insulin lispro (HUMALOG) injection vial 0-6 Units  0-6 Units SubCUTAneous Nightly Dina Faith MD   1 Units at 09/29/21 2134       Allergies: Allergies   Allergen Reactions    Statins      States he is intolerant        Problem List:    Patient Active Problem List   Diagnosis Code    Chest pain R07.9    Elevated lipase R74.8    Accelerated hypertension I10    Uncontrolled type 2 diabetes mellitus with hyperglycemia ([facility] [address] E11.65    Incomplete right bundle branch block I45.10    Headache R51.9    Obesity (BMI 30-39. 9) E66.9    Class 1 obesity due to excess calories in adult E66.09    Chest pain due to myocardial ischemia I25.9    Unstable angina (HCC) I20.0    Coronary artery disease due to lipid rich plaque I25.10, I25.83    RLQ abdominal pain R10.31    Right upper quadrant abdominal pain R10.11    Nausea and vomiting R11.2    DELORIS (acute kidney injury) ([facility] [address] N17.9       Past Medical History:        Diagnosis Date    Arthritis     Coronary artery disease due to lipid rich plaque 5/29/2019    Diabetes mellitus ([facility] [address]     Hypercholesteremia     Hypertension     Squamous cell carcinoma     Bottom lip had cancer cut off    Thyroid disease        Past Surgical History:        Procedure Laterality Date    CARDIAC SURGERY      may 2019 stents x2    CARPAL TUNNEL RELEASE      bilateral    CHOLECYSTECTOMY, LAPAROSCOPIC N/A 6/3/2021    LAPAROSCOPIC ROBOTIC ASSISTED SUBTOTALCHOLECYSTECTOMY performed by Jodie Kaur MD at [facility]  04/08/2019    Reso;[place] Macho 2.25 x15mm and resolute macho 2.25x8 to 1st diagonal artery by DR Aruna Bernal      for his cancer on his lower lip    FRACTURE SURGERY      Fractured left ankle times two    FRACTURE SURGERY      broke 2 transverse processes in his back    HEMORRHOID SURGERY      SHOULDER SURGERY      right    SKIN BIOPSY      TONSILLECTOMY         Social History:    Social History     Tobacco Use    Smoking status: Former Smoker    Smokeless tobacco: Former User     Quit date: 10/10/1988    Tobacco comment: Quit years ago when in [employer]   Substance Use Topics    Alcohol use:  No                                Counseling given: Not Answered  Comment: Quit years ago when in [address] [place] [address] Signs (Current):   Vitals:    09/29/21 2247 09/30/21 0021 09/30/21 0036 09/30/21 0815   BP: (!) 166/78 (!) 160/97  (!) 195/86   Pulse:  64  61   Resp:  16  14   Temp:  97.7 °F (36.5 °C)  97.7 °F (36.5 °C)   TempSrc:  Oral  Oral   SpO2:    96%   Weight:   204 lb (92.5 kg)    Height:                                                  BP Readings from Last 3 Encounters:   09/30/21 (!) 195/86   06/06/21 (!) 153/72   06/03/21 (!) 229/105       NPO Status: Time of last liquid consumption: 0800                        Time of last solid consumption: 0800                        Date of last liquid consumption: 09/29/21                        Date of last solid food consumption: 09/29/21    BMI:   Wt Readings from Last 3 Encounters:   09/30/21 204 lb (92.5 kg)   06/03/21 218 lb 7.6 oz (99.1 kg)   10/31/19 209 lb (94.8 kg)     Body mass index is 31.02 kg/m². CBC:   Lab Results   Component Value Date    WBC 8.5 09/29/2021    RBC 4.56 09/29/2021    HGB 13.0 09/29/2021    HCT 38.5 09/29/2021    MCV 84.4 09/29/2021    RDW 13.2 09/29/2021     09/29/2021       CMP:   Lab Results   Component Value Date     09/29/2021    K 4.0 09/29/2021    K 3.5 09/29/2021     09/29/2021    CO2 22 09/29/2021    BUN 19 09/29/2021    CREATININE 1.9 09/29/2021    GFRAA 43 09/29/2021    LABGLOM 35 09/29/2021    GLUCOSE 193 09/29/2021    PROT 6.3 09/29/2021    CALCIUM 8.6 09/29/2021    BILITOT 0.4 09/29/2021    ALKPHOS 78 09/29/2021    AST 17 09/29/2021    ALT 15 09/29/2021       POC Tests: No results for input(s): POCGLU, POCNA, POCK, POCCL, POCBUN, POCHEMO, POCHCT in the last 72 hours.     Coags:   Lab Results   Component Value Date    PROTIME 19.7 06/03/2021    INR 1.8 06/03/2021    APTT 29.8 05/14/2019       HCG (If Applicable): No results found for: PREGTESTUR, PREGSERUM, HCG, HCGQUANT     ABGs: No results found for: PHART, PO2ART, EPW1UXJ, CII3TVR, BEART, J4ZADEPF     Type & Screen (If Applicable):  No results found for: LABABO, LABRH    Drug/Infectious Status (If Applicable):  No results found for: HIV, HEPCAB    COVID-19 Screening (If Applicable): No results found for: COVID19        Anesthesia Evaluation  Patient summary reviewed  Airway: Mallampati: III  TM distance: >3 FB   Neck ROM: full   Dental:    (+) upper dentures      Pulmonary: breath sounds clear to auscultation  (+) sleep apnea: on noncompliant, Cardiovascular:    (+) hypertension:, angina:, CAD:, CABG/stent:,         Rhythm: regular             Beta Blocker:  Dose within 24 Hrs         Neuro/Psych:   (+) headaches:,             GI/Hepatic/Renal:   (+) renal disease: CRI, morbid obesity          Endo/Other:    (+) DiabetesType II DM, poorly controlled, , hypothyroidism::., .                 Abdominal:   (+) obese,           Vascular: Other Findings:             Anesthesia Plan      general     ASA 3       Induction: intravenous. Anesthetic plan and risks discussed with patient. Plan discussed with CRNA.                   Nereyda Saldivar MD   9/30/2021

## 2021-09-30 NOTE — BRIEF OP NOTE
Brief Postoperative Note      Patient: Allison Wise  YOB: 1950  MRN: [id number]    Date of Procedure: 9/30/2021    Pre-Op Diagnosis: Left distal ureteral calculus with azotemia  Post-Op Diagnosis: 2 calculi 1 approximately 5 mm at the left ureterovesical junction and a 4 mm stone at the junction of the middle and distal third of the ureter left with hydronephrosis and pain       Procedure(s):  CYSTOSCOPY RETROGRADE PYELOGRAM URETEROSCOPY LASER LITHOTRIPSY LEFT with stone basket retrieval and stent insertion left    Surgeon(s):  Jalaine Goodpasture Memo, MD    Assistant:  None    Anesthesia: General    Estimated Blood Loss (mL): Minimal less than 10 cc    Complications: None    Specimens:   ID Type Source Tests Collected by Time Destination   1 : URINE LEFT KIDNEY  Urine Urine, Cystoscopic CULTURE, URINE, URINALYSIS Jalaine Goodpasture Memo, MD 9/30/2021 6854    A : LEFT URETERAL STONE Stone ([employer]culus) [id number] A MD Marques 9/30/2021 5691        Implants:  Implant Name Type Inv.  Item Serial No.  Lot No. LRB No. Used Action   STENT URET 6FR L28CM HYDRPHLC FIRM DBL PGTL TETHERED FLX  STENT URET 6FR L28CM HYDRPHLC FIRM DBL PGTL TETHERED FLX  [employer]- [id number] Left 1 Implanted         Drains:   Ureteral Catheter Left ureter (Active)       [REMOVED] Closed/Suction Drain Right RLQ Bulb 15 [place] (Removed)       Findings: As above    Electronically signed by Anai Godfrey MD on 9/30/2021 at 10:36 AM

## 2021-09-30 NOTE — PLAN OF CARE
Problem: Pain:  Goal: Pain level will decrease  Description: Pain level will decrease  9/30/2021 0838 by Lynnette Rivero RN  Outcome: Met This Shift  9/29/2021 2234 by Braden Jameson RN  Outcome: Ongoing  Goal: Control of acute pain  Description: Control of acute pain  9/30/2021 0838 by Lynnette Rivero RN  Outcome: Met This Shift  9/29/2021 2234 by Braden Jameson RN  Outcome: Ongoing     Problem: Pain:  Goal: Control of chronic pain  Description: Control of chronic pain  9/29/2021 2234 by Braden Jameson RN  Outcome: Ongoing     Problem: Urinary Retention:  Goal: Urinary elimination within specified parameters  Description: Urinary elimination within specified parameters  9/29/2021 2234 by Braden Jameson RN  Outcome: Ongoing  Goal: Able to perform urinary catheter care  Description: Able to perform urinary catheter care  9/30/2021 0838 by Lynnette Rievro RN  Outcome: Ongoing  9/29/2021 2234 by Braden Jameson RN  Outcome: Ongoing  Goal: Able to perform urinary self-catheterization  Description: Able to perform urinary self-catheterization  9/30/2021 0838 by Lynnette Rivero RN  Outcome: Ongoing  9/29/2021 2234 by Braden Jamseon RN  Outcome: Ongoing  Goal: Ability to reestablish a normal urinary elimination pattern will improve - after catheter removal  Description: Ability to reestablish a normal urinary elimination pattern will improve - after catheter removal  9/29/2021 2234 by Braden Jameson RN  Outcome: Ongoing  Goal: Ability to recognize the need to void and respond appropriately will improve  Description: Ability to recognize the need to void and respond appropriately will improve  9/29/2021 2234 by Braden Jameson RN  Outcome: Ongoing  Goal: Absence of postvoid residual urine  Description: Absence of postvoid residual urine  9/29/2021 2234 by Braden Jameson RN  Outcome: Ongoing

## 2021-10-01 VITALS
HEART RATE: 62 BPM | SYSTOLIC BLOOD PRESSURE: 130 MMHG | DIASTOLIC BLOOD PRESSURE: 61 MMHG | RESPIRATION RATE: 16 BRPM | BODY MASS INDEX: 30.62 KG/M2 | WEIGHT: 202 LBS | TEMPERATURE: 97.4 F | OXYGEN SATURATION: 94 % | HEIGHT: 68 IN

## 2021-10-01 LAB
METER GLUCOSE: 119 MG/DL (ref 74–99)
METER GLUCOSE: 151 MG/DL (ref 74–99)
METER GLUCOSE: 201 MG/DL (ref 74–99)

## 2021-10-01 PROCEDURE — 6360000002 HC RX W HCPCS: Performed by: UROLOGY

## 2021-10-01 PROCEDURE — 0T9B70Z DRAINAGE OF BLADDER WITH DRAINAGE DEVICE, VIA NATURAL OR ARTIFICIAL OPENING: ICD-10-PCS | Performed by: INTERNAL MEDICINE

## 2021-10-01 PROCEDURE — 87088 URINE BACTERIA CULTURE: CPT

## 2021-10-01 PROCEDURE — 2580000003 HC RX 258: Performed by: UROLOGY

## 2021-10-01 PROCEDURE — 6370000000 HC RX 637 (ALT 250 FOR IP): Performed by: UROLOGY

## 2021-10-01 PROCEDURE — 99239 HOSP IP/OBS DSCHRG MGMT >30: CPT | Performed by: FAMILY MEDICINE

## 2021-10-01 PROCEDURE — 6370000000 HC RX 637 (ALT 250 FOR IP): Performed by: FAMILY MEDICINE

## 2021-10-01 PROCEDURE — 82962 GLUCOSE BLOOD TEST: CPT

## 2021-10-01 RX ORDER — CLOPIDOGREL BISULFATE 75 MG/1
75 TABLET ORAL DAILY
Qty: 30 TABLET | Refills: 3 | Status: SHIPPED | OUTPATIENT
Start: 2021-10-01 | End: 2021-10-08

## 2021-10-01 RX ORDER — NIFEDIPINE 30 MG/1
30 TABLET, FILM COATED, EXTENDED RELEASE ORAL DAILY
Qty: 30 TABLET | Refills: 3 | Status: SHIPPED | OUTPATIENT
Start: 2021-10-02 | End: 2021-10-01

## 2021-10-01 RX ORDER — METOPROLOL SUCCINATE 50 MG/1
50 TABLET, EXTENDED RELEASE ORAL DAILY
Qty: 30 TABLET | Refills: 3 | Status: SHIPPED | OUTPATIENT
Start: 2021-10-02

## 2021-10-01 RX ORDER — CEFDINIR 300 MG/1
300 CAPSULE ORAL 2 TIMES DAILY
Qty: 14 CAPSULE | Refills: 0 | Status: SHIPPED | OUTPATIENT
Start: 2021-10-01 | End: 2021-10-08

## 2021-10-01 RX ORDER — CEFDINIR 300 MG/1
300 CAPSULE ORAL 2 TIMES DAILY
Qty: 14 CAPSULE | Refills: 0 | Status: SHIPPED | OUTPATIENT
Start: 2021-10-01 | End: 2021-10-01 | Stop reason: SDUPTHER

## 2021-10-01 RX ORDER — METOPROLOL SUCCINATE 50 MG/1
50 TABLET, EXTENDED RELEASE ORAL DAILY
Qty: 30 TABLET | Refills: 3 | Status: SHIPPED | OUTPATIENT
Start: 2021-10-02 | End: 2021-10-01

## 2021-10-01 RX ORDER — NIFEDIPINE 30 MG/1
30 TABLET, FILM COATED, EXTENDED RELEASE ORAL DAILY
Qty: 30 TABLET | Refills: 3 | Status: SHIPPED | OUTPATIENT
Start: 2021-10-02 | End: 2021-10-08

## 2021-10-01 RX ORDER — CLOPIDOGREL BISULFATE 75 MG/1
75 TABLET ORAL DAILY
Qty: 30 TABLET | Refills: 3 | Status: SHIPPED | OUTPATIENT
Start: 2021-10-01 | End: 2021-10-01

## 2021-10-01 RX ADMIN — NIFEDIPINE 30 MG: 30 TABLET, EXTENDED RELEASE ORAL at 09:24

## 2021-10-01 RX ADMIN — WATER 1000 MG: 1 INJECTION INTRAMUSCULAR; INTRAVENOUS; SUBCUTANEOUS at 11:19

## 2021-10-01 RX ADMIN — INSULIN LISPRO 2 UNITS: 100 INJECTION, SOLUTION INTRAVENOUS; SUBCUTANEOUS at 16:26

## 2021-10-01 RX ADMIN — ASPIRIN 81 MG: 81 TABLET, COATED ORAL at 09:23

## 2021-10-01 RX ADMIN — INSULIN LISPRO 4 UNITS: 100 INJECTION, SOLUTION INTRAVENOUS; SUBCUTANEOUS at 11:19

## 2021-10-01 RX ADMIN — ENOXAPARIN SODIUM 40 MG: 40 INJECTION SUBCUTANEOUS at 09:25

## 2021-10-01 RX ADMIN — METOPROLOL SUCCINATE 50 MG: 50 TABLET, FILM COATED, EXTENDED RELEASE ORAL at 09:25

## 2021-10-01 RX ADMIN — HYDRALAZINE HYDROCHLORIDE 25 MG: 25 TABLET, FILM COATED ORAL at 09:24

## 2021-10-01 RX ADMIN — WATER 1000 MG: 1 INJECTION INTRAMUSCULAR; INTRAVENOUS; SUBCUTANEOUS at 03:17

## 2021-10-01 RX ADMIN — SODIUM CHLORIDE: 9 INJECTION, SOLUTION INTRAVENOUS at 03:17

## 2021-10-01 RX ADMIN — HYDRALAZINE HYDROCHLORIDE 10 MG: 20 INJECTION INTRAMUSCULAR; INTRAVENOUS at 09:24

## 2021-10-01 RX ADMIN — LEVOTHYROXINE SODIUM 25 MCG: 25 TABLET ORAL at 05:42

## 2021-10-01 RX ADMIN — HYDRALAZINE HYDROCHLORIDE 25 MG: 25 TABLET, FILM COATED ORAL at 13:29

## 2021-10-01 ASSESSMENT — PAIN SCALES - GENERAL
PAINLEVEL_OUTOF10: 0
PAINLEVEL_OUTOF10: 0

## 2021-10-01 NOTE — DISCHARGE SUMMARY
[facility] Physician Discharge Summary       No follow-up provider specified. Activity level: As tolerated     Dispo: home      Condition on discharge: Stable     Patient ID:  Laurie Nelson  [id number]  14 y.o.  1950    Admit date: 9/28/2021    Discharge date and time:  10/1/2021  3:33 PM    Admission Diagnoses: Active Problems:    DELORIS (acute kidney injury) ([facility] [address]  Resolved Problems:    * No resolved hospital problems. *      Discharge Diagnoses: Active Problems:    DELORIS (acute kidney injury) ([facility] [address]  Resolved Problems:    * No resolved hospital problems. *      Consults:  IP CONSULT TO UROLOGY  IP CONSULT TO IV TEAM  IP CONSULT TO IV TEAM    Procedures:  Cystopanendoscopy, retrograde pyelogram, left  ureteroscopy, stone extraction, laser lithotripsy, stent insertion. No  string. Hospital Course:   Patient Laurie Nelson is a 73 y.o. presented with DELORIS (acute kidney injury) ([facility] [address] [N17.9]     1. Left obstructive uropathy  -urology on board  -cystoscopy, retrograde pyelogram, ureteroscopy, laser lithotripsy, left stent insertion today  -hematuria present  -patient tolerated the procedure well; 2 stones removed. -outpatient follow up with urology recommended  -will continue abx x 7 days at home     2. DELORIS-secondary to #1-mild worsening of creatinine now at 1.9  -continue to monitor creatinine  -continue IVF  -lisinopril on hold     3. CAD s/p PCI in 2019     4. Hypertensive urgency-persists  -BP improved somewhat with nifedipine. Will continue. Patient needs outpatient follow up with PCP     5. BPH-continue flomax     6.   DM2-hold oral hypoglycemics  -continue to monitor BG at home  -continue Lantus        Discharge Exam:    General Appearance: alert and oriented to person, place and time and in no acute distress  Skin: warm and dry  Head: normocephalic and atraumatic  Eyes: pupils equal, round, and reactive to light, extraocular eye movements intact, conjunctivae normal  Neck: neck supple and non tender without mass   Pulmonary/Chest: clear to auscultation bilaterally- no wheezes, rales or rhonchi, normal air movement, no respiratory distress  Cardiovascular: normal rate, normal S1 and S2 and no carotid bruits  Abdomen: soft, non-tender, non-distended, normal bowel sounds, no masses or organomegaly  Extremities: no cyanosis, no clubbing and no edema  Neurologic: no cranial nerve deficit and speech normal    I/O last 3 completed shifts: In: 2209 [P.O.:240; I.V.:1969]  Out: 750 [Urine:750]  No intake/output data recorded. LABS:  Recent Labs     09/28/21 1830 09/29/21 0120 09/29/21 1915    139 137   K 3.8 3.5 4.0    106 105   CO2 23 23 22   BUN 19 19 19   CREATININE 1.7* 1.6* 1.9*   GLUCOSE 107* 163* 193*   CALCIUM 8.9 8.5* 8.6       Recent Labs     09/28/21 1830 09/29/21 0120 09/29/21 1915   WBC 8.1 7.1 8.5   RBC 4.88 4.66 4.56   HGB 13.9 13.1 13.0   HCT 41.5 38.9 38.5   MCV 85.0 83.5 84.4   MCH 28.5 28.1 28.5   MCHC 33.5 33.7 33.8   RDW 13.4 13.3 13.2    244 248   MPV 9.9 9.3 9.6       No results for input(s): POCGLU in the last 72 hours. Imaging:  CT ABDOMEN PELVIS WO CONTRAST Additional Contrast? None    Result Date: 9/28/2021  EXAMINATION: CT OF THE ABDOMEN AND PELVIS WITHOUT CONTRAST 9/28/2021 6:43 pm TECHNIQUE: CT of the abdomen and pelvis was performed without the administration of intravenous contrast. Multiplanar reformatted images are provided for review. Dose modulation, iterative reconstruction, and/or weight based adjustment of the mA/kV was utilized to reduce the radiation dose to as low as reasonably achievable. COMPARISON: 06/01/2021 HISTORY: ORDERING SYSTEM PROVIDED HISTORY: L FLank pain TECHNOLOGIST PROVIDED HISTORY: Reason for exam:->L FLank pain Additional Contrast?->None Decision Support Exception - unselect if not a suspected or confirmed emergency medical condition->Emergency Medical Condition (MA) FINDINGS: Lower Chest: Lung bases clear. Atherosclerosis of the coronary arteries noted. Organs: Minimal diffuse hepatic steatosis. Punctate calcified splenic granulomas. Unremarkable pancreas and adrenals on this unenhanced study. Interval cholecystectomy. Tiny calcifications in the expected location of distal CBD. MRCP may be obtained to evaluate for possible choledocholithiasis if clinically indicated. Bilateral renal cortical hypodensities measuring up to 4 cm. These are likely simple cysts. Bilateral renal stones measuring up to 0.4 cm in the lower pole of the right kidney. A 0.4 cm stone in the distal left ureter at the level of the pelvic brim causing mild upstream hydroureteronephrosis. GI/Bowel: Distal colonic diverticulosis. No acute diverticulitis. Bowel loops nonobstructed. Normal appendix. Pelvis: Markedly enlarged prostate gland causing mass effect on the neck of the urinary bladder. Prostatic calcifications noted. No radiopaque stone in the urinary bladder. Peritoneum/Retroperitoneum: No free air or free fluid. No adenopathy. Vascular calcification with no abdominal aortic aneurysm. Bones/Soft Tissues: Multilevel thoracolumbar spondylosis. Mild-to-moderate osteoarthritis of the bilateral hip joints. Interval cholecystectomy. Questionable distal choledocholithiasis. MRCP may be obtained confirm if clinically indicated. A 0.4 cm stone in the distal left ureter at the level of the pelvic brim causing mild upstream hydroureteronephrosis. Multiple small bilateral renal stones. Markedly enlarged prostate. Correlation with rectal exam and serum PSA levels recommended. Distal colonic diverticulosis. No acute diverticulitis.        Patient Instructions:      Medication List      START taking these medications    cefdinir 300 MG capsule  Commonly known as: OMNICEF  Take 1 capsule by mouth 2 times daily for 7 days     NIFEdipine 30 MG extended release tablet  Commonly known as: ADALAT CC  Take 1 tablet by mouth daily  Start taking on: October 2, 2021        CHANGE how you take these medications    metoprolol succinate 50 MG extended release tablet  Commonly known as: TOPROL XL  Take 1 tablet by mouth daily  Start taking on: October 2, 2021  What changed:   · medication strength  · how much to take        CONTINUE taking these medications    aspirin 81 MG EC tablet  Take 1 tablet by mouth daily     clopidogrel 75 MG tablet  Commonly known as: PLAVIX  Take 1 tablet by mouth daily     gabapentin 300 MG capsule  Commonly known as: NEURONTIN     GLIPIZIDE PO     Glucosamine Maximum Strength 1500 MG Tabs  Generic drug: Glucosamine HCl     levothyroxine 25 MCG tablet  Commonly known as: SYNTHROID     metFORMIN 1000 MG tablet  Commonly known as: GLUCOPHAGE     nitroGLYCERIN 0.4 MG SL tablet  Commonly known as: NITROSTAT  up to max of 3 total doses.  If no relief after 1 dose, call 911.     tamsulosin 0.4 MG capsule  Commonly known as: FLOMAX     Vitamin D3 125 MCG (5000 UT) Tabs        STOP taking these medications    lisinopril 40 MG tablet  Commonly known as: PRINIVIL;ZESTRIL           Where to Get Your Medications      These medications were sent to [facility] [id number] [facility] [place] [phone] Nicol Simves [phone]  [facility] Ermias Armstrong, [address] [place] [address]    Phone: [phone]   · cefdinir 300 MG capsule  · clopidogrel 75 MG tablet  · metoprolol succinate 50 MG extended release tablet  · NIFEdipine 30 MG extended release tablet           Note that more than 30 minutes was spent in preparing discharge papers, discussing discharge with patient, medication review, etc.    Signed:  Electronically signed by Zoe Kanner, MD on 10/1/2021 at 3:33 PM

## 2021-10-01 NOTE — PROGRESS NOTES
10/1/2021 11:41 AM  Service: Urology  Group: [facility] (Marques/Suman/R[facility])    Diaz Meyer  [id number]    Subjective:    Complains of lower abdominal pain and discomfort  Was incontinent of urine throughout the night  Voiding only small amounts  No fever or chills    Review of Systems  Constitutional: No fever or chills   Respiratory: negative for cough and hemoptysis  Cardiovascular: negative for chest pain and dyspnea  Gastrointestinal: Positive for lower abdominal pain  : See above  Derm: negative for rash and skin lesion(s)  Neurological: negative for seizures and tremors  Musculoskeletal: Negative    Psychiatric: Negative   All other reviews are negative      Scheduled Meds:   ceFAZolin  1,000 mg IntraVENous Q8H    NIFEdipine  30 mg Oral Daily    metoprolol succinate  50 mg Oral Daily    gabapentin  600 mg Oral Nightly    tamsulosin  0.8 mg Oral Nightly    aspirin  81 mg Oral Daily    [Held by provider] clopidogrel  75 mg Oral Daily    ezetimibe  10 mg Oral Daily    hydrALAZINE  25 mg Oral TID    levothyroxine  25 mcg Oral Daily    [Held by provider] lisinopril  40 mg Oral Daily    sodium chloride flush  5-40 mL IntraVENous 2 times per day    enoxaparin  40 mg SubCUTAneous Daily    insulin glargine  10 Units SubCUTAneous Nightly    insulin lispro  0-12 Units SubCUTAneous TID WC    insulin lispro  0-6 Units SubCUTAneous Nightly       Objective:  Vitals:    10/01/21 0800   BP: (!) 173/89   Pulse: 65   Resp: 16   Temp: 97.6 °F (36.4 °C)   SpO2: 94%         Allergies: Statins    General Appearance: alert and oriented to person, place and time and in no acute distress  Skin: no rash or erythema  Head: normocephalic and atraumatic  Pulmonary/Chest: normal air movement, no respiratory distress  Abdomen: Suprapubic bladder distention  Genitourinary: Circumcised male, no stent visualized  Extremities: no cyanosis, clubbing or edema         Labs:     Recent Labs     09/29/21  1915      K 4.0    CO2 22   BUN 19   CREATININE 1.9*   GLUCOSE 193*   CALCIUM 8.6       Lab Results   Component Value Date    HGB 13.0 09/29/2021    HCT 38.5 09/29/2021       No results found for: PSA      Assessment/Plan:  POD#1 Cystopanendoscopy, retrograde pyelogram, left ureteroscopy, stone extraction, laser lithotripsy, stent insertion. Bladder distended, bladder scanned for >800ml    His genitalia are prepped and draped in sterile fashion. Following this a 12 Kinyarwanda Perea catheter was inserted into his bladder. He had immediate return of approximately 1200 cc of light cherry urine. He stated immediate relief.      Urine culture no growth to date  Antibiotics per primary   Cont to watch the creatinine  Cont the left ureteral stent  He will need to follow-up in our office in approximately 1 week for stent removal and Perea catheter removal  Home with Perea  He Is already taking 0.8mg of Flomax daily, continue  [id number] [address] for discharge from  standpoint when ok with others     Arely Arevalo, APRN - CNP   [facility]

## 2021-10-01 NOTE — OP NOTE
[address] [address]                                OPERATIVE REPORT    PATIENT NAME: Bg Alberto                      :        1950  MED REC NO:   [id number]                            ROOM:       [id number]  ACCOUNT NO:   [de-identified]                           ADMIT DATE: 2021  PROVIDER:     Jalaine Goodpasture Memo, MD    DATE OF PROCEDURE:  2021    PREOPERATIVE DIAGNOSES:  Left distal ureteral calculus with  hypertension, azotemia, hydronephrosis, and pain. POSTOPERATIVE DIAGNOSES:  Left distal ureteral calculus with  hypertension, azotemia, hydronephrosis, and pain. He had two stones,  one was at the ureterovesical junction and the other was at the junction  of the middle and distal third of the ureter. OPERATIONS PERFORMED:  Cystopanendoscopy, retrograde pyelogram, left  ureteroscopy, stone extraction, laser lithotripsy, stent insertion. No  string. SURGEON:  Anai Godfrey MD.    ANESTHESIA:  LMA. CONDITION:  Stable. ESTIMATED BLOOD LOSS:  Less than 10 mL. CONDITION:  Stable. DISPOSITION:  PACU, then back to his room. We will keep him overnight  to watch for his resolution of azotemia. DESCRIPTION OF PROCEDURE:  The time-out was read by me, the Anesthesia,  and the operating room staff, reviewed history and physical, allergy,  and medication, and all were in agreement. A 2 gm of Ancef was given  upon induction. The patient was placed in the lithotomy position,  prepped and draped in the usual fashion. A #21 panendoscope with a  30-degree angle lens with direct vision obturator and video assistance  examined the urethra. No strictures, false passages, abrasions,  ulcerations, cystic or solid lesions. The verumontanum was intact. There was no median lobe hypertrophy. The bladder was entered. No  urine sent for culture.   Careful examination of the bladder demonstrates  a stone large,  probably 60 gm size, normal is 15 to 20. There were no nodules. Blood  loss in this case was less than 10 mL. The patient tolerated the  procedure well and was sent to Recovery in satisfactory condition.         Cirilo Walker MD    D: 09/30/2021 15:13:44       T: 09/30/2021 23:15:08     [email or web address]  Job#: [id number]     Doc#: [id number]    CC:  Gurmeet Herrera MD

## 2021-10-01 NOTE — PATIENT CARE CONFERENCE
[facility] Quality Flow/Interdisciplinary Rounds Progress Note        Quality Flow Rounds held on October 1, 2021    Disciplines Attending:  Bedside Nurse, ,  and Nursing Unit Leadership    Apple Varela was admitted on 9/28/2021  5:51 PM    Anticipated Discharge Date:  Expected Discharge Date: 10/01/21    Disposition:    Kannan Score:  Kannan Scale Score: 22    Readmission Risk              Risk of Unplanned Readmission:  11           Discussed patient goal for the day, patient clinical progression, and barriers to discharge.   The following Goal(s) of the Day/Commitment(s) have been identified:  Labs - Report Results      Aaliyah Leon RN  October 1, 2021

## 2021-10-02 LAB — URINE CULTURE, ROUTINE: NORMAL

## 2021-10-04 LAB — URINE CULTURE, ROUTINE: NORMAL

## 2021-10-06 LAB
CALCULI COMPOSITION: NORMAL
MASS: 147 MG
STONE DESCRIPTION: NORMAL
STONE NUMBER: 2
STONE SIZE: NORMAL MM

## 2021-10-08 ENCOUNTER — HOSPITAL ENCOUNTER (EMERGENCY)
Age: 71
Discharge: HOME | End: 2021-10-09
Attending: EMERGENCY MEDICINE
Payer: MEDICARE

## 2021-10-08 DIAGNOSIS — R33.8 ACUTE URINARY RETENTION: Primary | ICD-10-CM

## 2021-10-08 PROCEDURE — 99283 EMERGENCY DEPT VISIT LOW MDM: CPT

## 2021-10-08 PROCEDURE — 51702 INSERT TEMP BLADDER CATH: CPT

## 2021-10-09 VITALS
HEIGHT: 69 IN | DIASTOLIC BLOOD PRESSURE: 74 MMHG | BODY MASS INDEX: 29.44 KG/M2 | TEMPERATURE: 98.1 F | SYSTOLIC BLOOD PRESSURE: 138 MMHG | HEART RATE: 68 BPM | RESPIRATION RATE: 16 BRPM | WEIGHT: 198.8 LBS | OXYGEN SATURATION: 97 %

## 2021-10-09 NOTE — ED PROVIDER NOTES
HPI:  10/9/21, Time: 12:30 AM EDT        Spencer Judge is a 73 y.o. male presenting to the ED for urinary retention, beginning 8 to 10 hours ago. The complaint has been persistent, moderate in severity, and worsened by nothing. Patient had history of ureteral stent put in last Thursday and had a Perea catheter at that time. He saw his urologist earlier today and the Perea catheter was removed and the patient states he initially did fine but now has had a sensation of inability to urinate and has a low midline abdominal pressure. No nausea/vomiting, no flank pain no hematuria no fever/chills, no other complaints are reported    Review of Systems:   A complete review of systems was performed and pertinent positives and negatives are stated within HPI, all other systems reviewed and are negative.    --------------------------------------------- PAST HISTORY ---------------------------------------------  Past Medical History:  has a past medical history of Arthritis, Coronary artery disease due to lipid rich plaque, Diabetes mellitus ([address] Hypercholesteremia, Hypertension, Squamous cell carcinoma, and Thyroid disease. Past Surgical History:  has a past surgical history that includes shoulder surgery; Carpal tunnel release; Tonsillectomy; Hemorrhoid surgery; Cosmetic surgery; fracture surgery; fracture surgery; skin biopsy; Coronary angioplasty with stent (04/08/2019); Cardiac surgery; Cholecystectomy, laparoscopic (N/A, 6/3/2021); and Lithotripsy (Left, 9/30/2021). Social History:  reports that he has quit smoking. He quit smokeless tobacco use about 33 years ago. He reports that he does not drink alcohol and does not use drugs. Family History: family history includes Alzheimer's Disease in his mother; Cancer in his sister; Stroke in his father. The patients home medications have been reviewed.     Allergies: Statins    -------------------------------------------------- RESULTS -------------------------------------------------  All laboratory and radiology results have been personally reviewed by myself   LABS:  No results found for this visit on 10/08/21. RADIOLOGY:  Interpreted by Radiologist.  No orders to display       ------------------------- NURSING NOTES AND VITALS REVIEWED ---------------------------   The nursing notes within the ED encounter and vital signs as below have been reviewed. /74   Pulse 68   Temp 98.1 °F (36.7 °C)   Resp 16   Ht 5' 9\" (1.753 m)   Wt 198 lb 12.8 oz (90.2 kg)   SpO2 97%   BMI 29.36 kg/m² Oxygen Saturation Interpretation: Normal      ---------------------------------------------------PHYSICAL EXAM--------------------------------------      Constitutional/General: Alert and oriented x3, well appearing, non toxic in NAD  Head: Normocephalic and atraumatic  Eyes: PERRL, EOMI  Mouth: Oropharynx clear, handling secretions, no trismus  Neck: Supple, full ROM, no JVD. Trachea midline  Pulmonary: Lungs clear to auscultation bilaterally, no wheezes, rales, or rhonchi. Not in respiratory distress  Cardiovascular:  Regular rate and rhythm, no murmurs, gallops, or rubs. 2+ distal pulses  Abdomen: Soft, positive mild suprapubic discomfort no right lower quadrant tenderness no rebound no guarding or rigidity normal active bowel sounds no CVA tenderness  Extremities: Moves all extremities x 4. Warm and well perfused  Skin: warm and dry without rash  Neurologic: GCS 15, cranial nerves grossly intact no focal deficits. Psych: Normal Affect    ------------------------------ ED COURSE/MEDICAL DECISION MAKING----------------------  Medications - No data to display      ED COURSE:     Medical Decision Making: Perea catheter was placed and the patient had approximately 800 to 900 mL urine. Patient's Perea was converted to leg bag and he is referred back to his urologist tomorrow for reevaluation    Counseling:   The emergency provider has spoken with the patient and discussed todays results, in addition to providing specific details for the plan of care and counseling regarding the diagnosis and prognosis. Questions are answered at this time and they are agreeable with the plan.    --------------------------------- IMPRESSION AND DISPOSITION ---------------------------------    IMPRESSION  1. Acute urinary retention        DISPOSITION  Disposition: Discharge to home  Patient condition is stable      NOTE: This report was transcribed using voice recognition software.  Every effort was made to ensure accuracy; however, inadvertent computerized transcription errors may be present        Alex Noel MD  10/09/21 [id number]

## 2021-11-26 ENCOUNTER — PREP FOR PROCEDURE (OUTPATIENT)
Dept: UROLOGY | Age: 71
End: 2021-11-26

## 2021-11-26 DIAGNOSIS — Z90.79 S/P TURP: Primary | ICD-10-CM

## 2021-11-26 RX ORDER — SODIUM CHLORIDE 9 MG/ML
25 INJECTION, SOLUTION INTRAVENOUS PRN
Status: CANCELLED | OUTPATIENT
Start: 2021-11-26

## 2021-11-26 RX ORDER — SODIUM CHLORIDE 9 MG/ML
INJECTION, SOLUTION INTRAVENOUS CONTINUOUS
Status: CANCELLED | OUTPATIENT
Start: 2021-11-26

## 2021-11-26 RX ORDER — SODIUM CHLORIDE 0.9 % (FLUSH) 0.9 %
5-40 SYRINGE (ML) INJECTION PRN
Status: CANCELLED | OUTPATIENT
Start: 2021-11-26

## 2021-11-26 RX ORDER — SODIUM CHLORIDE 0.9 % (FLUSH) 0.9 %
5-40 SYRINGE (ML) INJECTION EVERY 12 HOURS SCHEDULED
Status: CANCELLED | OUTPATIENT
Start: 2021-11-26

## 2021-11-29 RX ORDER — NIFEDIPINE 30 MG/1
30 TABLET, FILM COATED, EXTENDED RELEASE ORAL DAILY
COMMUNITY

## 2021-11-29 NOTE — PROGRESS NOTES
[facility] PRE-ADMISSION TESTING INSTRUCTIONS    The Preadmission Testing patient is instructed accordingly using the following criteria (check applicable):    ARRIVAL INSTRUCTIONS:  [x] Parking the day of Surgery is located in the Main Entrance lot. Upon entering the door, make an immediate right to the surgery reception desk    [x] Bring photo ID and insurance card    [] Bring in a copy of Living will or Durable Power of  papers. [x] Please be sure to arrange for responsible adult to provide transportation to and from the hospital    [x] Please arrange for responsible adult to be with you for the 24 hour period post procedure due to having anesthesia      GENERAL INSTRUCTIONS:    [x] Nothing by mouth after midnight, including gum, candy, mints or water    [x] You may brush your teeth, but do not swallow any water    [x] Take medications as instructed with 1-2 oz of water    [x] Stop herbal supplements and vitamins 5 days prior to procedure    [x] Follow preop dosing of blood thinners per physician instructions    [] Take 1/2 dose of evening insulin, but no insulin after midnight    [x] No oral diabetic medications after midnight    [x] If diabetic and have low blood sugar or feel symptomatic, take 1-2oz apple juice only    [] Bring inhalers day of surgery    [] Bring C-PAP/ Bi-Pap day of surgery    [] Bring urine specimen day of surgery    [x] Shower or bath with soap, lather and rinse well, AM of Surgery, no lotion, powders or creams to surgical site    [] Follow bowel prep as instructed per surgeon    [x] No tobacco products within 24 hours of surgery     [x] No alcohol or illegal drug use within 24 hours of surgery.     [x] Jewelry, body piercing's, eyeglasses, contact lenses and dentures are not permitted into surgery (bring cases)      [] Please do not wear any nail polish, make up or hair products on the day of surgery    [x] You can expect a call the business day prior to procedure to notify you if your arrival time changes    [x] If you receive a survey after surgery we would greatly appreciate your comments    [] Parent/guardian of a minor must accompany their child and remain on the premises  the entire time they are under our care     [] Pediatric patients may bring favorite toy, blanket or comfort item with them    [] A caregiver or family member must remain with the patient during their stay if they are mentally handicapped, have dementia, disoriented or unable to use a call light or would be a safety concern if left unattended    [x] Please notify surgeon if you develop any illness between now and time of surgery (cold, cough, sore throat, fever, nausea, vomiting) or any signs of infections  including skin, wounds, and dental.    []  The Outpatient Pharmacy is available to fill your prescription here on your day of surgery, ask your preop nurse for details    [] Other instructions    EDUCATIONAL MATERIALS PROVIDED:    [] PAT Preoperative Education Packet/Booklet     [] Medication List    [] Transfusion bracelet applied with instructions    [] Shower with soap, lather and rinse well, and use CHG wipes provided the evening before surgery as instructed    [] Incentive spirometer with instructions        Have you been tested for COVID  No           Have you been told you were positive for COVID No  Have you had any known exposure to someone that is positive for COVID No  Do you have a cough                   No              Do you have shortness of breath No                 Do you have a sore throat            No                Are you having chills                    No                Are you having muscle aches. No                    Please come to the hospital wearing a mask and have your significant other wear a mask as well. Both of you should check your temperature before leaving to come here,  if it is 100 or higher please call [phone] for instruction.

## 2021-11-30 ENCOUNTER — ANESTHESIA EVENT (OUTPATIENT)
Dept: OPERATING ROOM | Age: 71
End: 2021-11-30
Payer: OTHER GOVERNMENT

## 2021-12-01 ENCOUNTER — ANESTHESIA (OUTPATIENT)
Dept: OPERATING ROOM | Age: 71
End: 2021-12-01
Payer: OTHER GOVERNMENT

## 2021-12-01 ENCOUNTER — HOSPITAL ENCOUNTER (OUTPATIENT)
Dept: GENERAL RADIOLOGY | Age: 71
Setting detail: OUTPATIENT SURGERY
Discharge: HOME | End: 2021-12-03
Attending: UROLOGY
Payer: OTHER GOVERNMENT

## 2021-12-01 ENCOUNTER — HOSPITAL ENCOUNTER (OUTPATIENT)
Age: 71
Setting detail: OBSERVATION
Discharge: HOME | End: 2021-12-02
Attending: UROLOGY | Admitting: UROLOGY
Payer: OTHER GOVERNMENT

## 2021-12-01 VITALS
DIASTOLIC BLOOD PRESSURE: 47 MMHG | OXYGEN SATURATION: 95 % | SYSTOLIC BLOOD PRESSURE: 73 MMHG | RESPIRATION RATE: 2 BRPM | TEMPERATURE: 97.2 F

## 2021-12-01 DIAGNOSIS — Z90.79 S/P TURP: ICD-10-CM

## 2021-12-01 DIAGNOSIS — R52 PAIN: ICD-10-CM

## 2021-12-01 PROBLEM — N13.8 BPH WITH URINARY OBSTRUCTION: Status: ACTIVE | Noted: 2021-12-01

## 2021-12-01 PROBLEM — N40.1 BPH WITH URINARY OBSTRUCTION: Status: ACTIVE | Noted: 2021-12-01

## 2021-12-01 LAB — METER GLUCOSE: 168 MG/DL (ref 74–99)

## 2021-12-01 PROCEDURE — 2500000003 HC RX 250 WO HCPCS: Performed by: UROLOGY

## 2021-12-01 PROCEDURE — 6360000002 HC RX W HCPCS: Performed by: UROLOGY

## 2021-12-01 PROCEDURE — 6360000002 HC RX W HCPCS: Performed by: NURSE PRACTITIONER

## 2021-12-01 PROCEDURE — 6370000000 HC RX 637 (ALT 250 FOR IP): Performed by: UROLOGY

## 2021-12-01 PROCEDURE — 6360000002 HC RX W HCPCS: Performed by: ANESTHESIOLOGY

## 2021-12-01 PROCEDURE — C1887 CATHETER, GUIDING: HCPCS | Performed by: UROLOGY

## 2021-12-01 PROCEDURE — 6360000002 HC RX W HCPCS: Performed by: NURSE ANESTHETIST, CERTIFIED REGISTERED

## 2021-12-01 PROCEDURE — 3700000000 HC ANESTHESIA ATTENDED CARE: Performed by: UROLOGY

## 2021-12-01 PROCEDURE — 2580000003 HC RX 258: Performed by: UROLOGY

## 2021-12-01 PROCEDURE — 82962 GLUCOSE BLOOD TEST: CPT

## 2021-12-01 PROCEDURE — C1769 GUIDE WIRE: HCPCS | Performed by: UROLOGY

## 2021-12-01 PROCEDURE — 2720000010 HC SURG SUPPLY STERILE: Performed by: UROLOGY

## 2021-12-01 PROCEDURE — 3600000013 HC SURGERY LEVEL 3 ADDTL 15MIN: Performed by: UROLOGY

## 2021-12-01 PROCEDURE — 3600000003 HC SURGERY LEVEL 3 BASE: Performed by: UROLOGY

## 2021-12-01 PROCEDURE — 2500000003 HC RX 250 WO HCPCS: Performed by: NURSE ANESTHETIST, CERTIFIED REGISTERED

## 2021-12-01 PROCEDURE — 2580000003 HC RX 258: Performed by: NURSE PRACTITIONER

## 2021-12-01 PROCEDURE — G0378 HOSPITAL OBSERVATION PER HR: HCPCS

## 2021-12-01 PROCEDURE — 74420 UROGRAPHY RTRGR +-KUB: CPT

## 2021-12-01 PROCEDURE — 2709999900 HC NON-CHARGEABLE SUPPLY: Performed by: UROLOGY

## 2021-12-01 PROCEDURE — 7100000001 HC PACU RECOVERY - ADDTL 15 MIN: Performed by: UROLOGY

## 2021-12-01 PROCEDURE — 3700000001 HC ADD 15 MINUTES (ANESTHESIA): Performed by: UROLOGY

## 2021-12-01 PROCEDURE — 88305 TISSUE EXAM BY PATHOLOGIST: CPT

## 2021-12-01 PROCEDURE — 7100000000 HC PACU RECOVERY - FIRST 15 MIN: Performed by: UROLOGY

## 2021-12-01 RX ORDER — TAMSULOSIN HYDROCHLORIDE 0.4 MG/1
0.8 CAPSULE ORAL DAILY
Status: DISCONTINUED | OUTPATIENT
Start: 2021-12-01 | End: 2021-12-02 | Stop reason: HOSPADM

## 2021-12-01 RX ORDER — DEXAMETHASONE SODIUM PHOSPHATE 4 MG/ML
INJECTION, SOLUTION INTRA-ARTICULAR; INTRALESIONAL; INTRAMUSCULAR; INTRAVENOUS; SOFT TISSUE PRN
Status: DISCONTINUED | OUTPATIENT
Start: 2021-12-01 | End: 2021-12-01 | Stop reason: SDUPTHER

## 2021-12-01 RX ORDER — LEVOTHYROXINE SODIUM 0.03 MG/1
25 TABLET ORAL DAILY
Status: DISCONTINUED | OUTPATIENT
Start: 2021-12-01 | End: 2021-12-02 | Stop reason: HOSPADM

## 2021-12-01 RX ORDER — SODIUM CHLORIDE 0.9 % (FLUSH) 0.9 %
5-40 SYRINGE (ML) INJECTION PRN
Status: DISCONTINUED | OUTPATIENT
Start: 2021-12-01 | End: 2021-12-02 | Stop reason: HOSPADM

## 2021-12-01 RX ORDER — NIFEDIPINE 30 MG/1
30 TABLET, FILM COATED, EXTENDED RELEASE ORAL DAILY
Status: DISCONTINUED | OUTPATIENT
Start: 2021-12-01 | End: 2021-12-02 | Stop reason: HOSPADM

## 2021-12-01 RX ORDER — METOPROLOL SUCCINATE 50 MG/1
50 TABLET, EXTENDED RELEASE ORAL DAILY
Status: DISCONTINUED | OUTPATIENT
Start: 2021-12-02 | End: 2021-12-02 | Stop reason: HOSPADM

## 2021-12-01 RX ORDER — MIDAZOLAM HYDROCHLORIDE 1 MG/ML
INJECTION INTRAMUSCULAR; INTRAVENOUS PRN
Status: DISCONTINUED | OUTPATIENT
Start: 2021-12-01 | End: 2021-12-01 | Stop reason: SDUPTHER

## 2021-12-01 RX ORDER — LABETALOL HYDROCHLORIDE 5 MG/ML
5 INJECTION, SOLUTION INTRAVENOUS EVERY 10 MIN PRN
Status: DISCONTINUED | OUTPATIENT
Start: 2021-12-01 | End: 2021-12-01 | Stop reason: HOSPADM

## 2021-12-01 RX ORDER — EPHEDRINE SULFATE/0.9% NACL/PF 50 MG/5 ML
SYRINGE (ML) INTRAVENOUS PRN
Status: DISCONTINUED | OUTPATIENT
Start: 2021-12-01 | End: 2021-12-01 | Stop reason: SDUPTHER

## 2021-12-01 RX ORDER — LIDOCAINE HYDROCHLORIDE 20 MG/ML
INJECTION, SOLUTION EPIDURAL; INFILTRATION; INTRACAUDAL; PERINEURAL PRN
Status: DISCONTINUED | OUTPATIENT
Start: 2021-12-01 | End: 2021-12-01 | Stop reason: SDUPTHER

## 2021-12-01 RX ORDER — CIPROFLOXACIN 2 MG/ML
400 INJECTION, SOLUTION INTRAVENOUS EVERY 12 HOURS
Status: DISCONTINUED | OUTPATIENT
Start: 2021-12-01 | End: 2021-12-02 | Stop reason: HOSPADM

## 2021-12-01 RX ORDER — FENTANYL CITRATE 50 UG/ML
INJECTION, SOLUTION INTRAMUSCULAR; INTRAVENOUS PRN
Status: DISCONTINUED | OUTPATIENT
Start: 2021-12-01 | End: 2021-12-01 | Stop reason: SDUPTHER

## 2021-12-01 RX ORDER — SODIUM CHLORIDE 0.9 % (FLUSH) 0.9 %
5-40 SYRINGE (ML) INJECTION EVERY 12 HOURS SCHEDULED
Status: DISCONTINUED | OUTPATIENT
Start: 2021-12-01 | End: 2021-12-02 | Stop reason: HOSPADM

## 2021-12-01 RX ORDER — SODIUM CHLORIDE 9 MG/ML
25 INJECTION, SOLUTION INTRAVENOUS PRN
Status: DISCONTINUED | OUTPATIENT
Start: 2021-12-01 | End: 2021-12-02 | Stop reason: HOSPADM

## 2021-12-01 RX ORDER — SODIUM CHLORIDE 0.9 % (FLUSH) 0.9 %
5-40 SYRINGE (ML) INJECTION EVERY 12 HOURS SCHEDULED
Status: DISCONTINUED | OUTPATIENT
Start: 2021-12-01 | End: 2021-12-01 | Stop reason: HOSPADM

## 2021-12-01 RX ORDER — ONDANSETRON 2 MG/ML
INJECTION INTRAMUSCULAR; INTRAVENOUS PRN
Status: DISCONTINUED | OUTPATIENT
Start: 2021-12-01 | End: 2021-12-01 | Stop reason: SDUPTHER

## 2021-12-01 RX ORDER — DEXTROSE, SODIUM CHLORIDE, AND POTASSIUM CHLORIDE 5; .45; .15 G/100ML; G/100ML; G/100ML
INJECTION INTRAVENOUS CONTINUOUS
Status: DISCONTINUED | OUTPATIENT
Start: 2021-12-01 | End: 2021-12-02 | Stop reason: HOSPADM

## 2021-12-01 RX ORDER — PROPOFOL 10 MG/ML
INJECTION, EMULSION INTRAVENOUS PRN
Status: DISCONTINUED | OUTPATIENT
Start: 2021-12-01 | End: 2021-12-01 | Stop reason: SDUPTHER

## 2021-12-01 RX ORDER — GABAPENTIN 300 MG/1
600 CAPSULE ORAL NIGHTLY
Status: DISCONTINUED | OUTPATIENT
Start: 2021-12-01 | End: 2021-12-02 | Stop reason: HOSPADM

## 2021-12-01 RX ORDER — SODIUM CHLORIDE 9 MG/ML
25 INJECTION, SOLUTION INTRAVENOUS PRN
Status: DISCONTINUED | OUTPATIENT
Start: 2021-12-01 | End: 2021-12-01 | Stop reason: HOSPADM

## 2021-12-01 RX ORDER — NITROGLYCERIN 0.4 MG/1
0.4 TABLET SUBLINGUAL EVERY 5 MIN PRN
Status: DISCONTINUED | OUTPATIENT
Start: 2021-12-01 | End: 2021-12-02 | Stop reason: HOSPADM

## 2021-12-01 RX ORDER — MEPERIDINE HYDROCHLORIDE 25 MG/ML
12.5 INJECTION INTRAMUSCULAR; INTRAVENOUS; SUBCUTANEOUS EVERY 5 MIN PRN
Status: DISCONTINUED | OUTPATIENT
Start: 2021-12-01 | End: 2021-12-01 | Stop reason: HOSPADM

## 2021-12-01 RX ORDER — SODIUM CHLORIDE 0.9 % (FLUSH) 0.9 %
5-40 SYRINGE (ML) INJECTION PRN
Status: DISCONTINUED | OUTPATIENT
Start: 2021-12-01 | End: 2021-12-01 | Stop reason: HOSPADM

## 2021-12-01 RX ORDER — ASPIRIN 81 MG/1
81 TABLET ORAL DAILY
Status: DISCONTINUED | OUTPATIENT
Start: 2021-12-01 | End: 2021-12-02 | Stop reason: HOSPADM

## 2021-12-01 RX ORDER — PROMETHAZINE HYDROCHLORIDE 25 MG/ML
25 INJECTION, SOLUTION INTRAMUSCULAR; INTRAVENOUS PRN
Status: DISCONTINUED | OUTPATIENT
Start: 2021-12-01 | End: 2021-12-01 | Stop reason: HOSPADM

## 2021-12-01 RX ORDER — SODIUM CHLORIDE 9 MG/ML
INJECTION, SOLUTION INTRAVENOUS CONTINUOUS
Status: DISCONTINUED | OUTPATIENT
Start: 2021-12-01 | End: 2021-12-01

## 2021-12-01 RX ADMIN — DEXAMETHASONE SODIUM PHOSPHATE 10 MG: 4 INJECTION, SOLUTION INTRAMUSCULAR; INTRAVENOUS at 10:43

## 2021-12-01 RX ADMIN — Medication 5 MG: at 10:58

## 2021-12-01 RX ADMIN — PROPOFOL 200 MG: 10 INJECTION, EMULSION INTRAVENOUS at 10:38

## 2021-12-01 RX ADMIN — FENTANYL CITRATE 100 MCG: 50 INJECTION, SOLUTION INTRAMUSCULAR; INTRAVENOUS at 10:38

## 2021-12-01 RX ADMIN — ASPIRIN 81 MG: 81 TABLET, COATED ORAL at 17:03

## 2021-12-01 RX ADMIN — TAMSULOSIN HYDROCHLORIDE 0.8 MG: 0.4 CAPSULE ORAL at 21:02

## 2021-12-01 RX ADMIN — POTASSIUM CHLORIDE, DEXTROSE MONOHYDRATE AND SODIUM CHLORIDE: 150; 5; 450 INJECTION, SOLUTION INTRAVENOUS at 16:42

## 2021-12-01 RX ADMIN — GABAPENTIN 600 MG: 300 CAPSULE ORAL at 21:00

## 2021-12-01 RX ADMIN — SODIUM CHLORIDE: 9 INJECTION, SOLUTION INTRAVENOUS at 09:01

## 2021-12-01 RX ADMIN — LIDOCAINE HYDROCHLORIDE 100 MG: 20 INJECTION, SOLUTION EPIDURAL; INFILTRATION; INTRACAUDAL; PERINEURAL at 10:38

## 2021-12-01 RX ADMIN — METFORMIN HYDROCHLORIDE 1000 MG: 1000 TABLET ORAL at 17:02

## 2021-12-01 RX ADMIN — ONDANSETRON 4 MG: 2 INJECTION INTRAMUSCULAR; INTRAVENOUS at 10:43

## 2021-12-01 RX ADMIN — Medication 2000 MG: at 10:27

## 2021-12-01 RX ADMIN — MIDAZOLAM 1 MG: 1 INJECTION INTRAMUSCULAR; INTRAVENOUS at 10:27

## 2021-12-01 RX ADMIN — LEVOTHYROXINE SODIUM 25 MCG: 25 TABLET ORAL at 17:02

## 2021-12-01 RX ADMIN — MIDAZOLAM 1 MG: 1 INJECTION INTRAMUSCULAR; INTRAVENOUS at 10:36

## 2021-12-01 RX ADMIN — HYDROMORPHONE HYDROCHLORIDE 0.5 MG: 1 INJECTION, SOLUTION INTRAMUSCULAR; INTRAVENOUS; SUBCUTANEOUS at 12:38

## 2021-12-01 RX ADMIN — CIPROFLOXACIN 400 MG: 2 INJECTION, SOLUTION INTRAVENOUS at 17:03

## 2021-12-01 ASSESSMENT — PULMONARY FUNCTION TESTS
PIF_VALUE: 7
PIF_VALUE: 5
PIF_VALUE: 7
PIF_VALUE: 2
PIF_VALUE: 7
PIF_VALUE: 5
PIF_VALUE: 7
PIF_VALUE: 5
PIF_VALUE: 12
PIF_VALUE: 19
PIF_VALUE: 5
PIF_VALUE: 5
PIF_VALUE: 4
PIF_VALUE: 6
PIF_VALUE: 18
PIF_VALUE: 2
PIF_VALUE: 2
PIF_VALUE: 5
PIF_VALUE: 1
PIF_VALUE: 7
PIF_VALUE: 7
PIF_VALUE: 6
PIF_VALUE: 5
PIF_VALUE: 5
PIF_VALUE: 9
PIF_VALUE: 0
PIF_VALUE: 5
PIF_VALUE: 6
PIF_VALUE: 6
PIF_VALUE: 8
PIF_VALUE: 4
PIF_VALUE: 6
PIF_VALUE: 4
PIF_VALUE: 5
PIF_VALUE: 6
PIF_VALUE: 1
PIF_VALUE: 3
PIF_VALUE: 1
PIF_VALUE: 6
PIF_VALUE: 6
PIF_VALUE: 7
PIF_VALUE: 7
PIF_VALUE: 5
PIF_VALUE: 7
PIF_VALUE: 5
PIF_VALUE: 18
PIF_VALUE: 5
PIF_VALUE: 7
PIF_VALUE: 4
PIF_VALUE: 4
PIF_VALUE: 1
PIF_VALUE: 1
PIF_VALUE: 7
PIF_VALUE: 9
PIF_VALUE: 6
PIF_VALUE: 1
PIF_VALUE: 19
PIF_VALUE: 6
PIF_VALUE: 7

## 2021-12-01 ASSESSMENT — PAIN SCALES - GENERAL: PAINLEVEL_OUTOF10: 8

## 2021-12-01 ASSESSMENT — LIFESTYLE VARIABLES: SMOKING_STATUS: 0

## 2021-12-01 ASSESSMENT — PAIN - FUNCTIONAL ASSESSMENT: PAIN_FUNCTIONAL_ASSESSMENT: 0-10

## 2021-12-01 NOTE — ANESTHESIA POSTPROCEDURE EVALUATION
Department of Anesthesiology  Postprocedure Note    Patient: Bharathi Ortiz  MRN: [id number]  YOB: 1950  Date of evaluation: 12/1/2021  Time:  11:41 AM     Procedure Summary     Date: 12/01/21 Room / Location: [address]    Anesthesia Start: 1027 Anesthesia Stop: 1027    Procedure: CYSTOSCOPY RETROGRADE PYELOGRAM PLASMA LOOP TRANSURETHRAL RESECTION PROSTATE (N/A ) Diagnosis: (BPH)    Surgeons: Stephanie Rosen DO Responsible Provider: Kierra Aguilar MD    Anesthesia Type: general ASA Status: 3          Anesthesia Type: general    Fred Phase I: Fred Score: 10    Fred Phase II:      Last vitals: Reviewed and per EMR flowsheets.        Anesthesia Post Evaluation    Patient location during evaluation: PACU  Patient participation: complete - patient participated  Level of consciousness: awake  Airway patency: patent  Nausea & Vomiting: no nausea and no vomiting  Complications: no  Cardiovascular status: hemodynamically stable  Respiratory status: acceptable  Hydration status: euvolemic

## 2021-12-01 NOTE — OP NOTE
Operative Note        12/1/2021 10:10 AM  Service: Urology  Group: [facility] ([facility])    Jorge Rockwell  [id number]     SURGEON: Gustavo Mohan DO. PREOPERATIVE DIAGNOSES: BPH, bladder outlet obstruction, urinary retention. POSTOPERATIVE DIAGNOSES: BPH, bladder outlet obstruction, urinary retention. OPERATION: Cystoscopy, retrograde pyelograms, plasma loopTURP. ANESTHESIA:  General  ESTIMATED BLOOD LOSS: 200. COMPLICATIONS: None. CONDITION: PACU stable. MEDICATION PREOPERATIVELY: Antibiotics. DESCRIPTION OF THE PROCEDURE: Jorge Rockwell a  73 y.o. male who presented   to the hospital basically with the aforementioned complaint and issues and in   the outpatient setting, I did consent the patient for the above procedure. I   did extensively explain the risks, benefits and alternatives of the proposed   surgical procedure to the patient. The patient understood the risks,   benefits and alternatives and elected to proceed. PROCEDURE: This 73 y.o. male was brought to the   operative setting, placed in a supine position, induced with general   anesthesia. Anesthesia monitored the head and neck, airway, IV access, and   vital signs throughout the course of the case. Status post induction, the   patient was placed in the dorsal lithotomy position. All underlying points   of pressure were padded. SCDs were applied. He was prepped and draped in   the normal sterile fashion. We proceeded by taking a 21-Romanian ACMI cystoscope with a 30-degree lens inserted through the meatus in atraumatic fashion. Panendoscopic   visualization of the bladder was really devoid of any significant masses,   tumors, lesions, or defects. Please note the prostate did have somewhat of   an obstructive appearance. The right and left ureteral orifices basically   had normal appearance. It was at this point that I proceeded to do retrograde pyelograms.  I took an 8-Romanian cone-tip catheter and cannulated the left ureteral orifice. I did the exact same procedure on the right side. Both sides showed no masses, tumors, filling defects in the distal, mid, and proximal portions of the ureter. The renal pelvis showed no hydronephrosis, and appropiate cupping of the renal calyces. Basically, the patient did not have a substantially trabeculated bladder. I did subsequently at this point progress to removing the cystoscope ensuring the [facility] continuous flow 27-Omani resectoscope with the inner working channel manufactured for the plasma loop. Basically, I was able to   Systematically resect the prostate with a cut it at 200 and coagulate at 100, take down the median lobe,   the lateral lobe, the right lobe without complications. The external   urethral sphincter, verumontanum and ureteral orifices were intact at all   times. Please note there was no perforation to the prostatic capsule. Please note there was no injury to the bladder. Total resection was about 30 minutes. Blood loss was 200 cc. I was a able to remove an estimated 20 grams. I irrigated all the prostatic chips out. At the end of the case, I made sure that there was appropriate hemostasis. I went back and re-inspected the ureteral orifices. I did fill the patient's bladder up. I   drain it out and I was able to get a fairly good stream. Please note the   obturator was reinserted. I did get irrigate the small pathologic specimen   out. We are going to send this pathologic specimen for review. It was at   this point that the patient had a 20-Omani Perea catheter with a 30-mL   balloon inserted through the urethra. It was then placed in the bladder. Approximately 30 mL was placed in the balloon. It was clear upon completion   of the case. The patient awoke from anesthesia without complication, was brought to PACU   in a stable condition. Please note there were no intraoperative   complications. The patient will have the Perea catheter removed on Monday   of next week.  I will see him in 2 weeks to see where he stands.      Dictated by: Cassandra Nunez Marques, DO

## 2021-12-01 NOTE — ANESTHESIA PRE PROCEDURE
Department of Anesthesiology  Preprocedure Note       Name:  Apple Varela   Age:  73 y.o.  :  1950                                          MRN:  [id number]         Date:  2021      Surgeon: Arik Arndt):  Macrina BAILEY Memo, DO    Procedure: Procedure(s):  CYSTOSCOPY RETROGRADE PYELOGRAM PLASMA LOOP TRANSURETHRAL RESECTION PROSTATE    Medications prior to admission:   Prior to Admission medications    Medication Sig Start Date End Date Taking? Authorizing Provider   NIFEdipine (ADALAT CC) 30 MG extended release tablet Take 30 mg by mouth daily    Historical Provider, MD   metoprolol succinate (TOPROL XL) 50 MG extended release tablet Take 1 tablet by mouth daily  Patient taking differently: Take 25 mg by mouth 2 times daily  10/2/21   Haley Olson MD   gabapentin (NEURONTIN) 300 MG capsule Take 600 mg by mouth nightly. Historical Provider, MD   Cholecalciferol (VITAMIN D3) 5000 units TABS Take by mouth daily     Historical Provider, MD   Glucosamine HCl (GLUCOSAMINE MAXIMUM STRENGTH) 1500 MG TABS Take by mouth daily Indications: contains msm    Historical Provider, MD   aspirin 81 MG EC tablet Take 1 tablet by mouth daily 19   Cristo Nash MD   nitroGLYCERIN (NITROSTAT) 0.4 MG SL tablet up to max of 3 total doses. If no relief after 1 dose, call 911. 19   Cristo Nash MD   levothyroxine (SYNTHROID) 25 MCG tablet Take 25 mcg by mouth Daily    Historical Provider, MD   tamsulosin (FLOMAX) 0.4 MG capsule Take 0.8 mg by mouth daily    Historical Provider, MD   GLIPIZIDE PO Take 10 mg by mouth daily     Historical Provider, MD   metFORMIN (GLUCOPHAGE) 1000 MG tablet Take 1,000 mg by mouth 2 times daily (with meals)     Historical Provider, MD       Current medications:    No current facility-administered medications for this visit. No current outpatient medications on file.      Facility-Administered Medications Ordered in Other Visits   Medication Dose Route Frequency Provider Last Rate Last Admin  0.9 % sodium chloride infusion   IntraVENous Continuous NAEEM Woodward CNP   New Bag at 12/01/21 0901    0.9 % sodium chloride infusion  25 mL IntraVENous PRN NAEEM Woodward CNP        ceFAZolin (ANCEF) 2000 mg in sterile water 20 mL IV syringe  2,000 mg IntraVENous On Call to [address] Angel, APRN - CNP        sodium chloride flush 0.9 % injection 5-40 mL  5-40 mL IntraVENous 2 times per day NAEEM Woodward CNP        sodium chloride flush 0.9 % injection 5-40 mL  5-40 mL IntraVENous PRN NAEEM Woodward CNP           Allergies: Allergies   Allergen Reactions    Statins      States he is intolerant        Problem List:    Patient Active Problem List   Diagnosis Code    Chest pain R07.9    Elevated lipase R74.8    Accelerated hypertension I10    Uncontrolled type 2 diabetes mellitus with hyperglycemia ([facility] E11.65    Incomplete right bundle branch block I45.10    Headache R51.9    Obesity (BMI 30-39. 9) E66.9    Class 1 obesity due to excess calories in adult E66.09    Chest pain due to myocardial ischemia I25.9    Unstable angina (HCC) I20.0    Coronary artery disease due to lipid rich plaque I25.10, I25.83    RLQ abdominal pain R10.31    Right upper quadrant abdominal pain R10.11    Nausea and vomiting R11.2    DELORIS (acute kidney injury) ([facility] N17.9    BPH with urinary obstruction N40.1, N13.8       Past Medical History:        Diagnosis Date    Arthritis     Coronary artery disease due to lipid rich plaque 5/29/2019    Dr. Jenn Langford) F/U yearly Denies any cardiac symptoms at this time    Diabetes mellitus ([facility]     Hypercholesteremia     Hypertension     Retention of urine     Squamous cell carcinoma     Bottom lip had cancer cut off    Thyroid disease        Past Surgical History:        Procedure Laterality Date    CARDIAC SURGERY      may 2019 stents x2    CARPAL TUNNEL RELEASE      bilateral    CHOLECYSTECTOMY, LAPAROSCOPIC N/A 6/3/2021    LAPAROSCOPIC ROBOTIC ASSISTED SUBTOTALCHOLECYSTECTOMY performed by Avni Vyas MD at [address]  04/08/2019    Reso;[place] 2.25 x15mm and resolute macho 2.25x8 to 1st diagonal artery by DR Aruna Bernal      for his cancer on his lower lip    FRACTURE SURGERY      Fractured left ankle times two    FRACTURE SURGERY      broke 2 transverse processes in his back    HEMORRHOID SURGERY      LITHOTRIPSY Left 9/30/2021    CYSTOSCOPY RETROGRADE PYELOGRAM URETEROSCOPY LASER LITHOTRIPSY LEFT with stone basket retrieval performed by Baron Landau, MD at [address]      right    SKIN BIOPSY      TONSILLECTOMY         Social History:    Social History     Tobacco Use    Smoking status: Former Smoker    Smokeless tobacco: Former User     Quit date: 10/10/1988    Tobacco comment: Quit years ago when in [facility]   Substance Use Topics    Alcohol use: No                                Counseling given: Not Answered  Comment: Quit years ago when in [employer]      Vital Signs (Current): There were no vitals filed for this visit.                                            BP Readings from Last 3 Encounters:   12/01/21 (!) 144/67   10/09/21 138/74   10/01/21 130/61       NPO Status:                                                                                 BMI:   Wt Readings from Last 3 Encounters:   12/01/21 198 lb (89.8 kg)   10/08/21 198 lb 12.8 oz (90.2 kg)   10/01/21 202 lb (91.6 kg)     There is no height or weight on file to calculate BMI.    CBC:   Lab Results   Component Value Date    WBC 8.5 09/29/2021    RBC 4.56 09/29/2021    HGB 13.0 09/29/2021    HCT 38.5 09/29/2021    MCV 84.4 09/29/2021    RDW 13.2 09/29/2021     09/29/2021       CMP:   Lab Results   Component Value Date     09/29/2021    K 4.0 09/29/2021    K 3.5 09/29/2021     09/29/2021    CO2 22 09/29/2021 BUN 19 09/29/2021    CREATININE 1.9 09/29/2021    GFRAA 43 09/29/2021    LABGLOM 35 09/29/2021    GLUCOSE 193 09/29/2021    PROT 6.3 09/29/2021    CALCIUM 8.6 09/29/2021    BILITOT 0.4 09/29/2021    ALKPHOS 78 09/29/2021    AST 17 09/29/2021    ALT 15 09/29/2021       POC Tests: No results for input(s): POCGLU, POCNA, POCK, POCCL, POCBUN, POCHEMO, POCHCT in the last 72 hours. Coags:   Lab Results   Component Value Date    PROTIME 19.7 06/03/2021    INR 1.8 06/03/2021    APTT 29.8 05/14/2019       HCG (If Applicable): No results found for: PREGTESTUR, PREGSERUM, HCG, HCGQUANT     ABGs: No results found for: PHART, PO2ART, PHE7WNE, LUV9KXZ, BEART, M5CKRIWS     Type & Screen (If Applicable):  No results found for: LABABO, LABRH    Drug/Infectious Status (If Applicable):  No results found for: HIV, HEPCAB    COVID-19 Screening (If Applicable): No results found for: COVID19        Anesthesia Evaluation  Patient summary reviewed no history of anesthetic complications:   Airway: Mallampati: III  TM distance: >3 FB   Neck ROM: full   Dental:    (+) upper dentures      Pulmonary: breath sounds clear to auscultation  (+) sleep apnea: on noncompliant,      (-) not a current smoker                           Cardiovascular:    (+) hypertension:, angina:, CAD:, CABG/stent:,         Rhythm: regular  Rate: normal           Beta Blocker:  Dose within 24 Hrs         Neuro/Psych:   (+) headaches:,             GI/Hepatic/Renal:   (+) renal disease: CRI, morbid obesity          Endo/Other:    (+) DiabetesType II DM, poorly controlled, , hypothyroidism::., .                  ROS comment: BPH Abdominal:             Vascular: negative vascular ROS. Other Findings:               Anesthesia Plan      general     ASA 3       Induction: intravenous. MIPS: Postoperative opioids intended and Prophylactic antiemetics administered. Anesthetic plan and risks discussed with patient. Plan discussed with CRNA.             Patient seen and rvaluated  NAEEM Owen - LIBERTAD Lofton MD   12/1/2021

## 2021-12-01 NOTE — ANESTHESIA POSTPROCEDURE EVALUATION
Department of Anesthesiology  Postprocedure Note    Patient: Glenn Lala  MRN: [id number]  [place]: 1950  Date of evaluation: 12/1/2021  Time:  11:39 AM     Procedure Summary     Date: 12/01/21 Room / Location: [facility] OR 06 / [facility]    Anesthesia Start: 1027 Anesthesia Stop:     Procedure: CYSTOSCOPY RETROGRADE PYELOGRAM PLASMA LOOP TRANSURETHRAL RESECTION PROSTATE (N/A ) Diagnosis: (BPH)    Surgeons: Aniya Patrick DO Responsible Provider: Tory Castañeda MD    Anesthesia Type: general ASA Status: 3          Anesthesia Type: No value filed. Fred Phase I: Fred Score: 10    Fred Phase II:      Last vitals: Reviewed and per EMR flowsheets.        Anesthesia Post Evaluation    Patient location during evaluation: PACU  Patient participation: complete - patient participated  Level of consciousness: awake  Airway patency: patent  Nausea & Vomiting: no nausea and no vomiting  Complications: no  Cardiovascular status: hemodynamically stable  Respiratory status: acceptable  Hydration status: euvolemic

## 2021-12-02 VITALS
HEIGHT: 69 IN | BODY MASS INDEX: 29.33 KG/M2 | RESPIRATION RATE: 16 BRPM | SYSTOLIC BLOOD PRESSURE: 127 MMHG | OXYGEN SATURATION: 98 % | TEMPERATURE: 98.2 F | DIASTOLIC BLOOD PRESSURE: 66 MMHG | WEIGHT: 198 LBS | HEART RATE: 65 BPM

## 2021-12-02 LAB
HCT VFR BLD CALC: 36.9 % (ref 37–54)
HEMOGLOBIN: 12.5 G/DL (ref 12.5–16.5)
MCH RBC QN AUTO: 28.3 PG (ref 26–35)
MCHC RBC AUTO-ENTMCNC: 33.9 % (ref 32–34.5)
MCV RBC AUTO: 83.7 FL (ref 80–99.9)
PDW BLD-RTO: 13.2 FL (ref 11.5–15)
PLATELET # BLD: 287 E9/L (ref 130–450)
PMV BLD AUTO: 9.8 FL (ref 7–12)
RBC # BLD: 4.41 E12/L (ref 3.8–5.8)
WBC # BLD: 11.6 E9/L (ref 4.5–11.5)

## 2021-12-02 PROCEDURE — 6370000000 HC RX 637 (ALT 250 FOR IP): Performed by: UROLOGY

## 2021-12-02 PROCEDURE — 2500000003 HC RX 250 WO HCPCS: Performed by: UROLOGY

## 2021-12-02 PROCEDURE — 6360000002 HC RX W HCPCS: Performed by: UROLOGY

## 2021-12-02 PROCEDURE — 36415 COLL VENOUS BLD VENIPUNCTURE: CPT

## 2021-12-02 PROCEDURE — G0378 HOSPITAL OBSERVATION PER HR: HCPCS

## 2021-12-02 PROCEDURE — 85027 COMPLETE CBC AUTOMATED: CPT

## 2021-12-02 PROCEDURE — 2580000003 HC RX 258: Performed by: UROLOGY

## 2021-12-02 RX ADMIN — ASPIRIN 81 MG: 81 TABLET, COATED ORAL at 10:07

## 2021-12-02 RX ADMIN — LEVOTHYROXINE SODIUM 25 MCG: 25 TABLET ORAL at 06:30

## 2021-12-02 RX ADMIN — METOPROLOL SUCCINATE 50 MG: 50 TABLET, EXTENDED RELEASE ORAL at 10:07

## 2021-12-02 RX ADMIN — SODIUM CHLORIDE, PRESERVATIVE FREE 10 ML: 5 INJECTION INTRAVENOUS at 10:07

## 2021-12-02 RX ADMIN — POTASSIUM CHLORIDE, DEXTROSE MONOHYDRATE AND SODIUM CHLORIDE: 150; 5; 450 INJECTION, SOLUTION INTRAVENOUS at 04:47

## 2021-12-02 RX ADMIN — METFORMIN HYDROCHLORIDE 1000 MG: 1000 TABLET ORAL at 10:06

## 2021-12-02 RX ADMIN — NIFEDIPINE 30 MG: 30 TABLET, EXTENDED RELEASE ORAL at 10:07

## 2021-12-02 RX ADMIN — CIPROFLOXACIN 400 MG: 2 INJECTION, SOLUTION INTRAVENOUS at 04:47

## 2021-12-02 RX ADMIN — TAMSULOSIN HYDROCHLORIDE 0.8 MG: 0.4 CAPSULE ORAL at 10:06

## 2021-12-02 NOTE — PROGRESS NOTES
12/2/2021 8:19 AM  Service: Urology  Group: [facility] ([facility])    Mary Jo Mark  [id number]    Chief urologic compliant: BPH  HPI:  Patient is doing well  He feels well  He has not been able to ambulate  He has tolerated a diet      Review of Systems:  Respiratory: negative for cough and hemoptysis  Cardiovascular: negative for chest pain and dyspnea  Gastrointestinal: negative for abdominal pain, diarrhea, nausea and vomiting  Derm: negative for rash and skin lesion(s)  Neurological: negative for seizures and tremors  Endocrine: negative for diabetic symptoms including polydipsia and polyuria  : As above in the HPI, otherwise negative  All other reviews are negative     Allergies: Statins    Objective:   Vitals:    12/02/21 0730   BP: (!) 119/52   Pulse: 64   Resp: 18   Temp: 98 °F (36.7 °C)   SpO2: 97%       Neuro: A/A/O x3  Respiratory: non labored breathing  ABD: soft non-tender, non-distended  : ibarra clear on slow CBI  Ext: no clubbing, cyanosis, edema    Labs:   Recent Labs     12/02/21  0303   WBC 11.6*   RBC 4.41   HGB 12.5   HCT 36.9*   MCV 83.7   MCH 28.3   MCHC 33.9   RDW 13.2      MPV 9.8       No results for input(s): CREATININE in the last 72 hours.     Assessment: Mary Jo Mark 73 y.o. male     POD 1 TURP for BPH with YEN    Plan:    Cont ibarra  Stop CBI   Cap 3rd port  Ambulate  IS  Await pathology  DC home later today with ibarra  Ibarra teaching  Ibarra to be removed in the office on Monday of next week  Home going instructions reviewed  Rx's on chart  Patient instructed to call for any problems      Darrius Navarro, DO   [facility]rology

## 2021-12-02 NOTE — PLAN OF CARE
Problem: Falls - Risk of:  Goal: Will remain free from falls  Description: Will remain free from falls  Outcome: Met This Shift     Problem: Nutritional:  Goal: Nutritional status will improve  Description: Nutritional status will improve  Outcome: Met This Shift     Problem: Physical Regulation:  Goal: Will remain free from infection  Description: Will remain free from infection  Outcome: Met This Shift     Problem: Pain:  Goal: Pain level will decrease  Description: Pain level will decrease  Outcome: Met This Shift

## 2021-12-02 NOTE — CARE COORDINATION
Met w/ patient. Explained role of , OBS LOC, and plan of care. POD # 1 TURP. Lives w/ wife in a trailer- 2 steps to entrance. Independent PTA. PCP is Dr. Mario Serna @ [address] [place] [address] and utilizes [facility]. Per pt, plan is to return home on discharge- declining [address]- states has had a catheter for the last 2 months and is comfortable with self care of cath on discharge- states has no needs. Wife will provide transportation home.  Maribel Ortiz, RN case manager

## 2021-12-02 NOTE — PROGRESS NOTES
CBI stopped, 3rd port capped per order. Patient to walk in hallway this AM.  Patient given ibarra leg bag and night bag. RN to switch to leg bag and teach ibarra care.

## 2021-12-02 NOTE — DISCHARGE SUMMARY
Physician Discharge Summary     Patient ID:  Devi Roberts  [id number]  73 y.o.  1950    Admit date: 12/1/2021    Discharge date and time: 12/2/2021    Admitting Physician: Yuko Mohan DO     Admission Diagnoses: BPH with urinary obstruction [N40.1, N13.8]    Discharge Diagnoses: [address] Course:  UNEVENTFUL    Treatments: surgery: TURP    Disposition: home, stable    Patient Instructions:   Current Discharge Medication List      CONTINUE these medications which have NOT CHANGED    Details   NIFEdipine (ADALAT CC) 30 MG extended release tablet Take 30 mg by mouth daily      metoprolol succinate (TOPROL XL) 50 MG extended release tablet Take 1 tablet by mouth daily  Qty: 30 tablet, Refills: 3      gabapentin (NEURONTIN) 300 MG capsule Take 600 mg by mouth nightly. Cholecalciferol (VITAMIN D3) 5000 units TABS Take by mouth daily       Glucosamine HCl (GLUCOSAMINE MAXIMUM STRENGTH) 1500 MG TABS Take by mouth daily Indications: contains msm      aspirin 81 MG EC tablet Take 1 tablet by mouth daily  Qty: 30 tablet, Refills: 0      levothyroxine (SYNTHROID) 25 MCG tablet Take 25 mcg by mouth Daily      GLIPIZIDE PO Take 10 mg by mouth daily       metFORMIN (GLUCOPHAGE) 1000 MG tablet Take 1,000 mg by mouth 2 times daily (with meals)       nitroGLYCERIN (NITROSTAT) 0.4 MG SL tablet up to max of 3 total doses. If no relief after 1 dose, call 911.   Qty: 25 tablet, Refills: 0         STOP taking these medications       tamsulosin (FLOMAX) 0.4 MG capsule Comments:   Reason for Stopping:                 Signed:  Mauricio Mohan DO  12/2/2021  8:21 AM

## 2022-03-17 ENCOUNTER — HOSPITAL ENCOUNTER (OUTPATIENT)
Dept: NON INVASIVE DIAGNOSTICS | Age: 72
Discharge: HOME | End: 2022-03-17
Payer: OTHER GOVERNMENT

## 2022-03-17 ENCOUNTER — HOSPITAL ENCOUNTER (OUTPATIENT)
Dept: NUCLEAR MEDICINE | Age: 72
Discharge: HOME | End: 2022-03-17
Payer: OTHER GOVERNMENT

## 2022-03-17 VITALS — BODY MASS INDEX: 28.14 KG/M2 | HEIGHT: 69 IN | WEIGHT: 190 LBS

## 2022-03-17 LAB
LV EF: 64 %
LVEF MODALITY: NORMAL

## 2022-03-17 PROCEDURE — 78452 HT MUSCLE IMAGE SPECT MULT: CPT

## 2022-03-17 PROCEDURE — 3430000000 HC RX DIAGNOSTIC RADIOPHARMACEUTICAL: Performed by: RADIOLOGY

## 2022-03-17 PROCEDURE — 93016 CV STRESS TEST SUPVJ ONLY: CPT | Performed by: INTERNAL MEDICINE

## 2022-03-17 PROCEDURE — 93018 CV STRESS TEST I&R ONLY: CPT | Performed by: INTERNAL MEDICINE

## 2022-03-17 PROCEDURE — 93017 CV STRESS TEST TRACING ONLY: CPT

## 2022-03-17 PROCEDURE — A9500 TC99M SESTAMIBI: HCPCS | Performed by: RADIOLOGY

## 2022-03-17 PROCEDURE — 78452 HT MUSCLE IMAGE SPECT MULT: CPT | Performed by: INTERNAL MEDICINE

## 2022-03-17 RX ADMIN — Medication 30 MILLICURIE: at 08:53

## 2022-03-17 RX ADMIN — Medication 10 MILLICURIE: at 08:53

## 2022-03-17 NOTE — PROCEDURES
Exercise Nuclear Stress Test:    Cardiologist: Dr. Talha Garcia EKG: Sinus bradycardia, normal EKG    Indications for study: Chest pain     Exercise stress test was performed using the Andrew Protocol   No chest pain   Exercise time: 7:30, METs: 10, MPHR: 92%, Duke treadmill score: 7.5   No new arrhythmias   No EKG changes suggestive of stress induced ischemia   Average functional capacity   There was an appropriate BP and heart response to exercise and recovery   Low risk exercise stress test.    Nuclear images pending    Yoshi Lugo MD., [facility].    [facility]) Cardiology

## 2023-04-03 RX ORDER — CLOPIDOGREL BISULFATE 75 MG/1
TABLET ORAL
Qty: 90 TABLET | Refills: 0 | OUTPATIENT
Start: 2023-04-03

## 2023-09-11 ENCOUNTER — HOSPITAL ENCOUNTER (EMERGENCY)
Age: 73
Discharge: HOME | End: 2023-09-12
Payer: OTHER GOVERNMENT

## 2023-09-11 VITALS
TEMPERATURE: 98.1 F | WEIGHT: 180 LBS | OXYGEN SATURATION: 100 % | HEIGHT: 69 IN | RESPIRATION RATE: 18 BRPM | BODY MASS INDEX: 26.66 KG/M2 | DIASTOLIC BLOOD PRESSURE: 75 MMHG | HEART RATE: 66 BPM | SYSTOLIC BLOOD PRESSURE: 143 MMHG

## 2023-09-11 DIAGNOSIS — E87.6 HYPOKALEMIA: Primary | ICD-10-CM

## 2023-09-11 LAB
ANION GAP SERPL CALCULATED.3IONS-SCNC: 13 MMOL/L (ref 7–16)
BASOPHILS # BLD: 0.04 K/UL (ref 0–0.2)
BASOPHILS NFR BLD: 0 % (ref 0–2)
BUN SERPL-MCNC: 12 MG/DL (ref 6–23)
CALCIUM SERPL-MCNC: 8.9 MG/DL (ref 8.6–10.2)
CHLORIDE SERPL-SCNC: 97 MMOL/L (ref 98–107)
CO2 SERPL-SCNC: 23 MMOL/L (ref 22–29)
CREAT SERPL-MCNC: 1 MG/DL (ref 0.7–1.2)
EOSINOPHIL # BLD: 0.36 K/UL (ref 0.05–0.5)
EOSINOPHILS RELATIVE PERCENT: 4 % (ref 0–6)
ERYTHROCYTE [DISTWIDTH] IN BLOOD BY AUTOMATED COUNT: 14 % (ref 11.5–15)
GFR SERPL CREATININE-BSD FRML MDRD: >60 ML/MIN/1.73M2
GLUCOSE SERPL-MCNC: 118 MG/DL (ref 74–99)
HCT VFR BLD AUTO: 35.2 % (ref 37–54)
HGB BLD-MCNC: 11.1 G/DL (ref 12.5–16.5)
IMM GRANULOCYTES # BLD AUTO: 0.05 K/UL (ref 0–0.58)
IMM GRANULOCYTES NFR BLD: 1 % (ref 0–5)
LYMPHOCYTES NFR BLD: 2.03 K/UL (ref 1.5–4)
LYMPHOCYTES RELATIVE PERCENT: 21 % (ref 20–42)
MAGNESIUM SERPL-MCNC: 2.1 MG/DL (ref 1.6–2.6)
MCH RBC QN AUTO: 26.1 PG (ref 26–35)
MCHC RBC AUTO-ENTMCNC: 31.5 G/DL (ref 32–34.5)
MCV RBC AUTO: 82.6 FL (ref 80–99.9)
MONOCYTES NFR BLD: 0.86 K/UL (ref 0.1–0.95)
MONOCYTES NFR BLD: 9 % (ref 2–12)
NEUTROPHILS NFR BLD: 66 % (ref 43–80)
NEUTS SEG NFR BLD: 6.47 K/UL (ref 1.8–7.3)
PLATELET # BLD AUTO: 334 K/UL (ref 130–450)
PMV BLD AUTO: 9.4 FL (ref 7–12)
POTASSIUM SERPL-SCNC: 3.1 MMOL/L (ref 3.5–5)
RBC # BLD AUTO: 4.26 M/UL (ref 3.8–5.8)
SODIUM SERPL-SCNC: 133 MMOL/L (ref 132–146)
TROPONIN I SERPL HS-MCNC: 19 NG/L (ref 0–11)
TSH SERPL DL<=0.05 MIU/L-ACNC: 6.36 UIU/ML (ref 0.27–4.2)
WBC OTHER # BLD: 9.8 K/UL (ref 4.5–11.5)

## 2023-09-11 PROCEDURE — 93005 ELECTROCARDIOGRAM TRACING: CPT | Performed by: NURSE PRACTITIONER

## 2023-09-11 PROCEDURE — 6370000000 HC RX 637 (ALT 250 FOR IP): Performed by: PHYSICIAN ASSISTANT

## 2023-09-11 PROCEDURE — 84484 ASSAY OF TROPONIN QUANT: CPT

## 2023-09-11 PROCEDURE — 81001 URINALYSIS AUTO W/SCOPE: CPT

## 2023-09-11 PROCEDURE — 83735 ASSAY OF MAGNESIUM: CPT

## 2023-09-11 PROCEDURE — 84443 ASSAY THYROID STIM HORMONE: CPT

## 2023-09-11 PROCEDURE — 85025 COMPLETE CBC W/AUTO DIFF WBC: CPT

## 2023-09-11 PROCEDURE — 80048 BASIC METABOLIC PNL TOTAL CA: CPT

## 2023-09-11 PROCEDURE — 99284 EMERGENCY DEPT VISIT MOD MDM: CPT

## 2023-09-11 RX ORDER — POTASSIUM CHLORIDE 20 MEQ/1
40 TABLET, EXTENDED RELEASE ORAL ONCE
Status: COMPLETED | OUTPATIENT
Start: 2023-09-11 | End: 2023-09-11

## 2023-09-11 RX ADMIN — POTASSIUM CHLORIDE 40 MEQ: 1500 TABLET, EXTENDED RELEASE ORAL at 22:56

## 2023-09-11 NOTE — ED NOTES
Department of Emergency Medicine  FIRST PROVIDER TRIAGE NOTE             Independent MLP           9/11/23  5:02 PM EDT    Date of Encounter: 9/11/23   MRN: [id number]      HPI: Diana Teran is a 72 y.o. male who presents to the ED for Abnormal Lab (blood drawn today from [facility], K 2.4)    Patient had blood work done at the [facility] and was 2.4. Has no history of hypokalemia. States he has increased fatigue and tiredness. + weakness    ROS: Negative for cp or sob. PE: Gen Appearance/Constitutional: alert  Musculoskeletal: moves all extremities x 4     Initial Plan of Care: All treatment areas with department are currently occupied. Plan to order/Initiate the following while awaiting opening in ED: EKG.   Initiate Treatment-Testing, Proceed toTreatment Area When Bed Available for ED Attending/MLP to Continue Care    Electronically signed by NAEEM Contreras CNP   DD: 9/11/23       NAEEM Contreras CNP  09/11/23 2031

## 2023-09-12 LAB
BILIRUB UR QL STRIP: NEGATIVE
CLARITY UR: CLEAR
COLOR UR: YELLOW
EPI CELLS #/AREA URNS HPF: ABNORMAL /HPF
GLUCOSE UR STRIP-MCNC: NEGATIVE MG/DL
HGB UR QL STRIP.AUTO: ABNORMAL
KETONES UR STRIP-MCNC: 15 MG/DL
LEUKOCYTE ESTERASE UR QL STRIP: ABNORMAL
NITRITE UR QL STRIP: NEGATIVE
PH UR STRIP: 6 [PH] (ref 5–9)
PROT UR STRIP-MCNC: ABNORMAL MG/DL
RBC #/AREA URNS HPF: ABNORMAL /HPF
SP GR UR STRIP: 1.01 (ref 1–1.03)
TROPONIN I SERPL HS-MCNC: 19 NG/L (ref 0–11)
UROBILINOGEN UR STRIP-ACNC: 0.2 EU/DL (ref 0–1)
WBC #/AREA URNS HPF: ABNORMAL /HPF

## 2023-09-13 LAB
EKG ATRIAL RATE: 64 BPM
EKG P AXIS: 25 DEGREES
EKG P-R INTERVAL: 148 MS
EKG Q-T INTERVAL: 432 MS
EKG QRS DURATION: 112 MS
EKG QTC CALCULATION (BAZETT): 445 MS
EKG R AXIS: -29 DEGREES
EKG T AXIS: 13 DEGREES
EKG VENTRICULAR RATE: 64 BPM

## 2023-09-13 NOTE — ED PROVIDER NOTES
Granulocytes 1 0.0 - 5.0 %    Neutrophils Absolute 6.47 1.80 - 7.30 k/uL    Lymphocytes Absolute 2.03 1.50 - 4.00 k/uL    Monocytes Absolute 0.86 0.10 - 0.95 k/uL    Eosinophils Absolute 0.36 0.05 - 0.50 k/uL    Basophils Absolute 0.04 0.00 - 0.20 k/uL    Absolute Immature Granulocyte 0.05 0.00 - 0.58 k/uL   Urinalysis with Microscopic   Result Value Ref Range    Color, UA Yellow Yellow    Turbidity UA Clear Clear    Glucose, Ur NEGATIVE NEGATIVE mg/dL    Bilirubin Urine NEGATIVE NEGATIVE    Ketones, Urine 15 (A) NEGATIVE mg/dL    Specific Gravity, UA 1.015 1.005 - 1.030    Urine Hgb SMALL (A) NEGATIVE    pH, UA 6.0 5.0 - 9.0    Protein, UA TRACE (A) NEGATIVE mg/dL    Urobilinogen, Urine 0.2 0.0 - 1.0 EU/dL    Nitrite, Urine NEGATIVE NEGATIVE    Leukocyte Esterase, Urine TRACE (A) NEGATIVE    WBC, UA 0 TO 5 0 TO 5 /HPF    RBC, UA 0 TO 2 0 TO 2 /HPF    Epithelial Cells UA 0 TO 2 /HPF   TSH   Result Value Ref Range    TSH 6.36 (H) 0.27 - 4.20 uIU/mL   Troponin   Result Value Ref Range    Troponin, High Sensitivity 19 (H) 0 - 11 ng/L   Magnesium   Result Value Ref Range    Magnesium 2.1 1.6 - 2.6 mg/dL   Troponin   Result Value Ref Range    Troponin, High Sensitivity 19 (H) 0 - 11 ng/L   EKG 12 Lead   Result Value Ref Range    Ventricular Rate 64 BPM    Atrial Rate 64 BPM    P-R Interval 148 ms    QRS Duration 112 ms    Q-T Interval 432 ms    QTc Calculation (Bazett) 445 ms    P Axis 25 degrees    R Axis -29 degrees    T Axis 13 degrees     EKG #1:  Interpreted by emergency department attending physician unless otherwise noted. 9/13/23  Time: 1648    Rhythm: normal sinus   Rate: normal  Axis: normal  Conduction: normal  ST Segments: no acute change  T Waves: no acute change    Clinical Impression: NSR, Normal ecg, no acute changes  Comparison to Prior tracings: There are no significant changes when compared to prior tracings. Imaging: All Radiology results interpreted by Radiologist unless otherwise noted.   No

## 2024-05-12 ENCOUNTER — HOSPITAL ENCOUNTER (INPATIENT)
Age: 74
LOS: 3 days | Discharge: HOME | DRG: 445 | End: 2024-05-15
Attending: STUDENT IN AN ORGANIZED HEALTH CARE EDUCATION/TRAINING PROGRAM | Admitting: STUDENT IN AN ORGANIZED HEALTH CARE EDUCATION/TRAINING PROGRAM
Payer: OTHER GOVERNMENT

## 2024-05-12 DIAGNOSIS — N17.9 AKI (ACUTE KIDNEY INJURY) (HCC): Primary | ICD-10-CM

## 2024-05-12 PROBLEM — K83.09 CHOLANGITIS: Status: ACTIVE | Noted: 2024-05-12

## 2024-05-12 LAB
GLUCOSE BLD-MCNC: 131 MG/DL (ref 74–99)
GLUCOSE BLD-MCNC: 145 MG/DL (ref 74–99)
LACTATE BLDV-SCNC: 1.6 MMOL/L (ref 0.5–2.2)

## 2024-05-12 PROCEDURE — 82962 GLUCOSE BLOOD TEST: CPT

## 2024-05-12 PROCEDURE — 87154 CUL TYP ID BLD PTHGN 6+ TRGT: CPT

## 2024-05-12 PROCEDURE — 2060000000 HC ICU INTERMEDIATE R&B

## 2024-05-12 PROCEDURE — 99223 1ST HOSP IP/OBS HIGH 75: CPT | Performed by: STUDENT IN AN ORGANIZED HEALTH CARE EDUCATION/TRAINING PROGRAM

## 2024-05-12 PROCEDURE — 0F798DZ DILATION OF COMMON BILE DUCT WITH INTRALUMINAL DEVICE, VIA NATURAL OR ARTIFICIAL OPENING ENDOSCOPIC: ICD-10-PCS | Performed by: INTERNAL MEDICINE

## 2024-05-12 PROCEDURE — 87040 BLOOD CULTURE FOR BACTERIA: CPT

## 2024-05-12 PROCEDURE — BF101ZZ FLUOROSCOPY OF BILE DUCTS USING LOW OSMOLAR CONTRAST: ICD-10-PCS | Performed by: INTERNAL MEDICINE

## 2024-05-12 PROCEDURE — 83605 ASSAY OF LACTIC ACID: CPT

## 2024-05-12 PROCEDURE — 6370000000 HC RX 637 (ALT 250 FOR IP): Performed by: STUDENT IN AN ORGANIZED HEALTH CARE EDUCATION/TRAINING PROGRAM

## 2024-05-12 PROCEDURE — 6360000002 HC RX W HCPCS: Performed by: STUDENT IN AN ORGANIZED HEALTH CARE EDUCATION/TRAINING PROGRAM

## 2024-05-12 PROCEDURE — 0FC98ZZ EXTIRPATION OF MATTER FROM COMMON BILE DUCT, VIA NATURAL OR ARTIFICIAL OPENING ENDOSCOPIC: ICD-10-PCS | Performed by: INTERNAL MEDICINE

## 2024-05-12 PROCEDURE — 2580000003 HC RX 258: Performed by: STUDENT IN AN ORGANIZED HEALTH CARE EDUCATION/TRAINING PROGRAM

## 2024-05-12 RX ORDER — POTASSIUM CHLORIDE 1.5 G/1.58G
20 POWDER, FOR SOLUTION ORAL DAILY
COMMUNITY

## 2024-05-12 RX ORDER — ENOXAPARIN SODIUM 100 MG/ML
40 INJECTION SUBCUTANEOUS DAILY
Status: DISCONTINUED | OUTPATIENT
Start: 2024-05-12 | End: 2024-05-15 | Stop reason: HOSPADM

## 2024-05-12 RX ORDER — NIFEDIPINE 30 MG
30 TABLET, EXTENDED RELEASE ORAL DAILY
Status: DISCONTINUED | OUTPATIENT
Start: 2024-05-12 | End: 2024-05-15 | Stop reason: HOSPADM

## 2024-05-12 RX ORDER — ACETAMINOPHEN 650 MG/1
650 SUPPOSITORY RECTAL EVERY 6 HOURS PRN
Status: DISCONTINUED | OUTPATIENT
Start: 2024-05-12 | End: 2024-05-15 | Stop reason: HOSPADM

## 2024-05-12 RX ORDER — ONDANSETRON 2 MG/ML
4 INJECTION INTRAMUSCULAR; INTRAVENOUS EVERY 6 HOURS PRN
Status: DISCONTINUED | OUTPATIENT
Start: 2024-05-12 | End: 2024-05-15 | Stop reason: HOSPADM

## 2024-05-12 RX ORDER — CHLORAL HYDRATE 500 MG
1000 CAPSULE ORAL DAILY
COMMUNITY

## 2024-05-12 RX ORDER — LEVOTHYROXINE SODIUM 0.03 MG/1
25 TABLET ORAL DAILY
Status: DISCONTINUED | OUTPATIENT
Start: 2024-05-12 | End: 2024-05-15 | Stop reason: HOSPADM

## 2024-05-12 RX ORDER — OXYCODONE HYDROCHLORIDE 5 MG/1
5 TABLET ORAL EVERY 4 HOURS PRN
Status: DISCONTINUED | OUTPATIENT
Start: 2024-05-12 | End: 2024-05-15 | Stop reason: HOSPADM

## 2024-05-12 RX ORDER — INSULIN LISPRO 100 [IU]/ML
0-4 INJECTION, SOLUTION INTRAVENOUS; SUBCUTANEOUS
Status: DISCONTINUED | OUTPATIENT
Start: 2024-05-12 | End: 2024-05-15 | Stop reason: HOSPADM

## 2024-05-12 RX ORDER — POTASSIUM CHLORIDE 7.45 MG/ML
10 INJECTION INTRAVENOUS PRN
Status: DISCONTINUED | OUTPATIENT
Start: 2024-05-12 | End: 2024-05-15 | Stop reason: HOSPADM

## 2024-05-12 RX ORDER — MAGNESIUM SULFATE IN WATER 40 MG/ML
2000 INJECTION, SOLUTION INTRAVENOUS PRN
Status: DISCONTINUED | OUTPATIENT
Start: 2024-05-12 | End: 2024-05-15 | Stop reason: HOSPADM

## 2024-05-12 RX ORDER — ONDANSETRON 4 MG/1
4 TABLET, ORALLY DISINTEGRATING ORAL EVERY 8 HOURS PRN
Status: DISCONTINUED | OUTPATIENT
Start: 2024-05-12 | End: 2024-05-15 | Stop reason: HOSPADM

## 2024-05-12 RX ORDER — POTASSIUM CHLORIDE 750 MG/1
20 TABLET, EXTENDED RELEASE ORAL DAILY
Status: DISCONTINUED | OUTPATIENT
Start: 2024-05-12 | End: 2024-05-15 | Stop reason: HOSPADM

## 2024-05-12 RX ORDER — SODIUM CHLORIDE 0.9 % (FLUSH) 0.9 %
5-40 SYRINGE (ML) INJECTION PRN
Status: DISCONTINUED | OUTPATIENT
Start: 2024-05-12 | End: 2024-05-15 | Stop reason: HOSPADM

## 2024-05-12 RX ORDER — SODIUM CHLORIDE, SODIUM LACTATE, POTASSIUM CHLORIDE, CALCIUM CHLORIDE 600; 310; 30; 20 MG/100ML; MG/100ML; MG/100ML; MG/100ML
INJECTION, SOLUTION INTRAVENOUS CONTINUOUS
Status: DISCONTINUED | OUTPATIENT
Start: 2024-05-12 | End: 2024-05-15

## 2024-05-12 RX ORDER — ACETAMINOPHEN 325 MG/1
650 TABLET ORAL EVERY 6 HOURS PRN
Status: DISCONTINUED | OUTPATIENT
Start: 2024-05-12 | End: 2024-05-15 | Stop reason: HOSPADM

## 2024-05-12 RX ORDER — POTASSIUM CHLORIDE 20 MEQ/1
40 TABLET, EXTENDED RELEASE ORAL PRN
Status: DISCONTINUED | OUTPATIENT
Start: 2024-05-12 | End: 2024-05-15 | Stop reason: HOSPADM

## 2024-05-12 RX ORDER — POLYETHYLENE GLYCOL 3350 17 G/17G
17 POWDER, FOR SOLUTION ORAL DAILY PRN
Status: DISCONTINUED | OUTPATIENT
Start: 2024-05-12 | End: 2024-05-15 | Stop reason: HOSPADM

## 2024-05-12 RX ORDER — SODIUM CHLORIDE 0.9 % (FLUSH) 0.9 %
5-40 SYRINGE (ML) INJECTION EVERY 12 HOURS SCHEDULED
Status: DISCONTINUED | OUTPATIENT
Start: 2024-05-12 | End: 2024-05-15 | Stop reason: HOSPADM

## 2024-05-12 RX ORDER — METOPROLOL SUCCINATE 25 MG/1
25 TABLET, EXTENDED RELEASE ORAL 2 TIMES DAILY
Status: DISCONTINUED | OUTPATIENT
Start: 2024-05-12 | End: 2024-05-15 | Stop reason: HOSPADM

## 2024-05-12 RX ORDER — GABAPENTIN 300 MG/1
600 CAPSULE ORAL NIGHTLY
Status: DISCONTINUED | OUTPATIENT
Start: 2024-05-12 | End: 2024-05-15 | Stop reason: HOSPADM

## 2024-05-12 RX ORDER — ASPIRIN 81 MG/1
81 TABLET ORAL DAILY
Status: DISCONTINUED | OUTPATIENT
Start: 2024-05-12 | End: 2024-05-15 | Stop reason: HOSPADM

## 2024-05-12 RX ORDER — SODIUM CHLORIDE 9 MG/ML
INJECTION, SOLUTION INTRAVENOUS PRN
Status: DISCONTINUED | OUTPATIENT
Start: 2024-05-12 | End: 2024-05-15 | Stop reason: HOSPADM

## 2024-05-12 RX ORDER — INSULIN LISPRO 100 [IU]/ML
0-4 INJECTION, SOLUTION INTRAVENOUS; SUBCUTANEOUS NIGHTLY
Status: DISCONTINUED | OUTPATIENT
Start: 2024-05-12 | End: 2024-05-15 | Stop reason: HOSPADM

## 2024-05-12 RX ADMIN — SODIUM CHLORIDE, POTASSIUM CHLORIDE, SODIUM LACTATE AND CALCIUM CHLORIDE: 600; 310; 30; 20 INJECTION, SOLUTION INTRAVENOUS at 19:01

## 2024-05-12 RX ADMIN — ASPIRIN 81 MG: 81 TABLET, COATED ORAL at 18:41

## 2024-05-12 RX ADMIN — PIPERACILLIN AND TAZOBACTAM 3375 MG: 3; .375 INJECTION, POWDER, LYOPHILIZED, FOR SOLUTION INTRAVENOUS at 20:02

## 2024-05-12 RX ADMIN — NIFEDIPINE 30 MG: 30 TABLET, EXTENDED RELEASE ORAL at 18:41

## 2024-05-12 RX ADMIN — ENOXAPARIN SODIUM 40 MG: 100 INJECTION SUBCUTANEOUS at 16:06

## 2024-05-12 RX ADMIN — POTASSIUM CHLORIDE 20 MEQ: 750 TABLET, EXTENDED RELEASE ORAL at 18:41

## 2024-05-12 RX ADMIN — METOPROLOL SUCCINATE 25 MG: 25 TABLET, FILM COATED, EXTENDED RELEASE ORAL at 20:12

## 2024-05-12 RX ADMIN — GABAPENTIN 600 MG: 300 CAPSULE ORAL at 20:12

## 2024-05-12 ASSESSMENT — PAIN SCALES - GENERAL: PAINLEVEL_OUTOF10: 0

## 2024-05-12 NOTE — H&P
[facility] Hospitalist Group   History and Physical      CHIEF COMPLAINT:  Fever    History of Present Illness:  74 y.o. male with a history of CAD, DM, HLD, HTN, Thyroid Disease presented to [facility] with complaints of feeling unwell and fever Tmax 102 beginning last night per wife. Pt was seen preciously on May 10 , when CT A/P was performed. Demonstrated gallstones in the gallbladder with dilated CBD. Recommended MRCP. He was also found with elevated liver enzymes at that time. Pt declined US and preferred to be discharged home at that time. Pt noted history of cholecystectomy for ruptured gallbladder.Also noting history Colon cancer with partial resection and reversal of colostomy.     Initially planned to obtain US, however US not available. ED physician spoke with  who recommended MRCP.     VS upon arrival to [facility]: 101.2 94 16 97/56 95% ORA    5/12 0946 MRCP revealing Dilated CBD measuring 10mm with few 2-7mm stones. Dilated intrahepatic biliary tree. Recommend ERCP.     Pt received 2.5L LR bolus, Zosyn 4500mg IV and Ibuprofen 600mg in ED course.     Na+132  K+ 3.5 BUN/Crt 33/1.69 Glucose 160 Lactic Acid 4.6>2.8 WBC 9.1 H/H 12.4/35.6  Plt 150 Alk Phos 157  AST 78 TB 5.9   BC 1/2 Bottles +GNR    Pt arrived to room 624. Pt wife states on Friday he presented to [facility] for complaints of Abdominal pain and Nausea. He was discharged home.  States he developed fevers as high as 102 and returned to [facility] for further evaluation. States Dr. Marsh removed gallbladder. And does have a Hx of Colon Cancer. He states abdominal pain has improved. They are awaiting  for recommendations.     Informant(s) for H&P: Pt and EMR    REVIEW OF SYSTEMS:  Admits to Abdominal pain. Nausea, and fevers  Denies chills, cp, sob, n/v, ha, vision/hearing changes, wt changes, hot/cold flashes, other open skin lesions, diarrhea, constipation,

## 2024-05-13 ENCOUNTER — APPOINTMENT (OUTPATIENT)
Dept: GENERAL RADIOLOGY | Age: 74
DRG: 445 | End: 2024-05-13
Attending: STUDENT IN AN ORGANIZED HEALTH CARE EDUCATION/TRAINING PROGRAM
Payer: OTHER GOVERNMENT

## 2024-05-13 ENCOUNTER — ANESTHESIA (OUTPATIENT)
Dept: ENDOSCOPY | Age: 74
DRG: 445 | End: 2024-05-13
Payer: OTHER GOVERNMENT

## 2024-05-13 ENCOUNTER — ANESTHESIA EVENT (OUTPATIENT)
Dept: ENDOSCOPY | Age: 74
DRG: 445 | End: 2024-05-13
Payer: OTHER GOVERNMENT

## 2024-05-13 LAB
ALBUMIN SERPL-MCNC: 3 G/DL (ref 3.5–5.2)
ALP SERPL-CCNC: 117 U/L (ref 40–129)
ALT SERPL-CCNC: 137 U/L (ref 0–40)
ANION GAP SERPL CALCULATED.3IONS-SCNC: 12 MMOL/L (ref 7–16)
AST SERPL-CCNC: 55 U/L (ref 0–39)
BASOPHILS # BLD: 0.09 K/UL (ref 0–0.2)
BASOPHILS NFR BLD: 1 % (ref 0–2)
BILIRUB SERPL-MCNC: 4.4 MG/DL (ref 0–1.2)
BILIRUB UR QL STRIP: ABNORMAL
BUN SERPL-MCNC: 30 MG/DL (ref 6–23)
CALCIUM SERPL-MCNC: 9.3 MG/DL (ref 8.6–10.2)
CHLORIDE SERPL-SCNC: 101 MMOL/L (ref 98–107)
CHLORIDE UR-SCNC: 65 MMOL/L
CLARITY UR: CLEAR
CO2 SERPL-SCNC: 23 MMOL/L (ref 22–29)
COLOR UR: ABNORMAL
CREAT SERPL-MCNC: 1.4 MG/DL (ref 0.7–1.2)
CREAT UR-MCNC: 83.7 MG/DL (ref 40–278)
CREAT UR-MCNC: 84.8 MG/DL (ref 40–278)
EOSINOPHIL # BLD: 0.28 K/UL (ref 0.05–0.5)
EOSINOPHILS RELATIVE PERCENT: 3 % (ref 0–6)
ERYTHROCYTE [DISTWIDTH] IN BLOOD BY AUTOMATED COUNT: 14.1 % (ref 11.5–15)
GFR, ESTIMATED: 53 ML/MIN/1.73M2
GLUCOSE BLD-MCNC: 139 MG/DL (ref 74–99)
GLUCOSE BLD-MCNC: 143 MG/DL (ref 74–99)
GLUCOSE BLD-MCNC: 165 MG/DL (ref 74–99)
GLUCOSE SERPL-MCNC: 160 MG/DL (ref 74–99)
GLUCOSE UR STRIP-MCNC: 500 MG/DL
HCT VFR BLD AUTO: 36.6 % (ref 37–54)
HGB BLD-MCNC: 12.4 G/DL (ref 12.5–16.5)
HGB UR QL STRIP.AUTO: ABNORMAL
KETONES UR STRIP-MCNC: ABNORMAL MG/DL
LEUKOCYTE ESTERASE UR QL STRIP: NEGATIVE
LIPASE SERPL-CCNC: 40 U/L (ref 13–60)
LYMPHOCYTES NFR BLD: 1.14 K/UL (ref 1.5–4)
LYMPHOCYTES RELATIVE PERCENT: 10 % (ref 20–42)
MAGNESIUM SERPL-MCNC: 2.4 MG/DL (ref 1.6–2.6)
MCH RBC QN AUTO: 28.2 PG (ref 26–35)
MCHC RBC AUTO-ENTMCNC: 33.9 G/DL (ref 32–34.5)
MCV RBC AUTO: 83.2 FL (ref 80–99.9)
MICROALBUMIN UR-MCNC: 89 MG/L (ref 0–19)
MICROALBUMIN/CREAT UR-RTO: 106 MCG/MG CREAT (ref 0–30)
MONOCYTES NFR BLD: 0.38 K/UL (ref 0.1–0.95)
MONOCYTES NFR BLD: 3 % (ref 2–12)
NEUTROPHILS NFR BLD: 83 % (ref 43–80)
NEUTS SEG NFR BLD: 9.1 K/UL (ref 1.8–7.3)
NITRITE UR QL STRIP: NEGATIVE
PH UR STRIP: 6.5 [PH] (ref 5–9)
PLATELET # BLD AUTO: 142 K/UL (ref 130–450)
PMV BLD AUTO: 11 FL (ref 7–12)
POTASSIUM SERPL-SCNC: 3.3 MMOL/L (ref 3.5–5)
PROT SERPL-MCNC: 6.1 G/DL (ref 6.4–8.3)
PROT UR STRIP-MCNC: 100 MG/DL
RBC # BLD AUTO: 4.4 M/UL (ref 3.8–5.8)
RBC # BLD: ABNORMAL 10*6/UL
RBC #/AREA URNS HPF: NORMAL /HPF
SODIUM SERPL-SCNC: 136 MMOL/L (ref 132–146)
SODIUM UR-SCNC: 92 MMOL/L
SP GR UR STRIP: 1.02 (ref 1–1.03)
UROBILINOGEN UR STRIP-ACNC: 0.2 EU/DL (ref 0–1)
UUN UR-MCNC: 845 MG/DL (ref 800–1666)
WBC #/AREA URNS HPF: NORMAL /HPF
WBC OTHER # BLD: 11 K/UL (ref 4.5–11.5)

## 2024-05-13 PROCEDURE — 83690 ASSAY OF LIPASE: CPT

## 2024-05-13 PROCEDURE — 6370000000 HC RX 637 (ALT 250 FOR IP): Performed by: STUDENT IN AN ORGANIZED HEALTH CARE EDUCATION/TRAINING PROGRAM

## 2024-05-13 PROCEDURE — 2060000000 HC ICU INTERMEDIATE R&B

## 2024-05-13 PROCEDURE — 3609015200 HC ERCP REMOVE CALCULI/DEBRIS BILIARY/PANCREAS DUCT: Performed by: INTERNAL MEDICINE

## 2024-05-13 PROCEDURE — 2580000003 HC RX 258: Performed by: NURSE PRACTITIONER

## 2024-05-13 PROCEDURE — 84156 ASSAY OF PROTEIN URINE: CPT

## 2024-05-13 PROCEDURE — 84540 ASSAY OF URINE/UREA-N: CPT

## 2024-05-13 PROCEDURE — 6360000002 HC RX W HCPCS

## 2024-05-13 PROCEDURE — C1769 GUIDE WIRE: HCPCS | Performed by: INTERNAL MEDICINE

## 2024-05-13 PROCEDURE — 80053 COMPREHEN METABOLIC PANEL: CPT

## 2024-05-13 PROCEDURE — 84300 ASSAY OF URINE SODIUM: CPT

## 2024-05-13 PROCEDURE — 84166 PROTEIN E-PHORESIS/URINE/CSF: CPT

## 2024-05-13 PROCEDURE — 6370000000 HC RX 637 (ALT 250 FOR IP): Performed by: NURSE PRACTITIONER

## 2024-05-13 PROCEDURE — 82043 UR ALBUMIN QUANTITATIVE: CPT

## 2024-05-13 PROCEDURE — 6360000002 HC RX W HCPCS: Performed by: STUDENT IN AN ORGANIZED HEALTH CARE EDUCATION/TRAINING PROGRAM

## 2024-05-13 PROCEDURE — 2580000003 HC RX 258: Performed by: STUDENT IN AN ORGANIZED HEALTH CARE EDUCATION/TRAINING PROGRAM

## 2024-05-13 PROCEDURE — 87086 URINE CULTURE/COLONY COUNT: CPT

## 2024-05-13 PROCEDURE — 85025 COMPLETE CBC W/AUTO DIFF WBC: CPT

## 2024-05-13 PROCEDURE — 81001 URINALYSIS AUTO W/SCOPE: CPT

## 2024-05-13 PROCEDURE — 6370000000 HC RX 637 (ALT 250 FOR IP): Performed by: INTERNAL MEDICINE

## 2024-05-13 PROCEDURE — 6360000004 HC RX CONTRAST MEDICATION: Performed by: INTERNAL MEDICINE

## 2024-05-13 PROCEDURE — 99233 SBSQ HOSP IP/OBS HIGH 50: CPT | Performed by: STUDENT IN AN ORGANIZED HEALTH CARE EDUCATION/TRAINING PROGRAM

## 2024-05-13 PROCEDURE — 82570 ASSAY OF URINE CREATININE: CPT

## 2024-05-13 PROCEDURE — 2709999900 HC NON-CHARGEABLE SUPPLY: Performed by: INTERNAL MEDICINE

## 2024-05-13 PROCEDURE — 86335 IMMUNFIX E-PHORSIS/URINE/CSF: CPT

## 2024-05-13 PROCEDURE — 82962 GLUCOSE BLOOD TEST: CPT

## 2024-05-13 PROCEDURE — 2720000010 HC SURG SUPPLY STERILE: Performed by: INTERNAL MEDICINE

## 2024-05-13 PROCEDURE — 83735 ASSAY OF MAGNESIUM: CPT

## 2024-05-13 PROCEDURE — 74330 X-RAY BILE/PANC ENDOSCOPY: CPT

## 2024-05-13 PROCEDURE — 2580000003 HC RX 258

## 2024-05-13 PROCEDURE — 82436 ASSAY OF URINE CHLORIDE: CPT

## 2024-05-13 PROCEDURE — 3700000000 HC ANESTHESIA ATTENDED CARE: Performed by: INTERNAL MEDICINE

## 2024-05-13 PROCEDURE — 3700000001 HC ADD 15 MINUTES (ANESTHESIA): Performed by: INTERNAL MEDICINE

## 2024-05-13 PROCEDURE — 7100000010 HC PHASE II RECOVERY - FIRST 15 MIN: Performed by: INTERNAL MEDICINE

## 2024-05-13 PROCEDURE — 6360000002 HC RX W HCPCS: Performed by: INTERNAL MEDICINE

## 2024-05-13 PROCEDURE — 2580000003 HC RX 258: Performed by: INTERNAL MEDICINE

## 2024-05-13 PROCEDURE — 7100000011 HC PHASE II RECOVERY - ADDTL 15 MIN: Performed by: INTERNAL MEDICINE

## 2024-05-13 PROCEDURE — C1874 STENT, COATED/COV W/DEL SYS: HCPCS | Performed by: INTERNAL MEDICINE

## 2024-05-13 DEVICE — STENT SYSTEM RMV
Type: IMPLANTABLE DEVICE | Status: FUNCTIONAL
Brand: WALLFLEX BILIARY

## 2024-05-13 RX ORDER — IBUPROFEN 600 MG/1
TABLET ORAL PRN
Status: DISCONTINUED | OUTPATIENT
Start: 2024-05-13 | End: 2024-05-13

## 2024-05-13 RX ORDER — PROPOFOL 10 MG/ML
INJECTION, EMULSION INTRAVENOUS PRN
Status: DISCONTINUED | OUTPATIENT
Start: 2024-05-13 | End: 2024-05-13 | Stop reason: SDUPTHER

## 2024-05-13 RX ORDER — INDOMETHACIN 100 MG
100 SUPPOSITORY, RECTAL RECTAL ONCE
Status: COMPLETED | OUTPATIENT
Start: 2024-05-13 | End: 2024-05-13

## 2024-05-13 RX ORDER — IOPAMIDOL 612 MG/ML
29 INJECTION, SOLUTION INTRATHECAL
Status: COMPLETED | OUTPATIENT
Start: 2024-05-13 | End: 2024-05-13

## 2024-05-13 RX ORDER — SODIUM CHLORIDE 9 MG/ML
INJECTION, SOLUTION INTRAVENOUS CONTINUOUS PRN
Status: DISCONTINUED | OUTPATIENT
Start: 2024-05-13 | End: 2024-05-13 | Stop reason: SDUPTHER

## 2024-05-13 RX ORDER — SODIUM CHLORIDE, SODIUM LACTATE, POTASSIUM CHLORIDE, AND CALCIUM CHLORIDE .6; .31; .03; .02 G/100ML; G/100ML; G/100ML; G/100ML
800 INJECTION, SOLUTION INTRAVENOUS ONCE
Status: COMPLETED | OUTPATIENT
Start: 2024-05-13 | End: 2024-05-13

## 2024-05-13 RX ORDER — POTASSIUM CHLORIDE 20 MEQ/1
40 TABLET, EXTENDED RELEASE ORAL ONCE
Status: DISCONTINUED | OUTPATIENT
Start: 2024-05-13 | End: 2024-05-13

## 2024-05-13 RX ORDER — IBUPROFEN 600 MG/1
TABLET ORAL PRN
Status: DISCONTINUED | OUTPATIENT
Start: 2024-05-13 | End: 2024-05-13 | Stop reason: SDUPTHER

## 2024-05-13 RX ADMIN — POTASSIUM CHLORIDE 20 MEQ: 750 TABLET, EXTENDED RELEASE ORAL at 10:37

## 2024-05-13 RX ADMIN — PIPERACILLIN AND TAZOBACTAM 3375 MG: 3; .375 INJECTION, POWDER, LYOPHILIZED, FOR SOLUTION INTRAVENOUS at 10:52

## 2024-05-13 RX ADMIN — GABAPENTIN 600 MG: 300 CAPSULE ORAL at 20:48

## 2024-05-13 RX ADMIN — ONDANSETRON 4 MG: 2 INJECTION INTRAMUSCULAR; INTRAVENOUS at 02:17

## 2024-05-13 RX ADMIN — PIPERACILLIN AND TAZOBACTAM 3375 MG: 3; .375 INJECTION, POWDER, LYOPHILIZED, FOR SOLUTION INTRAVENOUS at 18:30

## 2024-05-13 RX ADMIN — SODIUM CHLORIDE, POTASSIUM CHLORIDE, SODIUM LACTATE AND CALCIUM CHLORIDE: 600; 310; 30; 20 INJECTION, SOLUTION INTRAVENOUS at 22:32

## 2024-05-13 RX ADMIN — PROPOFOL 610 MG: 10 INJECTION, EMULSION INTRAVENOUS at 13:04

## 2024-05-13 RX ADMIN — NIFEDIPINE 30 MG: 30 TABLET, EXTENDED RELEASE ORAL at 10:37

## 2024-05-13 RX ADMIN — SODIUM CHLORIDE: 9 INJECTION, SOLUTION INTRAVENOUS at 12:51

## 2024-05-13 RX ADMIN — SODIUM CHLORIDE, POTASSIUM CHLORIDE, SODIUM LACTATE AND CALCIUM CHLORIDE 800 ML: 600; 310; 30; 20 INJECTION, SOLUTION INTRAVENOUS at 10:53

## 2024-05-13 RX ADMIN — POTASSIUM CHLORIDE 40 MEQ: 1500 TABLET, EXTENDED RELEASE ORAL at 03:03

## 2024-05-13 RX ADMIN — POLYETHYLENE GLYCOL 3350 17 G: 17 POWDER, FOR SOLUTION ORAL at 18:32

## 2024-05-13 RX ADMIN — Medication 100 MG: at 12:14

## 2024-05-13 RX ADMIN — PIPERACILLIN AND TAZOBACTAM 3375 MG: 3; .375 INJECTION, POWDER, LYOPHILIZED, FOR SOLUTION INTRAVENOUS at 02:59

## 2024-05-13 RX ADMIN — Medication 0.25 MG: at 13:13

## 2024-05-13 RX ADMIN — METOPROLOL SUCCINATE 25 MG: 25 TABLET, FILM COATED, EXTENDED RELEASE ORAL at 20:48

## 2024-05-13 RX ADMIN — METOPROLOL SUCCINATE 25 MG: 25 TABLET, FILM COATED, EXTENDED RELEASE ORAL at 10:38

## 2024-05-13 RX ADMIN — IOPAMIDOL 29 ML: 612 INJECTION, SOLUTION INTRATHECAL at 13:45

## 2024-05-13 ASSESSMENT — LIFESTYLE VARIABLES: SMOKING_STATUS: 0

## 2024-05-13 NOTE — ACP (ADVANCE CARE PLANNING)
Advance Care Planning   Healthcare Decision Maker:    Primary Decision Maker: phani reyes - [facility] - [phone]    Click here to complete Healthcare Decision Makers including selection of the Healthcare Decision Maker Relationship (ie \"Primary\").

## 2024-05-13 NOTE — CARE COORDINATION
Social work / Discharge planning:         Social work met with patient and his wife at bedside for initial assessment.    They live in a mobile home with a few steps to enter.     He owns a walker and cane that he uses as needed.    No history of HC or MARISABEL.    Patient states that he has all of his necessary diabetic supplies.    He sees Arabella DeL a Cruz CNP in the [facility] but [facility]     Patient anticipates discharge home with no needs.    Per IDR, plan is for ERCP.   Nephrology consulted.    Electronically signed by SOL Roca on 5/13/2024 at 10:48 AM

## 2024-05-13 NOTE — ANESTHESIA PRE PROCEDURE
Department of Anesthesiology  Preprocedure Note       Name:  Kentrell Ybarra   Age:  74 y.o.  :  1950                                          MRN:  [id number]         Date:  2024      Surgeon: Surgeon(s):  Emile Soto MD    Procedure: Procedure(s):  ENDOSCOPIC RETROGRADE CHOLANGIOPANCREATOGRAPHY    Medications prior to admission:   Prior to Admission medications    Medication Sig Start Date End Date Taking? Authorizing Provider   potassium chloride (KLOR-CON) 20 MEQ packet Take 20 mEq by mouth daily   Yes Sacha Altman MD   Omega-3 Fatty Acids (FISH OIL) 1000 MG capsule Take 1 capsule by mouth daily   Yes Sacha Altman MD   NIFEdipine (ADALAT CC) 30 MG extended release tablet Take 1 tablet by mouth daily    Sacha Altman MD   metoprolol succinate (TOPROL XL) 50 MG extended release tablet Take 1 tablet by mouth daily  Patient taking differently: Take 0.5 tablets by mouth 2 times daily 10/2/21   Giulia Shaikh MD   gabapentin (NEURONTIN) 300 MG capsule Take 2 capsules by mouth nightly.    Sacha Altman MD   Cholecalciferol (VITAMIN D3) 5000 units TABS Take by mouth daily     Sacha Altman MD   Glucosamine HCl (GLUCOSAMINE MAXIMUM STRENGTH) 1500 MG TABS Take by mouth daily Indications: contains msm  Patient not taking: Reported on 2024    Sacha Altman MD   aspirin 81 MG EC tablet Take 1 tablet by mouth daily 19   Ildefonso Adams MD   nitroGLYCERIN (NITROSTAT) 0.4 MG SL tablet up to max of 3 total doses. If no relief after 1 dose, call 911.  Patient not taking: Reported on 2024   Ildefonso Adams MD   levothyroxine (SYNTHROID) 25 MCG tablet Take 1 tablet by mouth Daily    Sacha Altman MD   GLIPIZIDE PO Take 10 mg by mouth daily     Sacha Altman MD   metFORMIN (GLUCOPHAGE) 1000 MG tablet Take 1 tablet by mouth 2 times daily (with meals)    Sacha Altman MD       Current medications:    Current

## 2024-05-13 NOTE — BRIEF OP NOTE
Brief Postoperative Note    Kentrell Ybarra  YOB: 1950  [id number]    Procedure:  ERCP    Anesthesia: LMAC      Surgeon:  DAVID Soto MD      Findings: CBD: IMPACTED STONE AT THE PAPILLA WITH A SUPRAPAPILLARY FISTULA.                   MASSIVE CBD DILATATION . PAPILLOTOMY WAS DONE , LARGE PAPILLOTOMY WAS DONE AND LARGE STONE WAS REMOVED AND 10X60 WALL STENT WAS PLACED                      PD: NOT ENTERED                         Complications: None      Estimated blood loss: none        Sayed LOS Soto MD

## 2024-05-13 NOTE — PLAN OF CARE
Problem: Safety - Adult  Goal: Free from fall injury  5/12/2024 2020 by Naima Song, RN  Outcome: Progressing  5/12/2024 1535 by Virginie Tillman, RN  Outcome: Progressing     Problem: Gastrointestinal - Adult  Goal: Minimal or absence of nausea and vomiting  Outcome: Progressing     Problem: Gastrointestinal - Adult  Goal: Maintains or returns to baseline bowel function  Outcome: Progressing     Problem: Gastrointestinal - Adult  Goal: Maintains adequate nutritional intake  Outcome: Progressing

## 2024-05-13 NOTE — ANESTHESIA POSTPROCEDURE EVALUATION
Department of Anesthesiology  Postprocedure Note    Patient: Kentrell Ybarra  MRN: [id number]  YOB: 1950  Date of evaluation: 5/13/2024    Procedure Summary       Date: 05/13/24 Room / Location: [facility]    Anesthesia Start: 1301 Anesthesia Stop: 1336    Procedures:       ENDOSCOPIC RETROGRADE CHOLANGIOPANCREATOGRAPHY      ENDOSCOPIC RETROGRADE CHOLANGIOPANCREATOGRAPHY STONE REMOVAL      ENDOSCOPIC RETROGRADE CHOLANGIOPANCREATOGRAPHY SPHINCTER/PAPILLOTOMY      ENDOSCOPIC RETROGRADE CHOLANGIOPANCREATOGRAPHY STONE LITHOTRIPSY Diagnosis:       Cholangitis      (Cholangitis [K83.09])    Surgeons: Emile Soto MD Responsible Provider: Luz Palacios DO    Anesthesia Type: MAC ASA Status: 3            Anesthesia Type: No value filed.    Fred Phase I:      Fred Phase II:      Anesthesia Post Evaluation    Patient location during evaluation: PACU  Patient participation: complete - patient participated  Level of consciousness: awake and alert  Pain score: 0  Airway patency: patent  Nausea & Vomiting: no nausea and no vomiting  Cardiovascular status: blood pressure returned to baseline  Respiratory status: acceptable  Hydration status: euvolemic        No notable events documented.

## 2024-05-14 LAB
ALBUMIN SERPL-MCNC: 3 G/DL (ref 3.5–5.2)
ALP SERPL-CCNC: 111 U/L (ref 40–129)
ALT SERPL-CCNC: 84 U/L (ref 0–40)
ANION GAP SERPL CALCULATED.3IONS-SCNC: 12 MMOL/L (ref 7–16)
AST SERPL-CCNC: 25 U/L (ref 0–39)
BASOPHILS # BLD: 0.02 K/UL (ref 0–0.2)
BASOPHILS NFR BLD: 0 % (ref 0–2)
BILIRUB SERPL-MCNC: 3.2 MG/DL (ref 0–1.2)
BUN SERPL-MCNC: 24 MG/DL (ref 6–23)
CALCIUM SERPL-MCNC: 8.2 MG/DL (ref 8.6–10.2)
CHLORIDE SERPL-SCNC: 106 MMOL/L (ref 98–107)
CO2 SERPL-SCNC: 20 MMOL/L (ref 22–29)
CREAT SERPL-MCNC: 1.2 MG/DL (ref 0.7–1.2)
EOSINOPHIL # BLD: 0.25 K/UL (ref 0.05–0.5)
EOSINOPHILS RELATIVE PERCENT: 3 % (ref 0–6)
ERYTHROCYTE [DISTWIDTH] IN BLOOD BY AUTOMATED COUNT: 14.6 % (ref 11.5–15)
FERRITIN SERPL-MCNC: 152 NG/ML
FOLATE SERPL-MCNC: 4.2 NG/ML (ref 4.8–24.2)
GFR, ESTIMATED: 68 ML/MIN/1.73M2
GLUCOSE BLD-MCNC: 142 MG/DL (ref 74–99)
GLUCOSE BLD-MCNC: 146 MG/DL (ref 74–99)
GLUCOSE BLD-MCNC: 166 MG/DL (ref 74–99)
GLUCOSE SERPL-MCNC: 114 MG/DL (ref 74–99)
HCT VFR BLD AUTO: 36.3 % (ref 37–54)
HGB BLD-MCNC: 11.9 G/DL (ref 12.5–16.5)
IMM GRANULOCYTES # BLD AUTO: 0.05 K/UL (ref 0–0.58)
IMM GRANULOCYTES NFR BLD: 1 % (ref 0–5)
IRON SATN MFR SERPL: 48 % (ref 20–55)
IRON SERPL-MCNC: 107 UG/DL (ref 59–158)
LIPASE SERPL-CCNC: 36 U/L (ref 13–60)
LYMPHOCYTES NFR BLD: 1.24 K/UL (ref 1.5–4)
LYMPHOCYTES RELATIVE PERCENT: 17 % (ref 20–42)
MAGNESIUM SERPL-MCNC: 2.2 MG/DL (ref 1.6–2.6)
MCH RBC QN AUTO: 27.9 PG (ref 26–35)
MCHC RBC AUTO-ENTMCNC: 32.8 G/DL (ref 32–34.5)
MCV RBC AUTO: 85 FL (ref 80–99.9)
MONOCYTES NFR BLD: 0.66 K/UL (ref 0.1–0.95)
MONOCYTES NFR BLD: 9 % (ref 2–12)
NEUTROPHILS NFR BLD: 70 % (ref 43–80)
NEUTS SEG NFR BLD: 5.1 K/UL (ref 1.8–7.3)
PLATELET # BLD AUTO: 155 K/UL (ref 130–450)
PMV BLD AUTO: 10.8 FL (ref 7–12)
POTASSIUM SERPL-SCNC: 3.8 MMOL/L (ref 3.5–5)
PROT SERPL-MCNC: 6 G/DL (ref 6.4–8.3)
RBC # BLD AUTO: 4.27 M/UL (ref 3.8–5.8)
SODIUM SERPL-SCNC: 138 MMOL/L (ref 132–146)
TIBC SERPL-MCNC: 222 UG/DL (ref 250–450)
VIT B12 SERPL-MCNC: 169 PG/ML (ref 211–946)
WBC OTHER # BLD: 7.3 K/UL (ref 4.5–11.5)

## 2024-05-14 PROCEDURE — 83735 ASSAY OF MAGNESIUM: CPT

## 2024-05-14 PROCEDURE — 84155 ASSAY OF PROTEIN SERUM: CPT

## 2024-05-14 PROCEDURE — 85025 COMPLETE CBC W/AUTO DIFF WBC: CPT

## 2024-05-14 PROCEDURE — 36415 COLL VENOUS BLD VENIPUNCTURE: CPT

## 2024-05-14 PROCEDURE — 83540 ASSAY OF IRON: CPT

## 2024-05-14 PROCEDURE — 6360000002 HC RX W HCPCS: Performed by: INTERNAL MEDICINE

## 2024-05-14 PROCEDURE — 83690 ASSAY OF LIPASE: CPT

## 2024-05-14 PROCEDURE — 82962 GLUCOSE BLOOD TEST: CPT

## 2024-05-14 PROCEDURE — 80053 COMPREHEN METABOLIC PANEL: CPT

## 2024-05-14 PROCEDURE — 82728 ASSAY OF FERRITIN: CPT

## 2024-05-14 PROCEDURE — 82746 ASSAY OF FOLIC ACID SERUM: CPT

## 2024-05-14 PROCEDURE — 2060000000 HC ICU INTERMEDIATE R&B

## 2024-05-14 PROCEDURE — 99233 SBSQ HOSP IP/OBS HIGH 50: CPT | Performed by: INTERNAL MEDICINE

## 2024-05-14 PROCEDURE — 84165 PROTEIN E-PHORESIS SERUM: CPT

## 2024-05-14 PROCEDURE — 6370000000 HC RX 637 (ALT 250 FOR IP): Performed by: INTERNAL MEDICINE

## 2024-05-14 PROCEDURE — 83550 IRON BINDING TEST: CPT

## 2024-05-14 PROCEDURE — 82607 VITAMIN B-12: CPT

## 2024-05-14 PROCEDURE — 2580000003 HC RX 258: Performed by: INTERNAL MEDICINE

## 2024-05-14 RX ORDER — CYANOCOBALAMIN 1000 UG/ML
1000 INJECTION, SOLUTION INTRAMUSCULAR; SUBCUTANEOUS DAILY
Status: COMPLETED | OUTPATIENT
Start: 2024-05-14 | End: 2024-05-14

## 2024-05-14 RX ADMIN — PIPERACILLIN AND TAZOBACTAM 3375 MG: 3; .375 INJECTION, POWDER, LYOPHILIZED, FOR SOLUTION INTRAVENOUS at 20:31

## 2024-05-14 RX ADMIN — LEVOTHYROXINE SODIUM 25 MCG: 25 TABLET ORAL at 06:19

## 2024-05-14 RX ADMIN — CYANOCOBALAMIN 1000 MCG: 1000 INJECTION, SOLUTION INTRAMUSCULAR; SUBCUTANEOUS at 20:26

## 2024-05-14 RX ADMIN — PIPERACILLIN AND TAZOBACTAM 3375 MG: 3; .375 INJECTION, POWDER, LYOPHILIZED, FOR SOLUTION INTRAVENOUS at 13:59

## 2024-05-14 RX ADMIN — METOPROLOL SUCCINATE 25 MG: 25 TABLET, FILM COATED, EXTENDED RELEASE ORAL at 20:27

## 2024-05-14 RX ADMIN — GABAPENTIN 600 MG: 300 CAPSULE ORAL at 20:27

## 2024-05-14 RX ADMIN — POTASSIUM CHLORIDE 20 MEQ: 750 TABLET, EXTENDED RELEASE ORAL at 10:28

## 2024-05-14 RX ADMIN — ACETAMINOPHEN 650 MG: 325 TABLET ORAL at 20:12

## 2024-05-14 RX ADMIN — PIPERACILLIN AND TAZOBACTAM 3375 MG: 3; .375 INJECTION, POWDER, LYOPHILIZED, FOR SOLUTION INTRAVENOUS at 05:03

## 2024-05-14 RX ADMIN — METOPROLOL SUCCINATE 25 MG: 25 TABLET, FILM COATED, EXTENDED RELEASE ORAL at 10:28

## 2024-05-14 ASSESSMENT — PAIN DESCRIPTION - DESCRIPTORS: DESCRIPTORS: ACHING;DULL;SORE

## 2024-05-14 ASSESSMENT — PAIN DESCRIPTION - ORIENTATION: ORIENTATION: RIGHT;LEFT;LOWER

## 2024-05-14 ASSESSMENT — PAIN - FUNCTIONAL ASSESSMENT: PAIN_FUNCTIONAL_ASSESSMENT: ACTIVITIES ARE NOT PREVENTED

## 2024-05-14 ASSESSMENT — PAIN SCALES - GENERAL: PAINLEVEL_OUTOF10: 3

## 2024-05-14 ASSESSMENT — PAIN DESCRIPTION - LOCATION: LOCATION: BACK

## 2024-05-14 NOTE — DISCHARGE INSTRUCTIONS
CALL DR. COLTON LUND OFFICE FOR FOLLOW UP APPOINTMENT.   [phone]  [address] [place] [address], [address]  [place], [place] [address]

## 2024-05-14 NOTE — OP NOTE
[place] [address]                            OPERATIVE REPORT      PATIENT NAME: NAYELI IBARRA                : 1950  MED REC NO: [id number]                        ROOM: [facility]  ACCOUNT NO: [id number]                       ADMIT DATE: 2024  PROVIDER: Emile Soto MD      DATE OF PROCEDURE:      SURGEON:  Emile Soto MD    PROCEDURES PERFORMED:  Endoscopic retrograde cholangiopancreatography with papillotomy, stone extraction, and stent removal.    PREOPERATIVE DIAGNOSIS:  Ascending cholangitis.    POSTOPERATIVE DIAGNOSES:  Marked stone impaction in the papilla with a fistula above the papilla with bile drainage coming from it.  Massively dilated common bile duct.  Papillotomy was done and a large, more than 2 cm, stone was extracted, and a 10-Libyan 60 mm fully-covered Wallstent was placed with excellent drainage.    ANESTHESIA:  LMAC.    DESCRIPTION OF PROCEDURE:  With the patient on his left lateral decubitus position, the Olympus side-viewing video duodenoscope was introduced into the esophagus, advanced through the GE junction into the gastric body, advanced through the pylorus into duodenal bulb, second portion of duodenum, where papilla was visualized, appeared markedly bulging indicating stone impaction.  There was a fistula above the papilla with bile coming from it.   The papilla was successfully cannulated.  A guidewire was introduced and deep cannulation was obtained.  Over the guidewire, the papillotome was advanced and cholangiography showed marked common bile duct dilatation.  Pictures were taken.  Over the guidewire, the papillotome was then adjusted and an adequately-sized papillotomy was made involving the lower border of the fistula.  Half the stone was protruded out at this time, and by advancing a 9- to 12-Libyan balloon over the guidewire, exchanging it with the papillotome, balloon

## 2024-05-14 NOTE — CONSULTS
[facility]  [facility]  Consultation Note     Admit Date: 5/12/2024  3:33 PM    Reason for Consult:   cholangitis     Attending Physician:  Sondra Valladares*    HISTORY OF PRESENT ILLNESS:             The history is obtained from extensive review of available past medical records. The patient is a 74 y.o. male who is not previously known to the ID service.    He presented to [facility] on 5/12 with complaints of fever - 102, abdominal pain, nausea, and general malaise. He was previously seen in the same ED on 5/10 and was found to have gallstones in the gallbladder with dilated CBD & elevated transaminases, an MRCP was recommended. He declined treatment and wanted to be sent home to follow up as an outpatient. He does have a history of cholecystectomy. An MRCP was done at [place] on 5/12 that showed dilated CBD measuring 10mm with few 2-7mm stones. Dilated intrahepatic biliary tree. The imagining recommend ERCP which is why he was transferred to [facility] as a direct admit. He underwent ERCP on 5/13 an impacted stone was found at the papilla with a suprapapillary fistula (Leaking bile) and massively dilated CBD, papillotomy was done and a stent was placed.     Labs in the ED showed WBC 11, , AST 55. UA is clean. Transaminases are trending down post op. Blood cultures are negative here so far. I did call [facility] and ask for them to send any mircobiology over from his ER visit on 5/12 -- he was found to have Klebsiella pneumoniae in 2/2 bottles.  He has been afebrile while in patient. He has been on Zosyn and ID was asked to see in consultation.     Past Medical History:        Diagnosis Date    Arthritis     Colon cancer (HCC)     Coronary artery disease due to lipid rich plaque 05/29/2019    Dr. Linette Shannon [facility] ([facility]) F/U yearly Denies any cardiac symptoms at this time    Diabetes mellitus (HCC)     Hypercholesteremia     Hypertension     MI 
Gastroenterology Consult Note   Nikki Peck, NAEEM-NP-C with Emile Soto M.D. Consult Note        Date of Service: 5/13/2024  Reason for Consult: cholangitis, ERCP  Requesting Physician: Dr. Morales    CHIEF COMPLAINT: Fever    History Obtained From:  patient, electronic medical record    HISTORY OF PRESENT ILLNESS:       Kentrell Ybarra is a 74 y.o. male with significant past medical history of colon cancer s/p resection and colostomy reversal, diabetes-hypertension and cholecystectomy presenting to ED for fever and admitted with cholangitis.  Patient presented to [facility] with complaints of feeling unwell and fever of approximately 102.  Was there a few days prior CAT scan showing gallstones and dilated CBD however declined workup and was discharged home.  Presented back with fever, transferred to our facility for ERCP.  MRCP on 5/12/2024 at [facility]-dilated CBD measuring 10 mm with few 2 to 7 mm stones and dilated intrahepatic biliary tree.  Pt reports fever of 102, abdominal pain described as sharp and severe spasms and nausea starting a couple days ago.  Patient unable to tolerate diet.  Has history of cholecystectomy 2022, similar complaints.  Patient has history of colon cancer status post resection in May of 2023 and colostomy reversal in September 2023.  Bowel movements are every other day using laxatives and MiraLAX on occasion.  Denies melena or hematochezia.  On aspirin daily. No NSAIDS or tylenol. Admission labs potassium 3.3, BUN 30, creatinine 1.4, , AST 55, total bilirubin 4.4, hemoglobin 12.4.  Consultation for cholangitis, ERCP.  Currently, pt reports continued generalized epigastric and diffuse abdominal pain no current nausea or vomiting.  Labs today , , AST 55, total bilirubin 4.4, hemoglobin 12.4, WBC 11, BUN 30, creatinine 1.4, potassium 3.3.    Past Medical History:        Diagnosis Date    Arthritis     Colon cancer (HCC)     Coronary artery disease 
PRN  polyethylene glycol (GLYCOLAX) packet 17 g, Daily PRN  acetaminophen (TYLENOL) tablet 650 mg, Q6H PRN   Or  acetaminophen (TYLENOL) suppository 650 mg, Q6H PRN  [Held by provider] aspirin EC tablet 81 mg, Daily  potassium chloride (KLOR-CON M) extended release tablet 20 mEq, Daily  NIFEdipine (ADALAT CC) extended release tablet 30 mg, Daily  metoprolol succinate (TOPROL XL) extended release tablet 25 mg, BID  levothyroxine (SYNTHROID) tablet 25 mcg, Daily  gabapentin (NEURONTIN) capsule 600 mg, Nightly  insulin lispro (HUMALOG,ADMELOG) injection vial 0-4 Units, TID WC  insulin lispro (HUMALOG,ADMELOG) injection vial 0-4 Units, Nightly  oxyCODONE (ROXICODONE) immediate release tablet 5 mg, Q4H PRN  lactated ringers IV soln infusion, Continuous  piperacillin-tazobactam (ZOSYN) 3,375 mg in sodium chloride 0.9 % 50 mL IVPB, Q8H    0.9 % sodium chloride infusion, Continuous PRN        Review of Systems:   Pertinent items are noted in HPI.    Physical exam:   Constitutional:    Vitals: VITALS:  BP (!) 107/56   Pulse 62   Temp 97.8 °F (36.6 °C) (Oral)   Resp 16   Ht 1.753 m (5' 9\")   Wt 85.9 kg (189 lb 4.8 oz)   SpO2 96%   BMI 27.95 kg/m²   24HR INTAKE/OUTPUT:  No intake or output data in the 24 hours ending 05/13/24 1306  URINARY CATHETER OUTPUT ([place]):     DRAIN/TUBE OUTPUT:     VENT SETTINGS:     Additional Respiratory Assessments  Pulse: 62  Respirations: 16  SpO2: 96 %    General: Awake alert and cooperative  Skin: no rash, turgor wnl  Heent:  eomi, mmm  Neck: no bruits or jvd noted  Cardiovascular:  S1, S2 without m/r/g  Respiratory: CTA B without w/r/r  Abdomen: Normal bowel sounds, nondistended, tender to palpation without guarding or rebound  Ext: No edema of lower extremity edema  Psychiatric: mood and affect appropriate  Musculoskeletal:  Rom, muscular strength intact    Data:     Labs:    CBC:   Lab Results   Component Value Date/Time    WBC 11.0 05/13/2024 02:23 AM    RBC 4.40 05/13/2024 02:23 AM

## 2024-05-15 VITALS
HEIGHT: 69 IN | TEMPERATURE: 98.3 F | HEART RATE: 52 BPM | DIASTOLIC BLOOD PRESSURE: 72 MMHG | BODY MASS INDEX: 28.38 KG/M2 | SYSTOLIC BLOOD PRESSURE: 168 MMHG | RESPIRATION RATE: 16 BRPM | OXYGEN SATURATION: 97 % | WEIGHT: 191.6 LBS

## 2024-05-15 LAB
ALBUMIN SERPL-MCNC: 2.2 G/DL (ref 3.5–4.7)
ALBUMIN SERPL-MCNC: 2.7 G/DL (ref 3.5–5.2)
ALP SERPL-CCNC: 113 U/L (ref 40–129)
ALPHA1 GLOB SERPL ELPH-MCNC: 0.3 G/DL (ref 0.2–0.4)
ALPHA2 GLOB SERPL ELPH-MCNC: 0.8 G/DL (ref 0.5–1)
ALT SERPL-CCNC: 64 U/L (ref 0–40)
ANION GAP SERPL CALCULATED.3IONS-SCNC: 10 MMOL/L (ref 7–16)
AST SERPL-CCNC: 28 U/L (ref 0–39)
B-GLOBULIN SERPL ELPH-MCNC: 1.1 G/DL (ref 0.8–1.3)
BASOPHILS # BLD: 0.03 K/UL (ref 0–0.2)
BASOPHILS NFR BLD: 0 % (ref 0–2)
BILIRUB SERPL-MCNC: 2.4 MG/DL (ref 0–1.2)
BUN SERPL-MCNC: 17 MG/DL (ref 6–23)
CALCIUM SERPL-MCNC: 8 MG/DL (ref 8.6–10.2)
CHLORIDE SERPL-SCNC: 105 MMOL/L (ref 98–107)
CO2 SERPL-SCNC: 19 MMOL/L (ref 22–29)
CREAT SERPL-MCNC: 1.1 MG/DL (ref 0.7–1.2)
EOSINOPHIL # BLD: 0.3 K/UL (ref 0.05–0.5)
EOSINOPHILS RELATIVE PERCENT: 4 % (ref 0–6)
ERYTHROCYTE [DISTWIDTH] IN BLOOD BY AUTOMATED COUNT: 15 % (ref 11.5–15)
GAMMA GLOB SERPL ELPH-MCNC: 0.8 G/DL (ref 0.7–1.6)
GFR, ESTIMATED: 75 ML/MIN/1.73M2
GLUCOSE BLD-MCNC: 122 MG/DL (ref 74–99)
GLUCOSE BLD-MCNC: 132 MG/DL (ref 74–99)
GLUCOSE SERPL-MCNC: 125 MG/DL (ref 74–99)
HCT VFR BLD AUTO: 33.4 % (ref 37–54)
HGB BLD-MCNC: 10.9 G/DL (ref 12.5–16.5)
IMM GRANULOCYTES # BLD AUTO: 0.16 K/UL (ref 0–0.58)
IMM GRANULOCYTES NFR BLD: 2 % (ref 0–5)
LIPASE SERPL-CCNC: 45 U/L (ref 13–60)
LYMPHOCYTES NFR BLD: 1.36 K/UL (ref 1.5–4)
LYMPHOCYTES RELATIVE PERCENT: 19 % (ref 20–42)
MAGNESIUM SERPL-MCNC: 2 MG/DL (ref 1.6–2.6)
MCH RBC QN AUTO: 27.9 PG (ref 26–35)
MCHC RBC AUTO-ENTMCNC: 32.6 G/DL (ref 32–34.5)
MCV RBC AUTO: 85.4 FL (ref 80–99.9)
MICROORGANISM SPEC CULT: NO GROWTH
MONOCYTES NFR BLD: 0.66 K/UL (ref 0.1–0.95)
MONOCYTES NFR BLD: 9 % (ref 2–12)
NEUTROPHILS NFR BLD: 64 % (ref 43–80)
NEUTS SEG NFR BLD: 4.51 K/UL (ref 1.8–7.3)
P E INTERPRETATION, U: NORMAL
PATHOLOGIST: ABNORMAL
PATHOLOGIST: NORMAL
PLATELET # BLD AUTO: 154 K/UL (ref 130–450)
PMV BLD AUTO: 11.1 FL (ref 7–12)
POTASSIUM SERPL-SCNC: 3.7 MMOL/L (ref 3.5–5)
PROT PATTERN SERPL ELPH-IMP: ABNORMAL
PROT SERPL-MCNC: 5.2 G/DL (ref 6.4–8.3)
PROT SERPL-MCNC: 5.7 G/DL (ref 6.4–8.3)
RBC # BLD AUTO: 3.91 M/UL (ref 3.8–5.8)
SODIUM SERPL-SCNC: 134 MMOL/L (ref 132–146)
SPECIMEN DESCRIPTION: NORMAL
SPECIMEN TYPE: NORMAL
SPECIMEN TYPE: NORMAL
URINE IFX INTERP: NORMAL
WBC OTHER # BLD: 7 K/UL (ref 4.5–11.5)

## 2024-05-15 PROCEDURE — 6370000000 HC RX 637 (ALT 250 FOR IP): Performed by: INTERNAL MEDICINE

## 2024-05-15 PROCEDURE — 6370000000 HC RX 637 (ALT 250 FOR IP): Performed by: REGISTERED NURSE

## 2024-05-15 PROCEDURE — 83690 ASSAY OF LIPASE: CPT

## 2024-05-15 PROCEDURE — 99239 HOSP IP/OBS DSCHRG MGMT >30: CPT | Performed by: INTERNAL MEDICINE

## 2024-05-15 PROCEDURE — 2580000003 HC RX 258: Performed by: INTERNAL MEDICINE

## 2024-05-15 PROCEDURE — 6360000002 HC RX W HCPCS: Performed by: INTERNAL MEDICINE

## 2024-05-15 PROCEDURE — 36415 COLL VENOUS BLD VENIPUNCTURE: CPT

## 2024-05-15 PROCEDURE — 85025 COMPLETE CBC W/AUTO DIFF WBC: CPT

## 2024-05-15 PROCEDURE — 82962 GLUCOSE BLOOD TEST: CPT

## 2024-05-15 PROCEDURE — 83735 ASSAY OF MAGNESIUM: CPT

## 2024-05-15 PROCEDURE — 80053 COMPREHEN METABOLIC PANEL: CPT

## 2024-05-15 PROCEDURE — 6370000000 HC RX 637 (ALT 250 FOR IP)

## 2024-05-15 RX ORDER — SODIUM BICARBONATE 650 MG/1
650 TABLET ORAL 2 TIMES DAILY
Status: DISCONTINUED | OUTPATIENT
Start: 2024-05-15 | End: 2024-05-15 | Stop reason: HOSPADM

## 2024-05-15 RX ORDER — CEFUROXIME AXETIL 500 MG/1
500 TABLET ORAL EVERY 12 HOURS SCHEDULED
Status: DISCONTINUED | OUTPATIENT
Start: 2024-05-15 | End: 2024-05-15 | Stop reason: HOSPADM

## 2024-05-15 RX ORDER — CEFUROXIME AXETIL 500 MG/1
500 TABLET ORAL EVERY 12 HOURS SCHEDULED
Qty: 14 TABLET | Refills: 0 | Status: SHIPPED | OUTPATIENT
Start: 2024-05-15 | End: 2024-05-22

## 2024-05-15 RX ADMIN — METOPROLOL SUCCINATE 25 MG: 25 TABLET, FILM COATED, EXTENDED RELEASE ORAL at 09:32

## 2024-05-15 RX ADMIN — CEFUROXIME AXETIL 500 MG: 500 TABLET ORAL at 13:53

## 2024-05-15 RX ADMIN — PIPERACILLIN AND TAZOBACTAM 3375 MG: 3; .375 INJECTION, POWDER, LYOPHILIZED, FOR SOLUTION INTRAVENOUS at 11:25

## 2024-05-15 RX ADMIN — POTASSIUM CHLORIDE 20 MEQ: 750 TABLET, EXTENDED RELEASE ORAL at 09:31

## 2024-05-15 RX ADMIN — SODIUM CHLORIDE, PRESERVATIVE FREE 10 ML: 5 INJECTION INTRAVENOUS at 11:35

## 2024-05-15 RX ADMIN — LEVOTHYROXINE SODIUM 25 MCG: 25 TABLET ORAL at 06:48

## 2024-05-15 RX ADMIN — PIPERACILLIN AND TAZOBACTAM 3375 MG: 3; .375 INJECTION, POWDER, LYOPHILIZED, FOR SOLUTION INTRAVENOUS at 03:47

## 2024-05-15 RX ADMIN — SODIUM BICARBONATE 650 MG: 650 TABLET ORAL at 11:26

## 2024-05-15 RX ADMIN — Medication 1 TABLET: at 09:31

## 2024-05-15 NOTE — CARE COORDINATION
Social work / Discharge planning:          Social work is independent from home with his wife.   He owns a walker and cane.    No discharge needs identified at this time.  Nephrology, ID and GI following.     Electronically signed by SOL Roca on 5/15/2024 at 10:57 AM

## 2024-05-15 NOTE — DISCHARGE SUMMARY
papers, discussing discharge with patient, medication review, etc.    Signed:  Electronically signed by Sondra Valladares MD on 5/15/2024 at 2:13 PM    NOTE: This report was transcribed using voice recognition software. Every effort was made to ensure accuracy; however, inadvertent computerized transcription errors may be present.

## 2024-05-15 NOTE — PROGRESS NOTES
[facility] Hospitalist Progress Note    Admitting Date and Time: 5/12/2024  3:33 PM  Admit Dx: Cholangitis [K83.09]    Subjective:  Patient is being followed for Cholangitis [K83.09]   Pt was seen and examined.     ROS: denies fever, chills, cp, sob, n/v, HA unless stated above.      lactated ringers  800 mL IntraVENous Once    sodium chloride flush  5-40 mL IntraVENous 2 times per day    [Held by provider] enoxaparin  40 mg SubCUTAneous Daily    [Held by provider] aspirin  81 mg Oral Daily    potassium chloride  20 mEq Oral Daily    NIFEdipine  30 mg Oral Daily    metoprolol succinate  25 mg Oral BID    levothyroxine  25 mcg Oral Daily    gabapentin  600 mg Oral Nightly    insulin lispro  0-4 Units SubCUTAneous TID WC    insulin lispro  0-4 Units SubCUTAneous Nightly    piperacillin-tazobactam  3,375 mg IntraVENous Q8H     sodium chloride flush, 5-40 mL, PRN  sodium chloride, , PRN  potassium chloride, 40 mEq, PRN   Or  potassium alternative oral replacement, 40 mEq, PRN   Or  potassium chloride, 10 mEq, PRN  magnesium sulfate, 2,000 mg, PRN  ondansetron, 4 mg, Q8H PRN   Or  ondansetron, 4 mg, Q6H PRN  polyethylene glycol, 17 g, Daily PRN  acetaminophen, 650 mg, Q6H PRN   Or  acetaminophen, 650 mg, Q6H PRN  oxyCODONE, 5 mg, Q4H PRN         Objective:    BP (!) 86/48   Pulse 58   Temp 97.8 °F (36.6 °C)   Resp 18   Ht 1.753 m (5' 9\")   Wt 85.9 kg (189 lb 4.8 oz)   SpO2 95%   BMI 27.95 kg/m²     General Appearance: alert and oriented to person, place and time and in no acute distress  Skin: warm and dry  Head: normocephalic and atraumatic  Eyes: pupils equal, round, and reactive to light, extraocular eye movements intact, conjunctivae normal  Neck: neck supple and non tender without mass   Pulmonary/Chest: clear to auscultation bilaterally- no wheezes, rales or rhonchi, normal air movement, no respiratory distress  Cardiovascular: normal rate, normal S1 and S2 and no carotid bruits  Abdomen: soft, 
     [facility] Hospitalist   Progress Note    Admitting Date and Time: 5/12/2024  3:33 PM  Admit Dx: Cholangitis [K83.09]    Subjective: Admitted on 12th, presented with fever, patient with known CAD, diabetes, hypertension, hyperlipidemia, thyroid disease, from [place], diagnosed with cholangitis, MRCP showing dilated CBD, elevated lactate on presentation, elevated LFTs, seen by GI on 13th, patient with history of colon cancer, s/p resection and colostomy reversal, did have ERCP on 13th, showed impacted stone at the papula with supra papillary fistula, did have papillotomy.  Also removal of large stone and placement of wall stent.  Patient remains on IV fluids for DELORIS.  As per renal stage I DELORIS likely due to decreased effective renal perfusion.  GI has continued antibiotics, however patient okay for DC from GI side.  Impression from ID of Klebsiella pneumoniae bacteremia associated with cholangitis, patient remains on Zosyn.    Patient was admitted with Cholangitis [K83.09]. Patient feels comfortable, at this time awake, alert, sitting in bed, does communicate well, does feel a lot better, was able to take p.o., [place] for the first time in 3 days, able to tolerate, wanted to know where he can go home.    Per RN: No new complaints.    ROS: denies fever, chills, cp, sob, n/v, HA unless stated above.     sodium chloride flush  5-40 mL IntraVENous 2 times per day    [Held by provider] enoxaparin  40 mg SubCUTAneous Daily    [Held by provider] aspirin  81 mg Oral Daily    potassium chloride  20 mEq Oral Daily    [Held by provider] NIFEdipine  30 mg Oral Daily    metoprolol succinate  25 mg Oral BID    levothyroxine  25 mcg Oral Daily    gabapentin  600 mg Oral Nightly    insulin lispro  0-4 Units SubCUTAneous TID WC    insulin lispro  0-4 Units SubCUTAneous Nightly    piperacillin-tazobactam  3,375 mg IntraVENous Q8H     HYDROmorphone, 1 mg, Q4H PRN  sodium chloride flush, 5-40 mL, PRN  sodium chloride, , 
  [facility]  [facility]  Progress Note    SUBJECTIVE:  CC: Abdominal pain, fever    Patient is tolerating medications. No reported adverse drug reactions.  No nausea, vomiting, diarrhea.  No fevers or chills  Feeling well and is hoping to go home today    Review of systems:  As stated above in the chief complaint, otherwise negative.    Medications:  Scheduled Meds:   sodium bicarbonate  650 mg Oral BID    folic acid-pyridoxine-cyancobalamin 2.5-25-2 mg tablet  1 tablet Oral Daily    sodium chloride flush  5-40 mL IntraVENous 2 times per day    [Held by provider] enoxaparin  40 mg SubCUTAneous Daily    [Held by provider] aspirin  81 mg Oral Daily    potassium chloride  20 mEq Oral Daily    [Held by provider] NIFEdipine  30 mg Oral Daily    metoprolol succinate  25 mg Oral BID    levothyroxine  25 mcg Oral Daily    gabapentin  600 mg Oral Nightly    insulin lispro  0-4 Units SubCUTAneous TID WC    insulin lispro  0-4 Units SubCUTAneous Nightly    piperacillin-tazobactam  3,375 mg IntraVENous Q8H     Continuous Infusions:   sodium chloride       PRN Meds:HYDROmorphone, sodium chloride flush, sodium chloride, potassium chloride **OR** potassium alternative oral replacement **OR** potassium chloride, magnesium sulfate, ondansetron **OR** ondansetron, polyethylene glycol, acetaminophen **OR** acetaminophen, oxyCODONE    OBJECTIVE:  BP (!) 168/72   Pulse 52   Temp 98.3 °F (36.8 °C) (Oral)   Resp 16   Ht 1.753 m (5' 9\")   Wt 86.9 kg (191 lb 9.6 oz)   SpO2 97%   BMI 28.29 kg/m²   Temp  Av.3 °F (36.8 °C)  Min: 97.6 °F (36.4 °C)  Max: 99.2 °F (37.3 °C)  Constitutional: The patient is awake, alert, and oriented.   Skin: Warm and dry. No rashes were noted.   HEENT: Round and reactive pupils.  Moist mucous membranes.  No ulcerations or thrush.  Neck: Supple to movements.   Chest: No use of accessory muscles to breathe. Symmetrical expansion.  No wheezing, crackles or 
4 Eyes Skin Assessment     NAME:  Kentrell Ybarra  YOB: 1950  MEDICAL RECORD NUMBER:  [id number]    The patient is being assessed for  Admission    I agree that at least one RN has performed a thorough Head to Toe Skin Assessment on the patient. ALL assessment sites listed below have been assessed.      Areas assessed by both nurses:    Head, Face, Ears, Shoulders, Back, Chest, Arms, Elbows, Hands, Sacrum. Buttock, Coccyx, Ischium, Legs. Feet and Heels, and Under Medical Devices         Does the Patient have a Wound? No noted wound(s)       Kannan Prevention initiated by RN: Yes  Wound Care Orders initiated by RN: No    Pressure Injury (Stage 3,4, Unstageable, DTI, NWPT, and Complex wounds) if present, place Wound referral order by RN under : No    New Ostomies, if present place, Ostomy referral order under : No     Nurse 1 eSignature: Electronically signed by Virginie Tillman RN on 5/12/24 at 4:45 PM EDT    **SHARE this note so that the co-signing nurse can place an eSignature**    Nurse 2 eSignature: Electronically signed by Jane Ribeiro RN on 5/12/24 at 4:48 PM EDT  
Consult completed to Nephrology via perfect serve text message    Mercedes Fan - unit secretary  
Consult sent to Dr. Holt at this time.  Dr. Holt stated that group does not do ERCP's at this time and to switch the consult to Dr. Soto. New consult sent to Dr. Soto's answering service at this time  
NEPHROLOGY Attending   Progress Note  5/14/2024 1:34 PM  Subjective:   Admit Date: 5/12/2024  PCP: Arabella De La Cruz APRN - LEANNA    History of Present Ilness:      Kentrell Ybarra is a 74 y.o. male with prior history of Colon Cancer. Pt presented to [facility] with complaints of fever 102. He had aCt Abd and Pelvis on 5/10/24 which showed gallstones in the gallbladder with dilated CBD. He had elevated LFT's and further w/u was recommended but the pt opted to D/C home. He had an MRCP on 5/12/24 when he represented for fever. Results 5/12 0946 MRCP revealing Dilated CBD measuring 10mm with few 2-7mm stones. Dilated intrahepatic biliary tree. Recommend ERCP. His cr 1.69 and a LA 4.6 which trended down to 2.8 and 1.6. His baseline serum cr 1.0mg/dl. His cr on presentation to [facility] 1.4mg/dl.     Patient currently examined in process of being transported for ERCP.  He has been maintained NPO.  He currently denies any CP or SOB at rest.  Currently without any nausea or vomiting.  He states that abdominal pain started 2 to 3 days prior to admission.  He has been unable to tolerate any type of diet.  He has a BM approximately every other day, using laxatives.  He denies any specific urinary complaints.      Interval History:    5/14/24:  s/p ERCP with papillotomy, stone extraction, and stent removal - tolerated well.  No acute issues overnight.  He remains afebrile.  He denies any nausea or vomiting.  He is asking about going home.      Diet: ADULT DIET; Regular    Data:   Scheduled Meds:   sodium chloride flush  5-40 mL IntraVENous 2 times per day    [Held by provider] enoxaparin  40 mg SubCUTAneous Daily    [Held by provider] aspirin  81 mg Oral Daily    potassium chloride  20 mEq Oral Daily    [Held by provider] NIFEdipine  30 mg Oral Daily    metoprolol succinate  25 mg Oral BID    levothyroxine  25 mcg Oral Daily    gabapentin  600 mg Oral Nightly    insulin lispro  0-4 Units SubCUTAneous TID     insulin lispro 
NEPHROLOGY Attending   Progress Note  5/15/2024 9:29 AM  Subjective:   Admit Date: 5/12/2024  PCP: Arabella De La Cruz, NAEEM - [facility]AN[facility]    History of Present Ilness:      Kentrell Ybarra is a 74 y.o. male with prior history of Colon Cancer. Pt presented to [facility] with complaints of fever 102. He had aCt Abd and Pelvis on 5/10/24 which showed gallstones in the gallbladder with dilated CBD. He had elevated LFT's and further w/u was recommended but the pt opted to D/C home. He had an MRCP on 5/12/24 when he represented for fever. Results 5/12 0946 MRCP revealing Dilated CBD measuring 10mm with few 2-7mm stones. Dilated intrahepatic biliary tree. Recommend ERCP. His cr 1.69 and a LA 4.6 which trended down to 2.8 and 1.6. His baseline serum cr 1.0mg/dl. His cr on presentation to [facility] 1.4mg/dl.     Patient currently examined in process of being transported for ERCP.  He has been maintained NPO.  He currently denies any CP or SOB at rest.  Currently without any nausea or vomiting.  He states that abdominal pain started 2 to 3 days prior to admission.  He has been unable to tolerate any type of diet.  He has a BM approximately every other day, using laxatives.  He denies any specific urinary complaints.      Interval History:    5/15/24: He reports feeling good today. He denies any pain. He denies shortness of breath. He denies n/v. Plan to discharge home later today.       Diet: ADULT DIET; Regular    Data:   Scheduled Meds:   folic acid-pyridoxine-cyancobalamin 2.5-25-2 mg tablet  1 tablet Oral Daily    sodium chloride flush  5-40 mL IntraVENous 2 times per day    [Held by provider] enoxaparin  40 mg SubCUTAneous Daily    [Held by provider] aspirin  81 mg Oral Daily    potassium chloride  20 mEq Oral Daily    [Held by provider] NIFEdipine  30 mg Oral Daily    metoprolol succinate  25 mg Oral BID    levothyroxine  25 mcg Oral Daily    gabapentin  600 mg Oral Nightly    insulin lispro  0-4 Units SubCUTAneous TID WC    
PROGRESS NOTE        Patient Presents with/Seen in Consultation For      Reason for Consult: cholangitis, ERCP   CHIEF COMPLAINT: Fever   Subjective:     Patient seen laying in bed in no apparent distress.  ERCP reviewed with patient.  No complaints of abdominal pain, nausea or vomiting.  Wants to go home.  Plan of care discussed with patient.    Review of Systems  Aside from what was mentioned in the PMH and HPI, essentially unremarkable, all others negative.    Objective:     /63   Pulse 60   Temp 97.2 °F (36.2 °C) (Axillary)   Resp 18   Ht 1.753 m (5' 9\")   Wt 85.9 kg (189 lb 6 oz)   SpO2 94%   BMI 27.97 kg/m²     General appearance: alert, awake, laying in bed, and cooperative  Eyes: conjunctiva pale, sclera anicteric. PERRL.  Lungs: clear to auscultation bilaterally  Heart: regular rate and rhythm, no murmur, 2+ pulses;no edema  Abdomen: soft, non-tender; bowel sounds normal; no masses,  no organomegaly  Extremities: extremities without edema  Pulses: 2+ and symmetric  Skin: Skin color, texture, turgor normal.   Neurologic: Grossly normal    HYDROmorphone (DILAUDID) injection 1 mg, Q4H PRN  sodium chloride flush 0.9 % injection 5-40 mL, 2 times per day  sodium chloride flush 0.9 % injection 5-40 mL, PRN  0.9 % sodium chloride infusion, PRN  potassium chloride (KLOR-CON M) extended release tablet 40 mEq, PRN   Or  potassium bicarb-citric acid (EFFER-K) effervescent tablet 40 mEq, PRN   Or  potassium chloride 10 mEq/100 mL IVPB (Peripheral Line), PRN  magnesium sulfate 2000 mg in 50 mL IVPB premix, PRN  [Held by provider] enoxaparin (LOVENOX) injection 40 mg, Daily  ondansetron (ZOFRAN-ODT) disintegrating tablet 4 mg, Q8H PRN   Or  ondansetron (ZOFRAN) injection 4 mg, Q6H PRN  polyethylene glycol (GLYCOLAX) packet 17 g, Daily PRN  acetaminophen (TYLENOL) tablet 650 mg, Q6H PRN   Or  acetaminophen (TYLENOL) suppository 650 mg, Q6H PRN  [Held by provider] aspirin EC tablet 81 mg, Daily  potassium 
Q6H PRN  polyethylene glycol (GLYCOLAX) packet 17 g, Daily PRN  acetaminophen (TYLENOL) tablet 650 mg, Q6H PRN   Or  acetaminophen (TYLENOL) suppository 650 mg, Q6H PRN  [Held by provider] aspirin EC tablet 81 mg, Daily  potassium chloride (KLOR-CON M) extended release tablet 20 mEq, Daily  [Held by provider] NIFEdipine (ADALAT CC) extended release tablet 30 mg, Daily  metoprolol succinate (TOPROL XL) extended release tablet 25 mg, BID  levothyroxine (SYNTHROID) tablet 25 mcg, Daily  gabapentin (NEURONTIN) capsule 600 mg, Nightly  insulin lispro (HUMALOG,ADMELOG) injection vial 0-4 Units, TID WC  insulin lispro (HUMALOG,ADMELOG) injection vial 0-4 Units, Nightly  oxyCODONE (ROXICODONE) immediate release tablet 5 mg, Q4H PRN  piperacillin-tazobactam (ZOSYN) 3,375 mg in sodium chloride 0.9 % 50 mL IVPB, Q8H         Data Review  CBC:   Lab Results   Component Value Date/Time    WBC 7.0 05/15/2024 06:47 AM    RBC 3.91 05/15/2024 06:47 AM    HGB 10.9 05/15/2024 06:47 AM    HCT 33.4 05/15/2024 06:47 AM    MCV 85.4 05/15/2024 06:47 AM    MCH 27.9 05/15/2024 06:47 AM    MCHC 32.6 05/15/2024 06:47 AM    RDW 15.0 05/15/2024 06:47 AM     05/15/2024 06:47 AM    MPV 11.1 05/15/2024 06:47 AM     CMP:    Lab Results   Component Value Date/Time     05/15/2024 06:47 AM    K 3.7 05/15/2024 06:47 AM    K 3.5 09/29/2021 01:20 AM     05/15/2024 06:47 AM    CO2 19 05/15/2024 06:47 AM    BUN 17 05/15/2024 06:47 AM    CREATININE 1.1 05/15/2024 06:47 AM    GFRAA 43 09/29/2021 07:15 PM    LABGLOM 75 05/15/2024 06:47 AM    LABGLOM >60 09/11/2023 05:45 PM    GLUCOSE 125 05/15/2024 06:47 AM    CALCIUM 8.0 05/15/2024 06:47 AM    BILITOT 2.4 05/15/2024 06:47 AM    ALKPHOS 113 05/15/2024 06:47 AM    AST 28 05/15/2024 06:47 AM    ALT 64 05/15/2024 06:47 AM     Hepatic Function Panel:    Lab Results   Component Value Date/Time    ALKPHOS 113 05/15/2024 06:47 AM    ALT 64 05/15/2024 06:47 AM    AST 28 05/15/2024 06:47 AM    BILITOT

## 2024-05-15 NOTE — PLAN OF CARE
Problem: Discharge Planning  Goal: Discharge to home or other facility with appropriate resources  Outcome: Progressing     Problem: Safety - Adult  Goal: Free from fall injury  Outcome: Progressing     Problem: Gastrointestinal - Adult  Goal: Minimal or absence of nausea and vomiting  Outcome: Progressing  Goal: Maintains or returns to baseline bowel function  Outcome: Progressing  Goal: Maintains adequate nutritional intake  Outcome: Progressing     Problem: Chronic Conditions and Co-morbidities  Goal: Patient's chronic conditions and co-morbidity symptoms are monitored and maintained or improved  Outcome: Progressing     Problem: Metabolic/Fluid and Electrolytes - Adult  Goal: Electrolytes maintained within normal limits  Outcome: Progressing  Goal: Hemodynamic stability and optimal renal function maintained  Outcome: Progressing  Goal: Glucose maintained within prescribed range  Outcome: Progressing     Problem: Discharge Planning  Goal: Discharge to home or other facility with appropriate resources  Outcome: Progressing     Problem: Safety - Adult  Goal: Free from fall injury  Outcome: Progressing     Problem: Gastrointestinal - Adult  Goal: Minimal or absence of nausea and vomiting  Outcome: Progressing     Problem: Gastrointestinal - Adult  Goal: Maintains or returns to baseline bowel function  Outcome: Progressing     Problem: Gastrointestinal - Adult  Goal: Maintains adequate nutritional intake  Outcome: Progressing     Problem: Chronic Conditions and Co-morbidities  Goal: Patient's chronic conditions and co-morbidity symptoms are monitored and maintained or improved  Outcome: Progressing     Problem: Metabolic/Fluid and Electrolytes - Adult  Goal: Electrolytes maintained within normal limits  Outcome: Progressing     Problem: Metabolic/Fluid and Electrolytes - Adult  Goal: Hemodynamic stability and optimal renal function maintained  Outcome: Progressing     Problem: Metabolic/Fluid and Electrolytes -

## 2024-05-18 LAB
ACB COMPLEX DNA BLD POS QL NAA+NON-PROBE: NOT DETECTED
B FRAGILIS DNA BLD POS QL NAA+NON-PROBE: NOT DETECTED
BIOFIRE TEST COMMENT: ABNORMAL
BLACTX-M ISLT/SPM QL: NOT DETECTED
BLAIMP ISLT/SPM QL: NOT DETECTED
BLAKPC ISLT/SPM QL: NOT DETECTED
BLAOXA-48-LIKE ISLT/SPM QL: NOT DETECTED
BLAVIM ISLT/SPM QL: NOT DETECTED
C ALBICANS DNA BLD POS QL NAA+NON-PROBE: NOT DETECTED
C AURIS DNA BLD POS QL NAA+NON-PROBE: NOT DETECTED
C GATTII+NEOFOR DNA BLD POS QL NAA+N-PRB: NOT DETECTED
C GLABRATA DNA BLD POS QL NAA+NON-PROBE: NOT DETECTED
C KRUSEI DNA BLD POS QL NAA+NON-PROBE: NOT DETECTED
C PARAP DNA BLD POS QL NAA+NON-PROBE: NOT DETECTED
C TROPICLS DNA BLD POS QL NAA+NON-PROBE: NOT DETECTED
COLISTIN RES MCR-1 ISLT/SPM QL: NOT DETECTED
E CLOAC COMP DNA BLD POS NAA+NON-PROBE: NOT DETECTED
E COLI DNA BLD POS QL NAA+NON-PROBE: NOT DETECTED
E FAECALIS DNA BLD POS QL NAA+NON-PROBE: NOT DETECTED
E FAECIUM DNA BLD POS QL NAA+NON-PROBE: NOT DETECTED
ENTEROBACTERALES DNA BLD POS NAA+N-PRB: DETECTED
GP B STREP DNA BLD POS QL NAA+NON-PROBE: NOT DETECTED
HAEM INFLU DNA BLD POS QL NAA+NON-PROBE: NOT DETECTED
K OXYTOCA DNA BLD POS QL NAA+NON-PROBE: NOT DETECTED
KLEBSIELLA SP DNA BLD POS QL NAA+NON-PRB: DETECTED
KLEBSIELLA SP DNA BLD POS QL NAA+NON-PRB: NOT DETECTED
L MONOCYTOG DNA BLD POS QL NAA+NON-PROBE: NOT DETECTED
MICROORGANISM SPEC CULT: ABNORMAL
MICROORGANISM/AGENT SPEC: ABNORMAL
N MEN DNA BLD POS QL NAA+NON-PROBE: NOT DETECTED
P AERUGINOSA DNA BLD POS NAA+NON-PROBE: NOT DETECTED
PROTEUS SP DNA BLD POS QL NAA+NON-PROBE: NOT DETECTED
RESISTANT GENE NDM BY PCR: NOT DETECTED
S AUREUS DNA BLD POS QL NAA+NON-PROBE: NOT DETECTED
S AUREUS+CONS DNA BLD POS NAA+NON-PROBE: NOT DETECTED
S EPIDERMIDIS DNA BLD POS QL NAA+NON-PRB: NOT DETECTED
S LUGDUNENSIS DNA BLD POS QL NAA+NON-PRB: NOT DETECTED
S MALTOPHILIA DNA BLD POS QL NAA+NON-PRB: NOT DETECTED
S MARCESCENS DNA BLD POS NAA+NON-PROBE: NOT DETECTED
S PNEUM DNA BLD POS QL NAA+NON-PROBE: NOT DETECTED
S PYO DNA BLD POS QL NAA+NON-PROBE: NOT DETECTED
SALMONELLA DNA BLD POS QL NAA+NON-PROBE: NOT DETECTED
SERVICE CMNT-IMP: ABNORMAL
SPECIMEN DESCRIPTION: ABNORMAL
STREPTOCOCCUS DNA BLD POS NAA+NON-PROBE: NOT DETECTED

## 2024-05-19 LAB
MICROORGANISM SPEC CULT: ABNORMAL
MICROORGANISM/AGENT SPEC: ABNORMAL
SERVICE CMNT-IMP: ABNORMAL
SPECIMEN DESCRIPTION: ABNORMAL

## 2024-07-04 ENCOUNTER — HOSPITAL ENCOUNTER (OUTPATIENT)
Age: 74
Discharge: HOME | End: 2024-07-06

## 2024-07-04 LAB
ALBUMIN SERPL-MCNC: 3.7 G/DL (ref 3.5–5.2)
ALP SERPL-CCNC: 67 U/L (ref 40–129)
ALT SERPL-CCNC: 26 U/L (ref 0–40)
ANION GAP SERPL CALCULATED.3IONS-SCNC: 11 MMOL/L (ref 7–16)
AST SERPL-CCNC: 25 U/L (ref 0–39)
BASOPHILS # BLD: 0.03 K/UL (ref 0–0.2)
BASOPHILS NFR BLD: 0 % (ref 0–2)
BILIRUB SERPL-MCNC: 0.6 MG/DL (ref 0–1.2)
BUN SERPL-MCNC: 13 MG/DL (ref 6–23)
CALCIUM SERPL-MCNC: 8.4 MG/DL (ref 8.6–10.2)
CHLORIDE SERPL-SCNC: 107 MMOL/L (ref 98–107)
CO2 SERPL-SCNC: 23 MMOL/L (ref 22–29)
CREAT SERPL-MCNC: 1.1 MG/DL (ref 0.7–1.2)
EOSINOPHIL # BLD: 0.17 K/UL (ref 0.05–0.5)
EOSINOPHILS RELATIVE PERCENT: 2 % (ref 0–6)
ERYTHROCYTE [DISTWIDTH] IN BLOOD BY AUTOMATED COUNT: 13.5 % (ref 11.5–15)
GFR, ESTIMATED: 68 ML/MIN/1.73M2
GLUCOSE SERPL-MCNC: 87 MG/DL (ref 74–99)
HCT VFR BLD AUTO: 36.4 % (ref 37–54)
HGB BLD-MCNC: 12 G/DL (ref 12.5–16.5)
IMM GRANULOCYTES # BLD AUTO: 0.03 K/UL (ref 0–0.58)
IMM GRANULOCYTES NFR BLD: 0 % (ref 0–5)
LIPASE SERPL-CCNC: 58 U/L (ref 13–60)
LYMPHOCYTES NFR BLD: 2.18 K/UL (ref 1.5–4)
LYMPHOCYTES RELATIVE PERCENT: 29 % (ref 20–42)
MCH RBC QN AUTO: 27.9 PG (ref 26–35)
MCHC RBC AUTO-ENTMCNC: 33 G/DL (ref 32–34.5)
MCV RBC AUTO: 84.7 FL (ref 80–99.9)
MONOCYTES NFR BLD: 0.55 K/UL (ref 0.1–0.95)
MONOCYTES NFR BLD: 7 % (ref 2–12)
NEUTROPHILS NFR BLD: 60 % (ref 43–80)
NEUTS SEG NFR BLD: 4.52 K/UL (ref 1.8–7.3)
PLATELET # BLD AUTO: 268 K/UL (ref 130–450)
PMV BLD AUTO: 10.2 FL (ref 7–12)
POTASSIUM SERPL-SCNC: 3.7 MMOL/L (ref 3.5–5)
PROT SERPL-MCNC: 6.5 G/DL (ref 6.4–8.3)
RBC # BLD AUTO: 4.3 M/UL (ref 3.8–5.8)
SODIUM SERPL-SCNC: 141 MMOL/L (ref 132–146)
WBC OTHER # BLD: 7.5 K/UL (ref 4.5–11.5)

## 2024-07-04 PROCEDURE — 85025 COMPLETE CBC W/AUTO DIFF WBC: CPT

## 2024-07-04 PROCEDURE — 80053 COMPREHEN METABOLIC PANEL: CPT

## 2024-07-04 PROCEDURE — 83690 ASSAY OF LIPASE: CPT

## 2024-09-18 RX ORDER — FUROSEMIDE 20 MG
1 TABLET ORAL DAILY
COMMUNITY
Start: 2023-03-24

## 2024-09-23 ENCOUNTER — ANESTHESIA (OUTPATIENT)
Dept: ENDOSCOPY | Age: 74
End: 2024-09-23
Payer: OTHER GOVERNMENT

## 2024-09-23 ENCOUNTER — ANESTHESIA EVENT (OUTPATIENT)
Dept: ENDOSCOPY | Age: 74
End: 2024-09-23
Payer: OTHER GOVERNMENT

## 2024-09-23 ENCOUNTER — HOSPITAL ENCOUNTER (OUTPATIENT)
Dept: GENERAL RADIOLOGY | Age: 74
Discharge: HOME | End: 2024-09-25
Attending: INTERNAL MEDICINE
Payer: OTHER GOVERNMENT

## 2024-09-23 ENCOUNTER — HOSPITAL ENCOUNTER (OUTPATIENT)
Age: 74
Setting detail: OUTPATIENT SURGERY
Discharge: HOME | End: 2024-09-23
Attending: INTERNAL MEDICINE | Admitting: INTERNAL MEDICINE
Payer: OTHER GOVERNMENT

## 2024-09-23 ENCOUNTER — PREP FOR PROCEDURE (OUTPATIENT)
Dept: GASTROENTEROLOGY | Age: 74
End: 2024-09-23

## 2024-09-23 ENCOUNTER — APPOINTMENT (OUTPATIENT)
Dept: GENERAL RADIOLOGY | Age: 74
End: 2024-09-23
Attending: INTERNAL MEDICINE
Payer: OTHER GOVERNMENT

## 2024-09-23 VITALS
DIASTOLIC BLOOD PRESSURE: 65 MMHG | HEART RATE: 47 BPM | BODY MASS INDEX: 26.66 KG/M2 | HEIGHT: 69 IN | RESPIRATION RATE: 16 BRPM | WEIGHT: 180 LBS | OXYGEN SATURATION: 96 % | SYSTOLIC BLOOD PRESSURE: 136 MMHG

## 2024-09-23 PROCEDURE — 3700000001 HC ADD 15 MINUTES (ANESTHESIA): Performed by: INTERNAL MEDICINE

## 2024-09-23 PROCEDURE — 6360000004 HC RX CONTRAST MEDICATION: Performed by: INTERNAL MEDICINE

## 2024-09-23 PROCEDURE — C1769 GUIDE WIRE: HCPCS | Performed by: INTERNAL MEDICINE

## 2024-09-23 PROCEDURE — 2709999900 HC NON-CHARGEABLE SUPPLY: Performed by: INTERNAL MEDICINE

## 2024-09-23 PROCEDURE — 3700000000 HC ANESTHESIA ATTENDED CARE: Performed by: INTERNAL MEDICINE

## 2024-09-23 PROCEDURE — 6360000002 HC RX W HCPCS

## 2024-09-23 PROCEDURE — 2720000010 HC SURG SUPPLY STERILE: Performed by: INTERNAL MEDICINE

## 2024-09-23 PROCEDURE — 7100000011 HC PHASE II RECOVERY - ADDTL 15 MIN: Performed by: INTERNAL MEDICINE

## 2024-09-23 PROCEDURE — 3609015000 HC ERCP REMOVE FOREIGN BODY/STENT BILIARY/PANC DUCT: Performed by: INTERNAL MEDICINE

## 2024-09-23 PROCEDURE — 2580000003 HC RX 258: Performed by: INTERNAL MEDICINE

## 2024-09-23 PROCEDURE — 7100000010 HC PHASE II RECOVERY - FIRST 15 MIN: Performed by: INTERNAL MEDICINE

## 2024-09-23 PROCEDURE — 74330 X-RAY BILE/PANC ENDOSCOPY: CPT

## 2024-09-23 RX ORDER — SODIUM CHLORIDE 9 MG/ML
25 INJECTION, SOLUTION INTRAVENOUS PRN
Status: CANCELLED | OUTPATIENT
Start: 2024-09-23

## 2024-09-23 RX ORDER — SODIUM CHLORIDE 0.9 % (FLUSH) 0.9 %
5-40 SYRINGE (ML) INJECTION PRN
Status: CANCELLED | OUTPATIENT
Start: 2024-09-23

## 2024-09-23 RX ORDER — SODIUM CHLORIDE 0.9 % (FLUSH) 0.9 %
5-40 SYRINGE (ML) INJECTION EVERY 12 HOURS SCHEDULED
Status: DISCONTINUED | OUTPATIENT
Start: 2024-09-23 | End: 2024-09-23 | Stop reason: HOSPADM

## 2024-09-23 RX ORDER — SODIUM CHLORIDE 9 MG/ML
INJECTION, SOLUTION INTRAVENOUS CONTINUOUS
Status: CANCELLED | OUTPATIENT
Start: 2024-09-23

## 2024-09-23 RX ORDER — SODIUM CHLORIDE 9 MG/ML
25 INJECTION, SOLUTION INTRAVENOUS PRN
Status: DISCONTINUED | OUTPATIENT
Start: 2024-09-23 | End: 2024-09-23 | Stop reason: HOSPADM

## 2024-09-23 RX ORDER — GLYCOPYRROLATE 0.2 MG/ML
INJECTION INTRAMUSCULAR; INTRAVENOUS
Status: DISCONTINUED | OUTPATIENT
Start: 2024-09-23 | End: 2024-09-23 | Stop reason: SDUPTHER

## 2024-09-23 RX ORDER — SODIUM CHLORIDE 0.9 % (FLUSH) 0.9 %
5-40 SYRINGE (ML) INJECTION PRN
Status: DISCONTINUED | OUTPATIENT
Start: 2024-09-23 | End: 2024-09-23 | Stop reason: HOSPADM

## 2024-09-23 RX ORDER — ONDANSETRON 2 MG/ML
INJECTION INTRAMUSCULAR; INTRAVENOUS
Status: DISCONTINUED | OUTPATIENT
Start: 2024-09-23 | End: 2024-09-23 | Stop reason: SDUPTHER

## 2024-09-23 RX ORDER — IOPAMIDOL 612 MG/ML
18 INJECTION, SOLUTION INTRAVASCULAR
Status: COMPLETED | OUTPATIENT
Start: 2024-09-23 | End: 2024-09-23

## 2024-09-23 RX ORDER — PROPOFOL 10 MG/ML
INJECTION, EMULSION INTRAVENOUS
Status: DISCONTINUED | OUTPATIENT
Start: 2024-09-23 | End: 2024-09-23 | Stop reason: SDUPTHER

## 2024-09-23 RX ORDER — SODIUM CHLORIDE 0.9 % (FLUSH) 0.9 %
5-40 SYRINGE (ML) INJECTION EVERY 12 HOURS SCHEDULED
Status: CANCELLED | OUTPATIENT
Start: 2024-09-23

## 2024-09-23 RX ORDER — SODIUM CHLORIDE 9 MG/ML
INJECTION, SOLUTION INTRAVENOUS CONTINUOUS
Status: DISCONTINUED | OUTPATIENT
Start: 2024-09-23 | End: 2024-09-23 | Stop reason: HOSPADM

## 2024-09-23 RX ADMIN — IOPAMIDOL 18 ML: 612 INJECTION, SOLUTION INTRAVENOUS at 13:51

## 2024-09-23 RX ADMIN — ONDANSETRON 4 MG: 2 INJECTION INTRAMUSCULAR; INTRAVENOUS at 13:13

## 2024-09-23 RX ADMIN — SODIUM CHLORIDE: 9 INJECTION, SOLUTION INTRAVENOUS at 13:13

## 2024-09-23 RX ADMIN — GLYCOPYRROLATE 0.2 MG: 0.2 INJECTION, SOLUTION INTRAMUSCULAR; INTRAVENOUS at 13:13

## 2024-09-23 RX ADMIN — PROPOFOL 320 MG: 10 INJECTION, EMULSION INTRAVENOUS at 13:13

## 2024-09-23 ASSESSMENT — LIFESTYLE VARIABLES: SMOKING_STATUS: 0

## 2024-09-23 NOTE — H&P
Gastroenterology      Pre-operative History and Physical      HISTORY OF PRESENT ILLNESS:      Kentrell Ybarra is a 74 y.o. male with significant past medical history of colon cancer s/p resection and colostomy reversal, diabetes-hypertension and cholecystectomy presenting to ED for fever and admitted with cholangitis.  Patient presented to [facility] with complaints of feeling unwell and fever of approximately 102.  Was there a few days prior CAT scan showing gallstones and dilated CBD however declined workup and was discharged home.  Presented back with fever, transferred to our facility for ERCP.  MRCP on 5/12/2024 at [facility]-dilated CBD measuring 10 mm with few 2 to 7 mm stones and dilated intrahepatic biliary tree.  Pt reports fever of 102, abdominal pain described as sharp and severe spasms and nausea starting a couple days ago.  Patient unable to tolerate diet.  Has history of cholecystectomy 2022, similar complaints.  Patient has history of colon cancer status post resection in May of 2023 and colostomy reversal in September 2023.  Bowel movements are every other day using laxatives and MiraLAX on occasion.  Denies melena or hematochezia.  On aspirin daily. No NSAIDS or tylenol. Admission labs potassium 3.3, BUN 30, creatinine 1.4, , AST 55, total bilirubin 4.4, hemoglobin 12.4.  Consultation for cholangitis, ERCP.  Currently, pt reports continued generalized epigastric and diffuse abdominal pain no current nausea or vomiting.  Labs today , , AST 55, total bilirubin 4.4, hemoglobin 12.4, WBC 11, BUN 30, creatinine 1.4, potassium 3.3. ERCP on 05/12/2024. Here today for ERCP with stent removal.    HISTORY:   Past Medical History:   Diagnosis Date    Arthritis     Colon cancer (HCC)     Coronary artery disease due to lipid rich plaque 05/29/2019    Dr. Linette Shannon [facility] F/U yearly Denies any cardiac symptoms at this time    Diabetes mellitus (HCC)

## 2024-12-28 ENCOUNTER — APPOINTMENT (OUTPATIENT)
Dept: CT IMAGING | Age: 74
End: 2024-12-28
Payer: OTHER GOVERNMENT

## 2024-12-28 ENCOUNTER — HOSPITAL ENCOUNTER (EMERGENCY)
Age: 74
Discharge: HOME | End: 2024-12-28
Payer: OTHER GOVERNMENT

## 2024-12-28 ENCOUNTER — APPOINTMENT (OUTPATIENT)
Dept: GENERAL RADIOLOGY | Age: 74
End: 2024-12-28
Payer: OTHER GOVERNMENT

## 2024-12-28 VITALS
WEIGHT: 180 LBS | HEIGHT: 69 IN | TEMPERATURE: 98.6 F | RESPIRATION RATE: 16 BRPM | BODY MASS INDEX: 26.66 KG/M2 | SYSTOLIC BLOOD PRESSURE: 131 MMHG | HEART RATE: 59 BPM | DIASTOLIC BLOOD PRESSURE: 89 MMHG | OXYGEN SATURATION: 97 %

## 2024-12-28 DIAGNOSIS — S00.81XA FACIAL ABRASION, INITIAL ENCOUNTER: ICD-10-CM

## 2024-12-28 DIAGNOSIS — S43.402A SPRAIN OF LEFT SHOULDER, UNSPECIFIED SHOULDER SPRAIN TYPE, INITIAL ENCOUNTER: ICD-10-CM

## 2024-12-28 DIAGNOSIS — S00.83XA CONTUSION OF FACE, INITIAL ENCOUNTER: ICD-10-CM

## 2024-12-28 DIAGNOSIS — S09.90XA INJURY OF HEAD, INITIAL ENCOUNTER: Primary | ICD-10-CM
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
## 2024-12-28 PROCEDURE — 99284 EMERGENCY DEPT VISIT MOD MDM: CPT

## 2024-12-28 PROCEDURE — 70486 CT MAXILLOFACIAL W/O DYE: CPT

## 2024-12-28 PROCEDURE — 72125 CT NECK SPINE W/O DYE: CPT

## 2024-12-28 PROCEDURE — 70450 CT HEAD/BRAIN W/O DYE: CPT

## 2024-12-28 PROCEDURE — 6370000000 HC RX 637 (ALT 250 FOR IP): Performed by: PHYSICIAN ASSISTANT

## 2024-12-28 PROCEDURE — 71250 CT THORAX DX C-: CPT

## 2024-12-28 PROCEDURE — 73030 X-RAY EXAM OF SHOULDER: CPT

## 2024-12-28 RX ORDER — ACETAMINOPHEN 500 MG
1000 TABLET ORAL ONCE
Status: COMPLETED | OUTPATIENT
Start: 2024-12-28 | End: 2024-12-28

## 2024-12-28 RX ADMIN — ACETAMINOPHEN 1000 MG: 500 TABLET ORAL at 16:56

## 2024-12-28 ASSESSMENT — LIFESTYLE VARIABLES
HOW MANY STANDARD DRINKS CONTAINING ALCOHOL DO YOU HAVE ON A TYPICAL DAY: PATIENT DOES NOT DRINK
HOW OFTEN DO YOU HAVE A DRINK CONTAINING ALCOHOL: NEVER

## 2024-12-28 ASSESSMENT — PAIN - FUNCTIONAL ASSESSMENT
PAIN_FUNCTIONAL_ASSESSMENT: 0-10
PAIN_FUNCTIONAL_ASSESSMENT: 0-10

## 2024-12-28 ASSESSMENT — PAIN SCALES - GENERAL
PAINLEVEL_OUTOF10: 8
PAINLEVEL_OUTOF10: 8

## 2024-12-28 ASSESSMENT — PAIN DESCRIPTION - ORIENTATION: ORIENTATION: LEFT

## 2024-12-28 ASSESSMENT — PAIN DESCRIPTION - LOCATION: LOCATION: HEAD;SHOULDER

## 2024-12-28 NOTE — ED PROVIDER NOTES
Independent TYRON Visit.           [facility] EMERGENCY DEPARTMENT  ED  Encounter Note  Admit Date/RoomTime: 2024  6:14 PM  ED Room: DISPO/2  NAME: Kentrell Ybarra  : 1950  MRN: [id number]  PCP: Arabella De La Cruz, APRN - CNP    CHIEF COMPLAINT     Fall (Tripped over something on his porch), Shoulder Injury (Left shoulder pain), and Head Injury (Left face swelling takes an ASA)    HISTORY OF PRESENT ILLNESS        Kentrell Ybarra is a 74 y.o. male who presents to the ED by private vehicle for trip and fall, beginning just prior to arrival. The complaint has been persistent and are moderate in severity.  The patient states that he tripped and fell on his own front porch.  He fell forward and then directly onto his left shoulder.  Reports pain in the proximal left humerus.  The patient did hit his head he has bleeding and bruising over the left maxilla.  States that he did not pass out or lose consciousness.  He takes an aspirin daily but no other blood thinners.  The patient is also having some left-sided chest pain.   He is able to ambulate.        REVIEW OF SYSTEMS     Pertinent positives and negatives are stated within HPI, all other systems reviewed and are negative.    Past Medical History:  has a past medical history of Arthritis, Colon cancer (HCC), Coronary artery disease due to lipid rich plaque, Diabetes mellitus (HCC), Hypercholesteremia, Hypertension, MI (myocardial infarction) (HCC), Retention of urine, Squamous cell carcinoma, and Thyroid disease.  Surgical History:  has a past surgical history that includes shoulder surgery; Carpal tunnel release; Tonsillectomy; Hemorrhoid surgery; Cosmetic surgery; fracture surgery; fracture surgery; skin biopsy; Coronary angioplasty with stent (2019); Cardiac surgery; Cholecystectomy, laparoscopic (N/A, 2021); Lithotripsy (Left, 2021); TURP (N/A, 2021); Appendectomy; ERCP (N/A, 2024); and ERCP

## 2025-03-06 ENCOUNTER — OFFICE VISIT (OUTPATIENT)
Dept: ORTHOPEDIC SURGERY | Age: 75
End: 2025-03-06

## 2025-03-06 VITALS
RESPIRATION RATE: 16 BRPM | OXYGEN SATURATION: 94 % | SYSTOLIC BLOOD PRESSURE: 145 MMHG | TEMPERATURE: 98.1 F | DIASTOLIC BLOOD PRESSURE: 80 MMHG | HEART RATE: 58 BPM

## 2025-03-06 DIAGNOSIS — S46.012A TRAUMATIC COMPLETE TEAR OF LEFT ROTATOR CUFF, INITIAL ENCOUNTER: ICD-10-CM

## 2025-03-06 DIAGNOSIS — M19.212 SECONDARY OSTEOARTHRITIS OF LEFT SHOULDER DUE TO ROTATOR CUFF TEAR: Primary | ICD-10-CM

## 2025-03-06 DIAGNOSIS — M75.102 SECONDARY OSTEOARTHRITIS OF LEFT SHOULDER DUE TO ROTATOR CUFF TEAR: Primary | ICD-10-CM

## 2025-03-06 NOTE — PROGRESS NOTES
revealed significant weakness with Rajinder's testing and discomfort with Guevara/cross body maneuvers. X-rays reviewed today showed no acute fractures or dislocations but indicated superior migration of the humeral head into the acromiohumeral interval. An MRI has been ordered to further evaluate the extent of the injury and to determine if arthroscopic repair is feasible. He has been advised to contact the office upon completion of the MRI to discuss the results and subsequent treatment plan. If the MRI reveals that the tear is too extensive, retracted, or if there is significant muscle atrophy or arthritis, a shoulder replacement procedure may be considered. In such a case, a referral to Dr. Hinds or Dr. Kidd will be made for further evaluation and management.    PROCEDURE  The patient underwent surgical intervention for a right shoulder injury in 2009.          Raul Molina DO   Orthopaedic Surgery   3/6/25  12:03 PM

## 2025-03-28 ENCOUNTER — HOSPITAL ENCOUNTER (OUTPATIENT)
Dept: MRI IMAGING | Age: 75
Discharge: HOME | End: 2025-03-30
Attending: STUDENT IN AN ORGANIZED HEALTH CARE EDUCATION/TRAINING PROGRAM
Payer: OTHER GOVERNMENT

## 2025-03-28 DIAGNOSIS — M75.102 SECONDARY OSTEOARTHRITIS OF LEFT SHOULDER DUE TO ROTATOR CUFF TEAR: ICD-10-CM

## 2025-03-28 DIAGNOSIS — S46.012A TRAUMATIC COMPLETE TEAR OF LEFT ROTATOR CUFF, INITIAL ENCOUNTER: ICD-10-CM

## 2025-03-28 DIAGNOSIS — M19.212 SECONDARY OSTEOARTHRITIS OF LEFT SHOULDER DUE TO ROTATOR CUFF TEAR: ICD-10-CM

## 2025-03-28 PROCEDURE — 73221 MRI JOINT UPR EXTREM W/O DYE: CPT

## 2025-04-08 ENCOUNTER — OFFICE VISIT (OUTPATIENT)
Dept: ORTHOPEDIC SURGERY | Age: 75
End: 2025-04-08
Payer: OTHER GOVERNMENT

## 2025-04-08 VITALS
SYSTOLIC BLOOD PRESSURE: 138 MMHG | BODY MASS INDEX: 27.7 KG/M2 | HEIGHT: 69 IN | OXYGEN SATURATION: 97 % | WEIGHT: 187 LBS | DIASTOLIC BLOOD PRESSURE: 78 MMHG | HEART RATE: 58 BPM

## 2025-04-08 DIAGNOSIS — M75.102 SECONDARY OSTEOARTHRITIS OF LEFT SHOULDER DUE TO ROTATOR CUFF TEAR: Primary | ICD-10-CM

## 2025-04-08 DIAGNOSIS — M12.812 ROTATOR CUFF ARTHROPATHY OF LEFT SHOULDER: ICD-10-CM

## 2025-04-08 DIAGNOSIS — M19.212 SECONDARY OSTEOARTHRITIS OF LEFT SHOULDER DUE TO ROTATOR CUFF TEAR: Primary | ICD-10-CM

## 2025-04-08 PROCEDURE — 1123F ACP DISCUSS/DSCN MKR DOCD: CPT | Performed by: STUDENT IN AN ORGANIZED HEALTH CARE EDUCATION/TRAINING PROGRAM

## 2025-04-08 PROCEDURE — 99213 OFFICE O/P EST LOW 20 MIN: CPT | Performed by: STUDENT IN AN ORGANIZED HEALTH CARE EDUCATION/TRAINING PROGRAM

## 2025-04-08 PROCEDURE — 3078F DIAST BP <80 MM HG: CPT | Performed by: STUDENT IN AN ORGANIZED HEALTH CARE EDUCATION/TRAINING PROGRAM

## 2025-04-08 PROCEDURE — 3075F SYST BP GE 130 - 139MM HG: CPT | Performed by: STUDENT IN AN ORGANIZED HEALTH CARE EDUCATION/TRAINING PROGRAM

## 2025-04-08 NOTE — PROGRESS NOTES
a reverse total shoulder arthroplasty. Without intervention, shoulder function will continue to deteriorate over time. A referral will be made to Dr. Levin  for further evaluation and potential surgical intervention.    Follow-up: Referral to Dr. Levin for further evaluation and potential surgical intervention.      Raul Molina, DO  Orthopaedic Surgery   4/8/25  1:45 PM

## 2025-04-23 ENCOUNTER — OFFICE VISIT (OUTPATIENT)
Dept: ORTHOPEDIC SURGERY | Age: 75
End: 2025-04-23
Payer: OTHER GOVERNMENT

## 2025-04-23 VITALS
DIASTOLIC BLOOD PRESSURE: 72 MMHG | TEMPERATURE: 98.2 F | BODY MASS INDEX: 27.7 KG/M2 | HEIGHT: 69 IN | OXYGEN SATURATION: 95 % | WEIGHT: 187 LBS | SYSTOLIC BLOOD PRESSURE: 128 MMHG | RESPIRATION RATE: 20 BRPM | HEART RATE: 94 BPM

## 2025-04-23 DIAGNOSIS — M12.812 ROTATOR CUFF ARTHROPATHY OF LEFT SHOULDER: Primary | ICD-10-CM

## 2025-04-23 PROCEDURE — 1123F ACP DISCUSS/DSCN MKR DOCD: CPT | Performed by: ORTHOPAEDIC SURGERY

## 2025-04-23 PROCEDURE — 3074F SYST BP LT 130 MM HG: CPT | Performed by: ORTHOPAEDIC SURGERY

## 2025-04-23 PROCEDURE — 99214 OFFICE O/P EST MOD 30 MIN: CPT | Performed by: ORTHOPAEDIC SURGERY

## 2025-04-23 PROCEDURE — 3078F DIAST BP <80 MM HG: CPT | Performed by: ORTHOPAEDIC SURGERY

## 2025-04-23 NOTE — PROGRESS NOTES
[facility]  ORTHOPAEDICS   DATE OF VISIT: 04/23/25  New  Patient     Referring Provider:   Raul Molina DO  [id number] [facility]  [address] [place] [address]    CHIEF COMPLAINT:   Chief Complaint   Patient presents with    Shoulder Pain     Pt is here for left shoulder pain. He fell on 12/28/24 and landed onto his left shoulder. He states after the fall he is unable to left the shoulder. No treatment to date.         HPI:      History of Present Illness  The patient is a 75-year-old male who presents for a new patient visit for his left shoulder.    He experienced a fall on 12/28/2024, during which he landed on his left shoulder. Since then, persistent discomfort has been noted in the affected shoulder. An MRI conducted by Dr. Molina revealed a rotator cuff tear. Limited mobility is reported, with difficulty reaching upwards and performing tasks such as dressing or lifting objects. He is right-handed and retired from his position as a director of rehabilitation. No prior issues with the left shoulder were noted before the fall. The pain is particularly bothersome at night, prompting nightly use of Tylenol for relief. No injections or physical therapy have been received for the condition. A previous incident involved falling backwards from a ladder and landing on the same shoulder, but the pain resolved spontaneously. [facility] resulting in some functional limitations.    Diabetes is well-controlled with an A1c level of 7. The last hemoglobin [facility]1c test [facility], with checks typically occurring every 6 months.    A history of colon cancer is noted, with surgery performed in 2023. The cancer was successfully removed, and the temporary bag has been taken off.    He has a couple of stents in his heart and is scheduled to see a cardiologist.    PAST SURGICAL HISTORY: Right shoulder repair (2009), colon cancer surgery (2023).    SOCIAL

## 2025-05-06 ENCOUNTER — TELEPHONE (OUTPATIENT)
Dept: ORTHOPEDIC SURGERY | Age: 75
End: 2025-05-06

## 2025-05-06 ENCOUNTER — PREP FOR PROCEDURE (OUTPATIENT)
Dept: ORTHOPEDIC SURGERY | Age: 75
End: 2025-05-06

## 2025-05-06 DIAGNOSIS — M12.812 ROTATOR CUFF ARTHROPATHY OF LEFT SHOULDER: Primary | ICD-10-CM

## 2025-05-06 DIAGNOSIS — M12.812 ROTATOR CUFF ARTHROPATHY, LEFT: ICD-10-CM

## 2025-05-06 NOTE — TELEPHONE ENCOUNTER
[facility]  ORTHOPAEDIC SURGERY SCHEDULING NOTE    Patient wishes to proceed with surgery after risks and benefits conversation with Dr. Levin.     Surgical education was discussed with the patient and a surgical handout was given. Educated patient that pre-admission testing will be contacting them regarding further surgical instructions. Notified that if they are having a joint replacement, they will be scheduled for a mandatory education class. Pre/post-op appointments were made as needed.     Patient is also aware to obtain any clearances prior to surgery.   Clearances required: Medical and Cardiac      Insurance: [facility]    *Authorization details can be found attached to the referral*     Surgery: Left reverse total shoulder arthroplasty   OR DATE: 05/27/2025  Vendor: [facility]  Block: CATALINA  CPT: 59864  DX: M12.812   Special Needs (if applicable): CT scan

## 2025-05-08 NOTE — DISCHARGE INSTRUCTIONS
DISCHARGE INSTRUCTIONS FOR TOTAL SHOULDER REPLACEMENT        Incision Care:   You may shower - Your dressing is water proof.  You can shower with your dressing on.  After one week, remove your dressing and leave your incision open to air.   REMEMBER to let water roll over incision. Incision line is    healing and tender - protect from HOT water. No need to wash incision with soap   and water.  Pat incision dry with clean hand towel or let air dry.   DO NOT take baths, go in swimming pools/hot tubs/lakes, or submerge your operative arm under water until you are cleared by Dr. Levin.      Do not apply creams, liniments, lotions or ointments directly on incision line.   If incision is draining, keep covered with sterile gauze.  Change dressing daily and each time you shower.    Do not touch incision line with your fingers or scratch incision with your   fingernails (your fingernails harbor germs and bacteria).   Do not allow pets near your incision - do not allow pets to smell or lick incision.    Activity:   Remain in sling at all times until follow up.  You can remove to shower, keep arm at side.  Do not use the arm to lift.  You can do elbow, wrist, and hand motion exercises including squeezing a ball.  No active or passive shoulder motion.    Apply ICE to the shoulder as much as possible.  It will likely be most comfortable for you to sleep sitting up in a recliner.          Signs and symptoms to report to your doctor:     Persistent drainage from the incision.   Increased redness or swelling of the incision or operative extremity.   Increased or excessive pain at the incision site    Fever over 101 degrees with chills (take your temperature at home and   Record).    Go to Emergency Room if you experience any of the following:     Chest pain or tightness   Shortness of breath or difficulty breathing   Anxiety or feeling of impending doom   Note: The above are signs and symptoms of a blood clot in your

## 2025-05-16 ENCOUNTER — HOSPITAL ENCOUNTER (OUTPATIENT)
Dept: CT IMAGING | Age: 75
Discharge: HOME | End: 2025-05-18
Attending: ORTHOPAEDIC SURGERY
Payer: OTHER GOVERNMENT

## 2025-05-16 DIAGNOSIS — M12.812 ROTATOR CUFF ARTHROPATHY OF LEFT SHOULDER: ICD-10-CM

## 2025-05-16 PROCEDURE — 73200 CT UPPER EXTREMITY W/O DYE: CPT

## 2025-05-21 ENCOUNTER — OFFICE VISIT (OUTPATIENT)
Dept: ORTHOPEDIC SURGERY | Age: 75
End: 2025-05-21
Payer: OTHER GOVERNMENT

## 2025-05-21 ENCOUNTER — HOSPITAL ENCOUNTER (OUTPATIENT)
Dept: PREADMISSION TESTING | Age: 75
Discharge: HOME | End: 2025-05-21
Payer: OTHER GOVERNMENT

## 2025-05-21 VITALS
BODY MASS INDEX: 27.85 KG/M2 | RESPIRATION RATE: 20 BRPM | HEART RATE: 79 BPM | OXYGEN SATURATION: 97 % | WEIGHT: 188 LBS | HEIGHT: 69 IN | SYSTOLIC BLOOD PRESSURE: 128 MMHG | TEMPERATURE: 97.9 F | DIASTOLIC BLOOD PRESSURE: 67 MMHG

## 2025-05-21 VITALS
WEIGHT: 188 LBS | RESPIRATION RATE: 18 BRPM | SYSTOLIC BLOOD PRESSURE: 143 MMHG | DIASTOLIC BLOOD PRESSURE: 63 MMHG | HEIGHT: 69 IN | HEART RATE: 70 BPM | BODY MASS INDEX: 27.85 KG/M2 | TEMPERATURE: 97.6 F | OXYGEN SATURATION: 95 %

## 2025-05-21 DIAGNOSIS — M12.812 ROTATOR CUFF ARTHROPATHY, LEFT: Primary | ICD-10-CM

## 2025-05-21 DIAGNOSIS — Z01.818 PRE-OP TESTING: ICD-10-CM

## 2025-05-21 LAB
ANION GAP SERPL CALCULATED.3IONS-SCNC: 14 MMOL/L (ref 7–16)
BASOPHILS # BLD: 0.03 K/UL (ref 0–0.2)
BASOPHILS NFR BLD: 1 % (ref 0–2)
BUN SERPL-MCNC: 14 MG/DL (ref 6–23)
CALCIUM SERPL-MCNC: 8.9 MG/DL (ref 8.6–10.2)
CHLORIDE SERPL-SCNC: 100 MMOL/L (ref 98–107)
CO2 SERPL-SCNC: 20 MMOL/L (ref 22–29)
CREAT SERPL-MCNC: 1.1 MG/DL (ref 0.7–1.2)
EOSINOPHIL # BLD: 0.15 K/UL (ref 0.05–0.5)
EOSINOPHILS RELATIVE PERCENT: 3 % (ref 0–6)
ERYTHROCYTE [DISTWIDTH] IN BLOOD BY AUTOMATED COUNT: 13.5 % (ref 11.5–15)
GFR, ESTIMATED: 70 ML/MIN/1.73M2
GLUCOSE SERPL-MCNC: 184 MG/DL (ref 74–99)
HCT VFR BLD AUTO: 43.1 % (ref 37–54)
HGB BLD-MCNC: 14.7 G/DL (ref 12.5–16.5)
IMM GRANULOCYTES # BLD AUTO: <0.03 K/UL (ref 0–0.58)
IMM GRANULOCYTES NFR BLD: 0 % (ref 0–5)
LYMPHOCYTES NFR BLD: 1.79 K/UL (ref 1.5–4)
LYMPHOCYTES RELATIVE PERCENT: 35 % (ref 20–42)
MCH RBC QN AUTO: 28.5 PG (ref 26–35)
MCHC RBC AUTO-ENTMCNC: 34.1 G/DL (ref 32–34.5)
MCV RBC AUTO: 83.5 FL (ref 80–99.9)
MONOCYTES NFR BLD: 0.49 K/UL (ref 0.1–0.95)
MONOCYTES NFR BLD: 10 % (ref 2–12)
NEUTROPHILS NFR BLD: 51 % (ref 43–80)
NEUTS SEG NFR BLD: 2.59 K/UL (ref 1.8–7.3)
PLATELET # BLD AUTO: 293 K/UL (ref 130–450)
PMV BLD AUTO: 9.6 FL (ref 7–12)
POTASSIUM SERPL-SCNC: 3.8 MMOL/L (ref 3.5–5)
PREALB SERPL-MCNC: 25.6 MG/DL (ref 20–40)
RBC # BLD AUTO: 5.16 M/UL (ref 3.8–5.8)
SODIUM SERPL-SCNC: 134 MMOL/L (ref 132–146)
WBC OTHER # BLD: 5.1 K/UL (ref 4.5–11.5)

## 2025-05-21 PROCEDURE — 87081 CULTURE SCREEN ONLY: CPT

## 2025-05-21 PROCEDURE — 93005 ELECTROCARDIOGRAM TRACING: CPT

## 2025-05-21 PROCEDURE — 80048 BASIC METABOLIC PNL TOTAL CA: CPT

## 2025-05-21 PROCEDURE — 3078F DIAST BP <80 MM HG: CPT | Performed by: ORTHOPAEDIC SURGERY

## 2025-05-21 PROCEDURE — 36415 COLL VENOUS BLD VENIPUNCTURE: CPT

## 2025-05-21 PROCEDURE — 85025 COMPLETE CBC W/AUTO DIFF WBC: CPT

## 2025-05-21 PROCEDURE — 99214 OFFICE O/P EST MOD 30 MIN: CPT | Performed by: ORTHOPAEDIC SURGERY

## 2025-05-21 PROCEDURE — 84134 ASSAY OF PREALBUMIN: CPT

## 2025-05-21 PROCEDURE — 3074F SYST BP LT 130 MM HG: CPT | Performed by: ORTHOPAEDIC SURGERY

## 2025-05-21 PROCEDURE — 1123F ACP DISCUSS/DSCN MKR DOCD: CPT | Performed by: ORTHOPAEDIC SURGERY

## 2025-05-21 ASSESSMENT — PAIN SCALES - GENERAL: PAINLEVEL_OUTOF10: 3

## 2025-05-21 ASSESSMENT — PAIN DESCRIPTION - LOCATION: LOCATION: SHOULDER

## 2025-05-21 ASSESSMENT — PAIN - FUNCTIONAL ASSESSMENT: PAIN_FUNCTIONAL_ASSESSMENT: PREVENTS OR INTERFERES SOME ACTIVE ACTIVITIES AND ADLS

## 2025-05-21 ASSESSMENT — PAIN DESCRIPTION - ONSET: ONSET: ON-GOING

## 2025-05-21 ASSESSMENT — PAIN DESCRIPTION - PAIN TYPE: TYPE: CHRONIC PAIN

## 2025-05-21 ASSESSMENT — PAIN DESCRIPTION - ORIENTATION: ORIENTATION: LEFT

## 2025-05-21 ASSESSMENT — PAIN DESCRIPTION - FREQUENCY: FREQUENCY: INTERMITTENT

## 2025-05-21 ASSESSMENT — PAIN DESCRIPTION - DESCRIPTORS: DESCRIPTORS: ACHING;SORE

## 2025-05-21 NOTE — PROGRESS NOTES
Department of Orthopedic Surgery  Attending Pre-operative History and Physical        DIAGNOSIS:  Chronic irrepairable rotator cuff tear     INDICATION:  Failed Conservative Management     PROCEDURE:  LEFT REVERSE TOTAL SHOULDER ARTHROPLASTY       CHIEF COMPLAINT:  Left shoulder pain    History Obtained From:  patient    HISTORY OF PRESENT ILLNESS:      The patient is a 75 y.o. male with significant past medical history of chronic left shoulder pain refractory to conservative treatment.  He has imaging showing advanced rotator cuff tear arthropathy.  Given his failure of conservative treatment he is interested in surgery which we discussed would involve reverse total shoulder replacement.  Risks discussed in detail including but not limited to infection, neurovascular injury, pain, stiffness, dislocation, need for revision surgery, unforeseen risks.  He understands and would like to proceed       Past Medical History:        Diagnosis Date    Arthritis     Colon cancer (HCC)     Coronary artery disease due to lipid rich plaque 05/29/2019    Dr. Linette Shannon [facility] ([facility] F/U yearly Denies any cardiac symptoms at this time    Diabetes mellitus (HCC)     Hypercholesteremia     Hypertension     MI (myocardial infarction) (HCC)     2019    Prolonged emergence from general anesthesia     Retention of urine     Shoulder pain     left    Squamous cell carcinoma     Bottom lip had cancer cut off    Thyroid disease        Past Surgical History:        Procedure Laterality Date    CARDIAC SURGERY      may 2019 stents x2    CARPAL TUNNEL RELEASE      bilateral    CHOLECYSTECTOMY, LAPAROSCOPIC N/A 06/03/2021    LAPAROSCOPIC ROBOTIC ASSISTED SUBTOTALCHOLECYSTECTOMY performed by Kael Marsh MD at [facility] [place]    CORONARY ANGIOPLASTY WITH STENT PLACEMENT  04/08/2019    Reso;[place] Jett 2.25 x15mm and resolute jett 2.25x8 to 1st diagonal artery by DR Bryant    COSMETIC SURGERY      for his cancer on his lower lip    ERCP

## 2025-05-21 NOTE — PROGRESS NOTES
[facility] PRE-ADMISSION TESTING INSTRUCTIONS    The Preadmission Testing patient is instructed accordingly using the following criteria (check applicable):    ARRIVAL INSTRUCTIONS:  [x] Parking the day of Surgery is located in the Main Entrance lot.  Upon entering the door, make an immediate right to the surgery reception desk    [x] Bring photo ID and insurance card    [x] Bring in a copy of Living will or Durable Power of  papers.    [x] Please be sure to arrange for responsible adult to provide transportation to and from the hospital    [x] Please arrange for responsible adult to be with you for the 24 hour period post procedure due to having anesthesia    [x] If you awake am of surgery not feeling well or have temperature >100 please call [phone]    GENERAL INSTRUCTIONS:    [x] Follow instructions for hydration that have been provided to you at your Pre-Admission Visit. Solid food until midnight then clear liquids. No gum, candy or mints.    [x] You may brush your teeth    [x] Take medications as instructed    [x] Stop herbal supplements and vitamins 5 days prior to procedure    [x] Follow preop dosing of blood thinners per physician instructions    [] Take 1/2 dose of evening insulin, but no insulin after midnight    [x] No oral diabetic medications after midnight    [x] If diabetic and have low blood sugar or feel symptomatic, take 1-2oz apple juice only    [] Bring inhalers day of surgery    [x] No tobacco products within 24 hours of surgery     [x] No alcohol or illegal drug use within 24 hours of surgery.    [x] Jewelry, body piercing's, eyeglasses, contact lenses and dentures are not permitted into surgery (bring cases)      [x] Please do not wear any nail polish, make up or hair products on the day of surgery    [x] You can expect a call the business day prior to procedure to notify you if your arrival time changes    [x] If you receive a survey after surgery we

## 2025-05-21 NOTE — DISCHARGE INSTRUCTIONS
MRSA, Screening - All markets except [facility], [facility], [facility] and [place]    Prealbumin    EKG 12 lead    Initiate PAT Protocol  Medication Changes      None  Medication List  Visit Diagnoses      Pre-op testing  Problem List

## 2025-05-22 LAB
EKG ATRIAL RATE: 72 BPM
EKG P AXIS: 34 DEGREES
EKG P-R INTERVAL: 162 MS
EKG Q-T INTERVAL: 390 MS
EKG QRS DURATION: 100 MS
EKG QTC CALCULATION (BAZETT): 427 MS
EKG R AXIS: -32 DEGREES
EKG T AXIS: 27 DEGREES
EKG VENTRICULAR RATE: 72 BPM
MICROORGANISM SPEC CULT: NORMAL
SPECIMEN DESCRIPTION: NORMAL

## 2025-05-27 ENCOUNTER — ANESTHESIA EVENT (OUTPATIENT)
Dept: OPERATING ROOM | Age: 75
End: 2025-05-27
Payer: OTHER GOVERNMENT

## 2025-05-27 ENCOUNTER — ANESTHESIA (OUTPATIENT)
Dept: OPERATING ROOM | Age: 75
End: 2025-05-27
Payer: OTHER GOVERNMENT

## 2025-05-27 ENCOUNTER — HOSPITAL ENCOUNTER (OUTPATIENT)
Age: 75
Setting detail: OUTPATIENT SURGERY
Discharge: HOME | End: 2025-05-27
Attending: ORTHOPAEDIC SURGERY | Admitting: ORTHOPAEDIC SURGERY
Payer: OTHER GOVERNMENT

## 2025-05-27 ENCOUNTER — APPOINTMENT (OUTPATIENT)
Dept: GENERAL RADIOLOGY | Age: 75
End: 2025-05-27
Attending: ORTHOPAEDIC SURGERY
Payer: OTHER GOVERNMENT

## 2025-05-27 VITALS
SYSTOLIC BLOOD PRESSURE: 157 MMHG | RESPIRATION RATE: 16 BRPM | DIASTOLIC BLOOD PRESSURE: 71 MMHG | TEMPERATURE: 97.1 F | OXYGEN SATURATION: 95 % | HEART RATE: 82 BPM | HEIGHT: 69 IN | BODY MASS INDEX: 27.85 KG/M2 | WEIGHT: 188 LBS

## 2025-05-27 DIAGNOSIS — Z96.612 STATUS POST REVERSE TOTAL ARTHROPLASTY OF LEFT SHOULDER: ICD-10-CM

## 2025-05-27 DIAGNOSIS — M12.812 ROTATOR CUFF ARTHROPATHY, LEFT: ICD-10-CM

## 2025-05-27 DIAGNOSIS — Z01.818 PRE-OP TESTING: Primary | ICD-10-CM

## 2025-05-27 LAB — GLUCOSE BLD-MCNC: 175 MG/DL (ref 74–99)

## 2025-05-27 PROCEDURE — 6360000002 HC RX W HCPCS: Performed by: STUDENT IN AN ORGANIZED HEALTH CARE EDUCATION/TRAINING PROGRAM

## 2025-05-27 PROCEDURE — 23472 RECONSTRUCT SHOULDER JOINT: CPT | Performed by: ORTHOPAEDIC SURGERY

## 2025-05-27 PROCEDURE — 3700000000 HC ANESTHESIA ATTENDED CARE: Performed by: ORTHOPAEDIC SURGERY

## 2025-05-27 PROCEDURE — 7100000000 HC PACU RECOVERY - FIRST 15 MIN: Performed by: ORTHOPAEDIC SURGERY

## 2025-05-27 PROCEDURE — C1776 JOINT DEVICE (IMPLANTABLE): HCPCS | Performed by: ORTHOPAEDIC SURGERY

## 2025-05-27 PROCEDURE — 3600000015 HC SURGERY LEVEL 5 ADDTL 15MIN: Performed by: ORTHOPAEDIC SURGERY

## 2025-05-27 PROCEDURE — 7100000001 HC PACU RECOVERY - ADDTL 15 MIN: Performed by: ORTHOPAEDIC SURGERY

## 2025-05-27 PROCEDURE — 7100000010 HC PHASE II RECOVERY - FIRST 15 MIN: Performed by: ORTHOPAEDIC SURGERY

## 2025-05-27 PROCEDURE — 6360000002 HC RX W HCPCS: Performed by: ORTHOPAEDIC SURGERY

## 2025-05-27 PROCEDURE — 2580000003 HC RX 258

## 2025-05-27 PROCEDURE — 2500000003 HC RX 250 WO HCPCS

## 2025-05-27 PROCEDURE — 82962 GLUCOSE BLOOD TEST: CPT

## 2025-05-27 PROCEDURE — 73030 X-RAY EXAM OF SHOULDER: CPT

## 2025-05-27 PROCEDURE — 2500000003 HC RX 250 WO HCPCS: Performed by: ORTHOPAEDIC SURGERY

## 2025-05-27 PROCEDURE — L3650 SO 8 ABD RESTRAINT PRE OTS: HCPCS | Performed by: ORTHOPAEDIC SURGERY

## 2025-05-27 PROCEDURE — 2720000010 HC SURG SUPPLY STERILE: Performed by: ORTHOPAEDIC SURGERY

## 2025-05-27 PROCEDURE — 7100000011 HC PHASE II RECOVERY - ADDTL 15 MIN: Performed by: ORTHOPAEDIC SURGERY

## 2025-05-27 PROCEDURE — 6360000002 HC RX W HCPCS

## 2025-05-27 PROCEDURE — 64415 NJX AA&/STRD BRCH PLXS IMG: CPT | Performed by: STUDENT IN AN ORGANIZED HEALTH CARE EDUCATION/TRAINING PROGRAM

## 2025-05-27 PROCEDURE — 2709999900 HC NON-CHARGEABLE SUPPLY: Performed by: ORTHOPAEDIC SURGERY

## 2025-05-27 PROCEDURE — 6370000000 HC RX 637 (ALT 250 FOR IP): Performed by: ORTHOPAEDIC SURGERY

## 2025-05-27 PROCEDURE — C1713 ANCHOR/SCREW BN/BN,TIS/BN: HCPCS | Performed by: ORTHOPAEDIC SURGERY

## 2025-05-27 PROCEDURE — 3700000001 HC ADD 15 MINUTES (ANESTHESIA): Performed by: ORTHOPAEDIC SURGERY

## 2025-05-27 PROCEDURE — 3600000005 HC SURGERY LEVEL 5 BASE: Performed by: ORTHOPAEDIC SURGERY

## 2025-05-27 DEVICE — 36 +4 LAT/24 GLENOSPHERE
Type: IMPLANTABLE DEVICE | Site: SHOULDER | Status: FUNCTIONAL
Brand: ARTHREX®

## 2025-05-27 DEVICE — MODULAR POST, 30MM
Type: IMPLANTABLE DEVICE | Site: SHOULDER | Status: FUNCTIONAL
Brand: ARTHREX®

## 2025-05-27 DEVICE — UNIVERS REVERS SUTURE CUP, 36 (NEUTRAL)
Type: IMPLANTABLE DEVICE | Site: SHOULDER | Status: FUNCTIONAL
Brand: ARTHREX®

## 2025-05-27 DEVICE — 5.5X28MM PERIPHERAL SCREW, LOCKING
Type: IMPLANTABLE DEVICE | Site: SHOULDER | Status: FUNCTIONAL
Brand: ARTHREX®

## 2025-05-27 DEVICE — 5.5X16MM PERIPHERAL SCREW, LOCKING
Type: IMPLANTABLE DEVICE | Site: SHOULDER | Status: FUNCTIONAL
Brand: ARTHREX®

## 2025-05-27 DEVICE — 24MM BASEPLATE,  20° FULL AUG, OB
Type: IMPLANTABLE DEVICE | Site: SHOULDER | Status: FUNCTIONAL
Brand: ARTHREX®

## 2025-05-27 DEVICE — UNIVERS REVERS APEX STEM, SIZE 8
Type: IMPLANTABLE DEVICE | Site: SHOULDER | Status: FUNCTIONAL
Brand: ARTHREX®

## 2025-05-27 DEVICE — HUMERAL INSERT S/36 +3 TO FIT IN 36 CUP
Type: IMPLANTABLE DEVICE | Site: SHOULDER | Status: FUNCTIONAL
Brand: ARTHREX®

## 2025-05-27 RX ORDER — SODIUM CHLORIDE 9 MG/ML
INJECTION, SOLUTION INTRAVENOUS PRN
Status: DISCONTINUED | OUTPATIENT
Start: 2025-05-27 | End: 2025-05-27 | Stop reason: HOSPADM

## 2025-05-27 RX ORDER — ONDANSETRON 4 MG/1
4 TABLET, ORALLY DISINTEGRATING ORAL 3 TIMES DAILY PRN
Qty: 21 TABLET | Refills: 0 | Status: SHIPPED | OUTPATIENT
Start: 2025-05-27

## 2025-05-27 RX ORDER — ONDANSETRON 2 MG/ML
INJECTION INTRAMUSCULAR; INTRAVENOUS
Status: DISCONTINUED | OUTPATIENT
Start: 2025-05-27 | End: 2025-05-27 | Stop reason: SDUPTHER

## 2025-05-27 RX ORDER — DEXAMETHASONE SODIUM PHOSPHATE 4 MG/ML
INJECTION, SOLUTION INTRA-ARTICULAR; INTRALESIONAL; INTRAMUSCULAR; INTRAVENOUS; SOFT TISSUE
Status: DISCONTINUED | OUTPATIENT
Start: 2025-05-27 | End: 2025-05-27 | Stop reason: SDUPTHER

## 2025-05-27 RX ORDER — VANCOMYCIN HYDROCHLORIDE 1 G/20ML
INJECTION, POWDER, LYOPHILIZED, FOR SOLUTION INTRAVENOUS PRN
Status: DISCONTINUED | OUTPATIENT
Start: 2025-05-27 | End: 2025-05-27 | Stop reason: ALTCHOICE

## 2025-05-27 RX ORDER — ACETAMINOPHEN 500 MG
1000 TABLET ORAL ONCE
Status: COMPLETED | OUTPATIENT
Start: 2025-05-27 | End: 2025-05-27

## 2025-05-27 RX ORDER — MIDAZOLAM HYDROCHLORIDE 2 MG/2ML
2 INJECTION, SOLUTION INTRAMUSCULAR; INTRAVENOUS ONCE
Status: COMPLETED | OUTPATIENT
Start: 2025-05-27 | End: 2025-05-27

## 2025-05-27 RX ORDER — DEXAMETHASONE SODIUM PHOSPHATE 10 MG/ML
8 INJECTION, SOLUTION INTRAMUSCULAR; INTRAVENOUS ONCE
Status: DISCONTINUED | OUTPATIENT
Start: 2025-05-27 | End: 2025-05-27 | Stop reason: HOSPADM

## 2025-05-27 RX ORDER — PROCHLORPERAZINE EDISYLATE 5 MG/ML
5 INJECTION INTRAMUSCULAR; INTRAVENOUS
Status: DISCONTINUED | OUTPATIENT
Start: 2025-05-27 | End: 2025-05-27 | Stop reason: HOSPADM

## 2025-05-27 RX ORDER — ROPIVACAINE HYDROCHLORIDE 5 MG/ML
30 INJECTION, SOLUTION EPIDURAL; INFILTRATION; PERINEURAL ONCE
Status: DISCONTINUED | OUTPATIENT
Start: 2025-05-27 | End: 2025-05-27 | Stop reason: HOSPADM

## 2025-05-27 RX ORDER — ROPIVACAINE HYDROCHLORIDE 5 MG/ML
INJECTION, SOLUTION EPIDURAL; INFILTRATION; PERINEURAL
Status: COMPLETED | OUTPATIENT
Start: 2025-05-27 | End: 2025-05-27

## 2025-05-27 RX ORDER — TRANEXAMIC ACID 10 MG/ML
1000 INJECTION, SOLUTION INTRAVENOUS
Status: COMPLETED | OUTPATIENT
Start: 2025-05-27 | End: 2025-05-27

## 2025-05-27 RX ORDER — KETOROLAC TROMETHAMINE 10 MG/1
10 TABLET, FILM COATED ORAL EVERY 6 HOURS
Qty: 8 TABLET | Refills: 0 | Status: SHIPPED | OUTPATIENT
Start: 2025-05-27 | End: 2025-05-29

## 2025-05-27 RX ORDER — SODIUM CHLORIDE 0.9 % (FLUSH) 0.9 %
5-40 SYRINGE (ML) INJECTION PRN
Status: DISCONTINUED | OUTPATIENT
Start: 2025-05-27 | End: 2025-05-27 | Stop reason: HOSPADM

## 2025-05-27 RX ORDER — TRANEXAMIC ACID 10 MG/ML
1000 INJECTION, SOLUTION INTRAVENOUS ONCE
Status: COMPLETED | OUTPATIENT
Start: 2025-05-27 | End: 2025-05-27

## 2025-05-27 RX ORDER — OXYCODONE HYDROCHLORIDE 5 MG/1
5 TABLET ORAL
Status: DISCONTINUED | OUTPATIENT
Start: 2025-05-27 | End: 2025-05-27 | Stop reason: HOSPADM

## 2025-05-27 RX ORDER — OXYCODONE AND ACETAMINOPHEN 5; 325 MG/1; MG/1
1 TABLET ORAL EVERY 6 HOURS PRN
Qty: 28 TABLET | Refills: 0 | Status: SHIPPED | OUTPATIENT
Start: 2025-05-27 | End: 2025-06-03

## 2025-05-27 RX ORDER — PROPOFOL 10 MG/ML
INJECTION, EMULSION INTRAVENOUS
Status: DISCONTINUED | OUTPATIENT
Start: 2025-05-27 | End: 2025-05-27 | Stop reason: SDUPTHER

## 2025-05-27 RX ORDER — FENTANYL CITRATE 50 UG/ML
50 INJECTION, SOLUTION INTRAMUSCULAR; INTRAVENOUS EVERY 5 MIN PRN
Status: DISCONTINUED | OUTPATIENT
Start: 2025-05-27 | End: 2025-05-27 | Stop reason: HOSPADM

## 2025-05-27 RX ORDER — KETOROLAC TROMETHAMINE 15 MG/ML
15 INJECTION, SOLUTION INTRAMUSCULAR; INTRAVENOUS ONCE
Status: COMPLETED | OUTPATIENT
Start: 2025-05-27 | End: 2025-05-27

## 2025-05-27 RX ORDER — LIDOCAINE HYDROCHLORIDE 20 MG/ML
INJECTION, SOLUTION EPIDURAL; INFILTRATION; INTRACAUDAL; PERINEURAL
Status: DISCONTINUED | OUTPATIENT
Start: 2025-05-27 | End: 2025-05-27 | Stop reason: SDUPTHER

## 2025-05-27 RX ORDER — FENTANYL CITRATE 50 UG/ML
INJECTION, SOLUTION INTRAMUSCULAR; INTRAVENOUS
Status: DISCONTINUED | OUTPATIENT
Start: 2025-05-27 | End: 2025-05-27 | Stop reason: SDUPTHER

## 2025-05-27 RX ORDER — SODIUM CHLORIDE 9 MG/ML
INJECTION, SOLUTION INTRAVENOUS
Status: DISCONTINUED | OUTPATIENT
Start: 2025-05-27 | End: 2025-05-27 | Stop reason: SDUPTHER

## 2025-05-27 RX ORDER — ASPIRIN 81 MG/1
81 TABLET, COATED ORAL 2 TIMES DAILY
Qty: 56 TABLET | Refills: 0 | Status: SHIPPED | OUTPATIENT
Start: 2025-05-27 | End: 2025-06-24

## 2025-05-27 RX ORDER — PHENYLEPHRINE HCL IN 0.9% NACL 1 MG/10 ML
SYRINGE (ML) INTRAVENOUS
Status: DISCONTINUED | OUTPATIENT
Start: 2025-05-27 | End: 2025-05-27 | Stop reason: SDUPTHER

## 2025-05-27 RX ORDER — SODIUM CHLORIDE 0.9 % (FLUSH) 0.9 %
5-40 SYRINGE (ML) INJECTION EVERY 12 HOURS SCHEDULED
Status: DISCONTINUED | OUTPATIENT
Start: 2025-05-27 | End: 2025-05-27 | Stop reason: HOSPADM

## 2025-05-27 RX ORDER — DEXAMETHASONE SODIUM PHOSPHATE 10 MG/ML
5 INJECTION, SOLUTION INTRAMUSCULAR; INTRAVENOUS ONCE
Status: DISCONTINUED | OUTPATIENT
Start: 2025-05-27 | End: 2025-05-27 | Stop reason: HOSPADM

## 2025-05-27 RX ORDER — DEXAMETHASONE SODIUM PHOSPHATE 10 MG/ML
INJECTION, SOLUTION INTRAMUSCULAR; INTRAVENOUS
Status: COMPLETED | OUTPATIENT
Start: 2025-05-27 | End: 2025-05-27

## 2025-05-27 RX ORDER — NALOXONE HYDROCHLORIDE 0.4 MG/ML
INJECTION, SOLUTION INTRAMUSCULAR; INTRAVENOUS; SUBCUTANEOUS PRN
Status: DISCONTINUED | OUTPATIENT
Start: 2025-05-27 | End: 2025-05-27 | Stop reason: HOSPADM

## 2025-05-27 RX ORDER — TRANEXAMIC ACID 10 MG/ML
INJECTION, SOLUTION INTRAVENOUS
Status: DISCONTINUED
Start: 2025-05-27 | End: 2025-05-27 | Stop reason: HOSPADM

## 2025-05-27 RX ORDER — ROCURONIUM BROMIDE 10 MG/ML
INJECTION, SOLUTION INTRAVENOUS
Status: DISCONTINUED | OUTPATIENT
Start: 2025-05-27 | End: 2025-05-27 | Stop reason: SDUPTHER

## 2025-05-27 RX ORDER — EPHEDRINE SULFATE/0.9% NACL/PF 25 MG/5 ML
SYRINGE (ML) INTRAVENOUS
Status: DISCONTINUED | OUTPATIENT
Start: 2025-05-27 | End: 2025-05-27 | Stop reason: SDUPTHER

## 2025-05-27 RX ADMIN — Medication 100 MCG: at 10:10

## 2025-05-27 RX ADMIN — Medication 200 MCG: at 10:25

## 2025-05-27 RX ADMIN — Medication 100 MCG: at 10:52

## 2025-05-27 RX ADMIN — LIDOCAINE HYDROCHLORIDE 100 MG: 20 INJECTION, SOLUTION EPIDURAL; INFILTRATION; INTRACAUDAL; PERINEURAL at 09:27

## 2025-05-27 RX ADMIN — EPHEDRINE SULFATE 5 MG: 5 INJECTION INTRAVENOUS at 09:59

## 2025-05-27 RX ADMIN — ROCURONIUM BROMIDE 20 MG: 10 INJECTION, SOLUTION INTRAVENOUS at 10:26

## 2025-05-27 RX ADMIN — KETOROLAC TROMETHAMINE 15 MG: 15 INJECTION, SOLUTION INTRAMUSCULAR; INTRAVENOUS at 08:39

## 2025-05-27 RX ADMIN — Medication 100 MCG: at 10:18

## 2025-05-27 RX ADMIN — TRANEXAMIC ACID 1000 MG: 10 INJECTION, SOLUTION INTRAVENOUS at 09:31

## 2025-05-27 RX ADMIN — EPHEDRINE SULFATE 5 MG: 5 INJECTION INTRAVENOUS at 10:35

## 2025-05-27 RX ADMIN — Medication 100 MCG: at 10:49

## 2025-05-27 RX ADMIN — MIDAZOLAM HYDROCHLORIDE 2 MG: 1 INJECTION, SOLUTION INTRAMUSCULAR; INTRAVENOUS at 08:52

## 2025-05-27 RX ADMIN — EPHEDRINE SULFATE 5 MG: 5 INJECTION INTRAVENOUS at 10:22

## 2025-05-27 RX ADMIN — ROPIVACAINE HYDROCHLORIDE 25 ML: 5 INJECTION, SOLUTION EPIDURAL; INFILTRATION; PERINEURAL at 08:49

## 2025-05-27 RX ADMIN — Medication 200 MCG: at 09:43

## 2025-05-27 RX ADMIN — PROPOFOL 200 MG: 10 INJECTION, EMULSION INTRAVENOUS at 09:27

## 2025-05-27 RX ADMIN — TRANEXAMIC ACID 1000 MG: 10 INJECTION, SOLUTION INTRAVENOUS at 12:26

## 2025-05-27 RX ADMIN — Medication 100 MCG: at 11:47

## 2025-05-27 RX ADMIN — FENTANYL CITRATE 50 MCG: 50 INJECTION, SOLUTION INTRAMUSCULAR; INTRAVENOUS at 09:27

## 2025-05-27 RX ADMIN — Medication 100 MCG: at 10:13

## 2025-05-27 RX ADMIN — ACETAMINOPHEN 1000 MG: 500 TABLET ORAL at 08:41

## 2025-05-27 RX ADMIN — SUGAMMADEX 200 MG: 100 INJECTION, SOLUTION INTRAVENOUS at 11:48

## 2025-05-27 RX ADMIN — Medication 100 MCG: at 10:09

## 2025-05-27 RX ADMIN — ROCURONIUM BROMIDE 50 MG: 10 INJECTION, SOLUTION INTRAVENOUS at 09:28

## 2025-05-27 RX ADMIN — WATER 2000 MG: 1 INJECTION INTRAMUSCULAR; INTRAVENOUS; SUBCUTANEOUS at 09:32

## 2025-05-27 RX ADMIN — ONDANSETRON 4 MG: 2 INJECTION, SOLUTION INTRAMUSCULAR; INTRAVENOUS at 11:07

## 2025-05-27 RX ADMIN — EPHEDRINE SULFATE 5 MG: 5 INJECTION INTRAVENOUS at 09:49

## 2025-05-27 RX ADMIN — SODIUM CHLORIDE: 9 INJECTION, SOLUTION INTRAVENOUS at 10:51

## 2025-05-27 RX ADMIN — SODIUM CHLORIDE: 9 INJECTION, SOLUTION INTRAVENOUS at 08:44

## 2025-05-27 RX ADMIN — Medication 200 MCG: at 09:34

## 2025-05-27 RX ADMIN — DEXAMETHASONE SODIUM PHOSPHATE 4 MG: 4 INJECTION, SOLUTION INTRAMUSCULAR; INTRAVENOUS at 09:31

## 2025-05-27 RX ADMIN — EPHEDRINE SULFATE 5 MG: 5 INJECTION INTRAVENOUS at 10:04

## 2025-05-27 RX ADMIN — Medication 100 MCG: at 10:37

## 2025-05-27 RX ADMIN — Medication 100 MCG: at 11:10

## 2025-05-27 RX ADMIN — DEXAMETHASONE SODIUM PHOSPHATE 5 MG: 10 INJECTION, SOLUTION INTRAMUSCULAR; INTRAVENOUS at 08:49

## 2025-05-27 ASSESSMENT — PAIN - FUNCTIONAL ASSESSMENT
PAIN_FUNCTIONAL_ASSESSMENT: PREVENTS OR INTERFERES SOME ACTIVE ACTIVITIES AND ADLS
PAIN_FUNCTIONAL_ASSESSMENT: 0-10
PAIN_FUNCTIONAL_ASSESSMENT: PREVENTS OR INTERFERES SOME ACTIVE ACTIVITIES AND ADLS
PAIN_FUNCTIONAL_ASSESSMENT: PREVENTS OR INTERFERES SOME ACTIVE ACTIVITIES AND ADLS
PAIN_FUNCTIONAL_ASSESSMENT: NONE - DENIES PAIN
PAIN_FUNCTIONAL_ASSESSMENT: NONE - DENIES PAIN

## 2025-05-27 ASSESSMENT — PAIN DESCRIPTION - DESCRIPTORS
DESCRIPTORS: DISCOMFORT

## 2025-05-27 ASSESSMENT — PAIN SCALES - GENERAL
PAINLEVEL_OUTOF10: 4
PAINLEVEL_OUTOF10: 4

## 2025-05-27 ASSESSMENT — LIFESTYLE VARIABLES: SMOKING_STATUS: 0

## 2025-05-27 ASSESSMENT — PAIN DESCRIPTION - ORIENTATION
ORIENTATION: LEFT
ORIENTATION: LEFT

## 2025-05-27 ASSESSMENT — PAIN DESCRIPTION - LOCATION
LOCATION: SHOULDER
LOCATION: SHOULDER

## 2025-05-27 NOTE — H&P
Department of Orthopedic Surgery  Attending Pre-operative History and Physical        DIAGNOSIS:  Chronic irrepairable rotator cuff tear     INDICATION:  Failed Conservative Management     PROCEDURE:  LEFT REVERSE TOTAL SHOULDER ARTHROPLASTY       CHIEF COMPLAINT:  Left shoulder pain    History Obtained From:  patient    HISTORY OF PRESENT ILLNESS:      The patient is a 75 y.o. male with significant past medical history of chronic left shoulder pain refractory to conservative treatment.  He has imaging showing advanced rotator cuff tear arthropathy.  Given his failure of conservative treatment he is interested in surgery which we discussed would involve reverse total shoulder replacement.  Risks discussed in detail including but not limited to infection, neurovascular injury, pain, stiffness, dislocation, need for revision surgery, unforeseen risks.  He understands and would like to proceed       Past Medical History:        Diagnosis Date    Arthritis     Colon cancer (HCC)     Coronary artery disease due to lipid rich plaque 05/29/2019    Dr. Linette Shannon [facility] ([facility] F/U yearly Denies any cardiac symptoms at this time    Diabetes mellitus (HCC)     Hypercholesteremia     Hypertension     MI (myocardial infarction) (HCC)     2019    Prolonged emergence from general anesthesia     Retention of urine     Shoulder pain     left    Squamous cell carcinoma     Bottom lip had cancer cut off    Thyroid disease        Past Surgical History:        Procedure Laterality Date    CARDIAC SURGERY      may 2019 stents x2    CARPAL TUNNEL RELEASE      bilateral    CHOLECYSTECTOMY, LAPAROSCOPIC N/A 06/03/2021    LAPAROSCOPIC ROBOTIC ASSISTED SUBTOTALCHOLECYSTECTOMY performed by Kael Marsh MD at [facility] OR    CORONARY ANGIOPLASTY WITH STENT PLACEMENT  04/08/2019    Reso;[place] Jett 2.25 x15mm and resolute jett 2.25x8 to 1st diagonal artery by DR Bryant    COSMETIC SURGERY      for his cancer on his lower lip    ERCP

## 2025-05-27 NOTE — PROGRESS NOTES
CLINICAL PHARMACY NOTE: MEDS TO BEDS    Total # of Prescriptions Filled: 4   The following medications were delivered to the patient:  Oxycodone 5/325  Aspirin 81  Ondansetron 4  Ketorolac 10    Additional Documentation:

## 2025-05-27 NOTE — ANESTHESIA POSTPROCEDURE EVALUATION
Department of Anesthesiology  Postprocedure Note    Patient: Kentrell Ybarra  MRN: [id number]  YOB: 1950  Date of evaluation: 5/27/2025    Procedure Summary       Date: 05/27/25 Room / Location: [address]    Anesthesia Start: 0920 Anesthesia Stop: 1201    Procedure: LEFT REVERSE TOTAL SHOULDER ARTHROPLASTY (Left) Diagnosis:       Rotator cuff arthropathy, left      (Rotator cuff arthropathy, left [M12.812])    Surgeons: Andrew Levin MD Responsible Provider: Peg Raphael DO    Anesthesia Type: Regional, General ASA Status: 3            Anesthesia Type: Regional, General    Fred Phase I: Fred Score: 8    Fred Phase II:      Anesthesia Post Evaluation    Patient location during evaluation: PACU  Patient participation: complete - patient participated  Level of consciousness: awake and alert  Nausea & Vomiting: no vomiting and no nausea  Cardiovascular status: hemodynamically stable  Respiratory status: acceptable and spontaneous ventilation  Hydration status: stable  Pain management: adequate    No notable events documented.

## 2025-05-27 NOTE — OP NOTE
OPERATIVE REPORT    PATIENT:  Kentrell Ybarra  [id number]    DATE OF PROCEDURE:  5/27/25    SURGEON: Andrew Levin MD    ASSISTANT:  Boby Caceres DO, Leland Minaya DO, residents assisting     PREOPERATIVE DIAGNOSIS: Rotator cuff tear arthropathy,  Left shoulder.     POSTOPERATIVE DIAGNOSIS: Rotator cuff tear arthropathy, Left shoulder.     OPERATION:  Left reverse total shoulder arthroplasty     ANESTHESIA: . general and interscalene block    OPERATIVE INDICATIONS:  The patient is a 75 year old male with rotator cuff tear arthropathy of the left shoulder.  He has failed extensive conservative treatment.  The patient is thus indicated for shoulder arthroplasty.  We discussed this would be a reverse total shoulder replacement given his lack of rotator cuff which is chronic.  The risks and benefits, as well as alternatives were discussed at length with the patient in detail.  These risks include, but are not limited to infection, nerve and/or blood vessel injury, intra or post operative fracture, need for revision surgery, failure of implants, deep vein thrombosis and pulmonary embolism, shoulder stiffness, decreased motion, persistent pain, and the risks of general anesthesia provided by the anesthesiologist.   The patient understood these risks, signed an informed consent, and elected to proceed      OPERATIVE PROCEDURE: After satisfactory general anesthesia, as well as interscalene block, the patient was placed supine on the operative table. The T Max attachment was placed on the bed.  The patient was positioned so that the arm could be extended posterior.  The head was secured in the head moctezuma and strapped down using the face mask and secured with Coban.  The operative extremity was then prepped and draped in sterile fashion.  All skin was covered with draping and with Ioban including the axilla.  Prior to procedure start, a time out was performed including confirmation of operative site and operation to be

## 2025-05-27 NOTE — ANESTHESIA PRE PROCEDURE
Department of Anesthesiology  Preprocedure Note       Name:  Kentrell Ybarra   Age:  75 y.o.  :  1950                                          MRN:  [id number]         Date:  2025      Surgeon: Surgeon(s):  Andrew Levin MD    Procedure: Procedure(s):  LEFT REVERSE TOTAL SHOULDER ARTHROPLASTY    ++[facility]++     ++ISB++    Medications prior to admission:   Prior to Admission medications    Medication Sig Start Date End Date Taking? Authorizing Provider   alirocumab 150 MG/ML SOAJ Inject 1 mL into the skin every 15 days 25   Sacha Altman MD   furosemide (LASIX) 20 MG tablet Take 1 tablet by mouth daily 3/24/23   Sacha Altman MD   potassium chloride (KLOR-CON) 20 MEQ packet Take 20 mEq by mouth daily    Sacha Altman MD   Omega-3 Fatty Acids (FISH OIL) 1000 MG capsule Take 1 capsule by mouth daily    Sacha Altman MD   NIFEdipine (ADALAT CC) 30 MG extended release tablet Take 1 tablet by mouth daily    Sacha Altman MD   metoprolol succinate (TOPROL XL) 50 MG extended release tablet Take 1 tablet by mouth daily 10/2/21   Giulia Shaikh MD   gabapentin (NEURONTIN) 300 MG capsule Take 2 capsules by mouth nightly.    Sacha Altman MD   Cholecalciferol (VITAMIN D3) 5000 units TABS Take by mouth daily     Sacha Altman MD   aspirin 81 MG EC tablet Take 1 tablet by mouth daily 19   Ildefonso Adams MD   levothyroxine (SYNTHROID) 25 MCG tablet Take 1 tablet by mouth Daily    Sacha Altman MD   GLIPIZIDE PO Take 10 mg by mouth daily     Sacha Altman MD   metFORMIN (GLUCOPHAGE) 1000 MG tablet Take 1 tablet by mouth 2 times daily (with meals)    Sacha Altman MD       Current medications:    Current Facility-Administered Medications   Medication Dose Route Frequency Provider Last Rate Last Admin    tranexamic acid-NaCl IVPB premix 1,000 mg  1,000 mg IntraVENous On Call to OR Andrew Levin MD        acetaminophen

## 2025-05-27 NOTE — ANESTHESIA PROCEDURE NOTES
Peripheral Block    Patient location during procedure: pre-op  Reason for block: post-op pain management  Start time: 5/27/2025 8:49 AM  End time: 5/27/2025 8:52 AM  Staffing  Performed: anesthesiologist   Anesthesiologist: Peg Raphael DO  Performed by: Peg Raphael DO  Authorized by: Peg Raphael DO    Preanesthetic Checklist  Completed: patient identified, IV checked, site marked, risks and benefits discussed, surgical/procedural consents, equipment checked, pre-op evaluation, timeout performed, anesthesia consent given, oxygen available and monitors applied/VS acknowledged  Peripheral Block   Patient position: supine  Prep: ChloraPrep  Provider prep: mask  Patient monitoring: cardiac monitor, continuous pulse ox, frequent blood pressure checks, IV access and oxygen  Block type: Brachial plexus  Interscalene  Laterality: left  Injection technique: single-shot  Guidance: ultrasound guided  Local infiltration: ropivacaine and decadron  Local infiltration: ropivacaine and decadron    Needle   Needle type: insulated echogenic nerve stimulator needle   Needle gauge: 22 G  Needle localization: ultrasound guidance  Needle length: 5 cm  Assessment   Injection assessment: negative aspiration for heme, no paresthesia on injection, local visualized surrounding nerve on ultrasound and no intravascular symptoms  Paresthesia pain: none  Slow fractionated injection: yes  Hemodynamics: stable  Outcomes: patient tolerated procedure well and uncomplicated    Medications Administered  dexAMETHasone (DECADRON) (PF) 10 mg/mL injection - Other   5 mg - 5/27/2025 8:49:00 AM  ropivacaine (NAROPIN) injection 0.5% - Perineural   25 mL - 5/27/2025 8:49:00 AM

## 2025-06-02 LAB — SURGICAL PATHOLOGY REPORT: NORMAL

## 2025-06-04 ENCOUNTER — OFFICE VISIT (OUTPATIENT)
Dept: ORTHOPEDIC SURGERY | Age: 75
End: 2025-06-04

## 2025-06-04 VITALS
OXYGEN SATURATION: 97 % | WEIGHT: 188 LBS | BODY MASS INDEX: 27.85 KG/M2 | HEART RATE: 86 BPM | DIASTOLIC BLOOD PRESSURE: 68 MMHG | TEMPERATURE: 98.8 F | SYSTOLIC BLOOD PRESSURE: 144 MMHG | HEIGHT: 69 IN | RESPIRATION RATE: 20 BRPM

## 2025-06-04 DIAGNOSIS — Z96.612 STATUS POST REVERSE ARTHROPLASTY OF LEFT SHOULDER: Primary | ICD-10-CM

## 2025-06-04 PROCEDURE — 99024 POSTOP FOLLOW-UP VISIT: CPT | Performed by: ORTHOPAEDIC SURGERY

## 2025-06-04 NOTE — PROGRESS NOTES
[facility]  ORTHOPAEDICS AND SPORTS MEDICINE  DATE OF VISIT: 06/04/25  Follow Up Post Operative Visit     Follow Up Post Operative Visit     CHIEF COMPLAINT:   Chief Complaint   Patient presents with    Post-Op Check     Pt is here today POD# 8 for a left reverse total shoulder arthroplasty. Overall doing well.        Surgery: Left reverse total shoulder arthroplasty  Date: 5/27/2025    Subjective:    Kentrell Ybarra is here for follow up visit s/p above procedure.  He  is doing well.  He  has been compliant with postop restrictions.  Follows up today for dressing change, postoperative imaging.  Denies pain at time of visit today.    Controlled Substances Monitoring:        Physical Exam:    Height: 1.753 m (5' 9\"), Weight - Scale: 85.3 kg (188 lb), BP: (!) 144/68    General: Alert and oriented x3, no acute distress  Cardiovascular/pulmonary: No labored breathing, peripheral perfusion intact  Musculoskeletal:    Exam of the left shoulder incisions closed edges well-approximated no active drainage present.  Neurovascular sensation grossly intact.  No reported pain to the hand wrist elbow.      Imaging: X-ray including 2 views left shoulder display stable appearance of reverse total shoulder arthroplasty without complication    Assessment and Plan: Status post left reverse total shoulder arthroplasty    Patient is 1 week out from proceduralist above doing well.  Surgical dressings were removed new Steri-Strips were applied.  Patient instructed to remove in 5 to 7 days.  Can shower for basic hygiene purposes and will not submerge incision sites until mature scars are present.  Postoperative imaging was obtained and reviewed with patient today.  Reviewed the postoperative shoulder protocol and restrictions.  Will remain in shoulder arm immobilizer as directed.  Begin passive range of motion exercises.  Follow-up in 6 weeks for reevaluation.    Mauricio HARTLEY-CNP  Orthopedic Surgery   06/04/25  12:56 PM

## 2025-07-09 ENCOUNTER — OFFICE VISIT (OUTPATIENT)
Dept: ORTHOPEDIC SURGERY | Age: 75
End: 2025-07-09

## 2025-07-09 VITALS
RESPIRATION RATE: 20 BRPM | DIASTOLIC BLOOD PRESSURE: 74 MMHG | SYSTOLIC BLOOD PRESSURE: 162 MMHG | BODY MASS INDEX: 27.85 KG/M2 | HEART RATE: 76 BPM | HEIGHT: 69 IN | OXYGEN SATURATION: 96 % | TEMPERATURE: 98 F | WEIGHT: 188 LBS

## 2025-07-09 DIAGNOSIS — Z96.612 STATUS POST REVERSE ARTHROPLASTY OF LEFT SHOULDER: Primary | ICD-10-CM

## 2025-07-09 PROCEDURE — 99024 POSTOP FOLLOW-UP VISIT: CPT | Performed by: ORTHOPAEDIC SURGERY

## 2025-07-09 NOTE — PROGRESS NOTES
[facility]  ORTHOPAEDICS AND SPORTS MEDICINE  DATE OF VISIT: 07/09/25  Follow Up Post Operative Visit     Follow Up Post Operative Visit     CHIEF COMPLAINT:   Chief Complaint   Patient presents with    Post-Op Check     Patient presents today post left reverse total shoulder arthroplasty on 5/27/25. He has no pain at his appointment today.       Surgery: Left reverse total shoulder arthroplasty  Date: 5/27/2025    Subjective:    Kentrell Ybarra is here for follow up visit s/p above procedure.  He  is doing well.  He discontinued his sling several weeks ago.  Has no pain at time of visit today.    Controlled Substances Monitoring:        Physical Exam:    Height: 1.753 m (5' 9\"), Weight - Scale: 85.3 kg (188 lb), BP: (!) 162/74    General: Alert and oriented x3, no acute distress  Cardiovascular/pulmonary: No labored breathing, peripheral perfusion intact  Musculoskeletal:    Exam the left shoulder incision is healed mature scar present.  Neurovascular sensation is grossly intact.  I can passively elevate him to about 90 degrees without pain or stiffness.      Imaging: Reviewed    Assessment and Plan: Status post left reverse total shoulder arthroplasty    Patient is 6 weeks out from procedures above doing well.  Patient will now begin out patient physical therapy.  Will continue to avoid lifting, pushing, pulling with the operative extremity.  Patient will focus on stretching and range of motion.  Follow-up in 6 weeks for for reevaluation to transition to strengthening phase of rehab.    Mauricio HARTLEY-CNP  Orthopedic Surgery   07/09/25  10:53 AM

## 2025-08-27 ENCOUNTER — OFFICE VISIT (OUTPATIENT)
Dept: ORTHOPEDIC SURGERY | Age: 75
End: 2025-08-27

## 2025-08-27 VITALS
WEIGHT: 188 LBS | BODY MASS INDEX: 27.85 KG/M2 | HEART RATE: 72 BPM | DIASTOLIC BLOOD PRESSURE: 82 MMHG | HEIGHT: 69 IN | SYSTOLIC BLOOD PRESSURE: 138 MMHG | RESPIRATION RATE: 20 BRPM | OXYGEN SATURATION: 95 % | TEMPERATURE: 98.3 F

## 2025-08-27 DIAGNOSIS — Z96.612 STATUS POST REVERSE ARTHROPLASTY OF LEFT SHOULDER: Primary | ICD-10-CM

## 2025-08-27 PROCEDURE — 99024 POSTOP FOLLOW-UP VISIT: CPT | Performed by: ORTHOPAEDIC SURGERY

## (undated) DEVICE — INTENDED FOR TISSUE SEPARATION, AND OTHER PROCEDURES THAT REQUIRE A SHARP SURGICAL BLADE TO PUNCTURE OR CUT.: Brand: BARD-PARKER ® STAINLESS STEEL BLADES

## (undated) DEVICE — SPONGE GZ W4XL4IN RAYON POLY CVR W/NONWOVEN FAB STRL 2/PK

## (undated) DEVICE — Z INACTIVE USE 2635503 SOLUTION IRRIG 3000ML ST H2O USP UROMATIC PLAS CONT

## (undated) DEVICE — BLADELESS OBTURATOR: Brand: WECK VISTA

## (undated) DEVICE — DRAPE,U/ SHT,SPLIT,PLAS,STERIL: Brand: MEDLINE

## (undated) DEVICE — 4-PORT MANIFOLD: Brand: NEPTUNE 2

## (undated) DEVICE — INSUFFLATION NEEDLE TO ESTABLISH PNEUMOPERITONEUM.: Brand: INSUFFLATION NEEDLE

## (undated) DEVICE — STONE RETRIEVAL BASKET: Brand: SEGURA HEMISPHERE

## (undated) DEVICE — GUIDEWIRE URO L150CM DIA0.035IN TAPR 3CM NIT HYDRPHLC ANG

## (undated) DEVICE — GAUZE,SPONGE,4"X4",8PLY,STRL,LF,10/TRAY: Brand: MEDLINE

## (undated) DEVICE — MEDIA CONTRAST ISOVUE  300 10X50ML

## (undated) DEVICE — CONVERTORS STOCKINETTE: Brand: CONVERTORS

## (undated) DEVICE — BLOCK BITE 60FR RUBBER ADLT DENTAL

## (undated) DEVICE — PACK PROCEDURE SURG GEN CUST

## (undated) DEVICE — CHEST/BREAST DRAPE: Brand: CONVERTORS

## (undated) DEVICE — SYRINGE,TOOMEY,IRRIGATION,70CC,STERILE: Brand: MEDLINE

## (undated) DEVICE — HF-RESECTION ELECTRODE PLASMALOOP LOOP, LARGE, 24 FR., 12°/16°, ESG TURIS: Brand: OLYMPUS

## (undated) DEVICE — SYSTEM BX CAP BILI RAP EXCHG CAP LOK DEV COMPATIBLE W/ OLY

## (undated) DEVICE — GUIDEWIRE ENDO L150CM DIA0.035IN STR TIP

## (undated) DEVICE — SET SURG BASIN MAYO REUSABLE

## (undated) DEVICE — SUTURE SUTTAPE L40IN DIA1.3MM NONABSORBABLE WHT BLU L26.5MM AR7500

## (undated) DEVICE — CLIP INT M L POLYMER LOK LIG HEM O LOK

## (undated) DEVICE — CANNULA SEAL

## (undated) DEVICE — GOWN,SIRUS,FABRNF,2XL,18/CS: Brand: MEDLINE

## (undated) DEVICE — BAG DRNGE COMB PK

## (undated) DEVICE — WARMER SCP 2 ANTIFOG LAP DISP

## (undated) DEVICE — SPONGE GZ W4XL4IN RAYON POLY FILL CVR W/ NONWOVEN FAB

## (undated) DEVICE — HOSE CONN FOR WST MGMT SYS NEPTUNE 2

## (undated) DEVICE — GLOVE ORTHO 8   MSG9480

## (undated) DEVICE — ARM DRAPE

## (undated) DEVICE — Device: Brand: LEVEL 1

## (undated) DEVICE — NITINOL STONE RETRIEVAL DEVICE: Brand: DAKOTA

## (undated) DEVICE — CYSTO PACK: Brand: MEDLINE INDUSTRIES, INC.

## (undated) DEVICE — RETRIEVAL BALLOON CATHETER: Brand: EXTRACTOR™ PRO RX

## (undated) DEVICE — BLADE CLIPPER GEN PURP NS

## (undated) DEVICE — SOLUTION IRRIG 1000ML STRL H2O USP PLAS POUR BTL

## (undated) DEVICE — SUCTION IRRIGATOR: Brand: ENDOWRIST

## (undated) DEVICE — SYRINGE,PISTON,IRRIGATION,60ML,STERILE: Brand: MEDLINE

## (undated) DEVICE — ELECTRODE PT RET AD L9FT HI MOIST COND ADH HYDRGEL CORDED

## (undated) DEVICE — IMMOBILIZER SHLDR L10.5-17IN D7IN SLNG W/ 15DEG ABD PLLW

## (undated) DEVICE — TUBING, SUCTION, 3/16" X 12', STRAIGHT: Brand: MEDLINE

## (undated) DEVICE — SOLUTION SURG PREP ANTIMICROBIAL 4 OZ SKIN WND EXIDINE

## (undated) DEVICE — TOWEL,OR,DSP,ST,BLUE,STD,6/PK,12PK/CS: Brand: MEDLINE

## (undated) DEVICE — ALCOHOL RUBBING ISO 16OZ 70%

## (undated) DEVICE — Device

## (undated) DEVICE — NEEDLE CLOSURE OMNICLOSE

## (undated) DEVICE — TIP COVER ACCESSORY

## (undated) DEVICE — PACK,SHOULDER II,SIRUS: Brand: MEDLINE

## (undated) DEVICE — 2108 SERIES SAGITTAL BLADE, OFFSET (20.0 X 0.89 X 80.0MM)

## (undated) DEVICE — LENS CYSTOSCOPE 30 DEG

## (undated) DEVICE — CATHETER URETH 24FR L16IN BLLN 30CC SIL COUVELAIRE TIP 3 W

## (undated) DEVICE — CATH GUIDE

## (undated) DEVICE — SOLUTION IV IRRIG WATER 1000ML POUR BRL 2F7114

## (undated) DEVICE — SET CYSTOSCOPE 21FR

## (undated) DEVICE — LIQUIBAND RAPID ADHESIVE 36/CS 0.8ML: Brand: MEDLINE

## (undated) DEVICE — DRAIN SURG 15FR SIL RND CHN W/ TRCR FULL FLUT DBL WRP TRAD

## (undated) DEVICE — APPLICATOR PREP 26ML 0.7% IOD POVACRYLEX 74% ISO ALC ST

## (undated) DEVICE — TUBING, SUCTION, 9/32" X 10', STRAIGHT: Brand: MEDLINE

## (undated) DEVICE — PROGRASP FORCEPS: Brand: ENDOWRIST

## (undated) DEVICE — ADHESIVE SKIN CLSR 0.7ML TOP DERMBND ADV

## (undated) DEVICE — SHOULDER STABILIZATION KIT,                                    DISPOSABLE 12 PER BOX

## (undated) DEVICE — INTENT TO BE USED WITH SUTURE MATERIAL FOR TISSUE CLOSURE: Brand: RICHARD-ALLAN® NEEDLE 1/2 CIRCLE TAPER

## (undated) DEVICE — DRESSING HYDROFIBER AQUACEL AG ADVANTAGE 3.5X10 IN

## (undated) DEVICE — PAD,ABDOMINAL,5"X9",ST,LF,25/BX: Brand: MEDLINE INDUSTRIES, INC.

## (undated) DEVICE — FORCEPS BX L240CM JAW DIA2.4MM WRK CHN 2.8MM ORNG L CAP W/

## (undated) DEVICE — GRADUATE TRIANG MEASURE 1000ML BLK PRNT

## (undated) DEVICE — PIN GLENOID DYNANITE VIP 2.8MM

## (undated) DEVICE — GLOVE SURG SZ 8 CRM LTX FREE POLYISOPRENE POLYMER BEAD ANTI

## (undated) DEVICE — SOLUTION IRRIG 1000ML 0.9% SOD CHL USP POUR PLAS BTL

## (undated) DEVICE — KIT,ANTI FOG,W/SPONGE & FLUID,SOFT PACK: Brand: MEDLINE

## (undated) DEVICE — 1000 S-DRAPE TOWEL DRAPE 10/BX: Brand: STERI-DRAPE™

## (undated) DEVICE — GUIDEWIRE ENDOSCP L150CM DIA0.035IN TIP 3CM PTFE NIT

## (undated) DEVICE — STRIP,CLOSURE,WOUND,MEDI-STRIP,1/2X4: Brand: MEDLINE

## (undated) DEVICE — FORCEPS BX L240CM JAW DIA2.4MM ORNG L CAP W/ NDL DISP RAD

## (undated) DEVICE — DILATOR/SHEATH SET: Brand: 8/10 DILATOR/SHEATH SET

## (undated) DEVICE — STRAP,CATHETER,ADJBL FOAM,HOOK&LOOP: Brand: MEDLINE

## (undated) DEVICE — GOWN,SIRUS,FABRNF,XL,20/CS: Brand: MEDLINE

## (undated) DEVICE — GLOVE ORANGE PI 8   MSG9080

## (undated) DEVICE — GLOVE ORANGE PI 7 1/2   MSG9075

## (undated) DEVICE — Z INACTIVE USE 2660664 SOLUTION IRRIG 3000ML 0.9% SOD CHL USP UROMATIC PLAS CONT

## (undated) DEVICE — HIGH POWER SINGLE-USE LASER FIBER: Brand: FLEXIVA™ ID

## (undated) DEVICE — 3M™ COBAN™ NL STERILE NON-LATEX SELF-ADHERENT WRAP, 2084S, 4 IN X 5 YD (10 CM X 4,5 M), 18 ROLLS/CASE: Brand: 3M™ COBAN™

## (undated) DEVICE — INSTRUMENT CLAMP TOWEL LARGE REUSABLE

## (undated) DEVICE — GOWN,SIRUS,POLYRNF,BRTHSLV,XLN/XL,20/CS: Brand: MEDLINE

## (undated) DEVICE — PERMANENT CAUTERY HOOK: Brand: ENDOWRIST

## (undated) DEVICE — CAMERA STRYKER 1488

## (undated) DEVICE — SCISSORS SURG DIA8MM MPLR CRV ENDOWRIST

## (undated) DEVICE — INSUFFLATION TUBING SET WITH FILTER, FUNNEL CONNECTOR AND LUER LOCK: Brand: JOSNOE MEDICAL INC

## (undated) DEVICE — MEDIUM-LARGE CLIP APPLIER: Brand: ENDOWRIST

## (undated) DEVICE — APPLICATOR MEDICATED 26 CC SOLUTION HI LT ORNG CHLORAPREP

## (undated) DEVICE — SPECIMEN CUP W/LID: Brand: DEROYAL

## (undated) DEVICE — DOUBLE BASIN SET: Brand: MEDLINE INDUSTRIES, INC.

## (undated) DEVICE — SYRINGE IRRIG 60ML SFT PLIABLE BLB EZ TO GRP 1 HND USE W/

## (undated) DEVICE — BIT DRILL 3.0

## (undated) DEVICE — SPONGE LAP W18XL18IN WHT COT 4 PLY FLD STRUNG RADPQ DISP ST 2 PER PACK

## (undated) DEVICE — CATHETER URET 5FR L70CM OPN END SGL LUMN INJ HUB FLEXIMA

## (undated) DEVICE — COLUMN DRAPE

## (undated) DEVICE — WAX SURG 2.5GM HEMSTAT BNE BEESWAX PARAFFIN ISO PALMITATE

## (undated) DEVICE — WIPES SKIN CLOTH READYPREP 9 X 10.5 IN 2% CHLORHEX GLUCONATE CHG PREOP

## (undated) DEVICE — DRAINBAG,ANTI-REFLUX TOWER,L/F,2000ML,LL: Brand: MEDLINE

## (undated) DEVICE — 3M™ STERI-DRAPE™ U-DRAPE 1015: Brand: STERI-DRAPE™

## (undated) DEVICE — COVER,BOOT,FOAM,NON-SKID,HOOK-LOOP,XLG: Brand: MEDLINE INDUSTRIES, INC.

## (undated) DEVICE — SPHINCTEROTOME: Brand: DREAMTOME™ RX 44

## (undated) DEVICE — T-MAX DISPOSABLE FACE MASK 8 PER BOX

## (undated) DEVICE — DRAPE,REIN 53X77,STERILE: Brand: MEDLINE

## (undated) DEVICE — BLADE,STAINLESS-STEEL,10,STRL,DISPOSABLE: Brand: MEDLINE

## (undated) DEVICE — REAMER ANGLED SMALL

## (undated) DEVICE — 3M™ IOBAN™ 2 ANTIMICROBIAL INCISE DRAPE 6650EZ: Brand: IOBAN™ 2